# Patient Record
Sex: FEMALE | Race: WHITE | NOT HISPANIC OR LATINO | Employment: UNEMPLOYED | URBAN - NONMETROPOLITAN AREA
[De-identification: names, ages, dates, MRNs, and addresses within clinical notes are randomized per-mention and may not be internally consistent; named-entity substitution may affect disease eponyms.]

---

## 2018-02-23 ENCOUNTER — HISTORICAL (OUTPATIENT)
Dept: ADMINISTRATIVE | Facility: HOSPITAL | Age: 58
End: 2018-02-23

## 2018-03-29 LAB — CRC RECOMMENDATION EXT: NORMAL

## 2018-12-24 ENCOUNTER — HISTORICAL (OUTPATIENT)
Dept: ADMINISTRATIVE | Facility: HOSPITAL | Age: 58
End: 2018-12-24

## 2019-01-30 ENCOUNTER — HISTORICAL (OUTPATIENT)
Dept: ADMINISTRATIVE | Facility: HOSPITAL | Age: 59
End: 2019-01-30

## 2019-02-24 ENCOUNTER — HISTORICAL (OUTPATIENT)
Dept: ADMINISTRATIVE | Facility: HOSPITAL | Age: 59
End: 2019-02-24

## 2020-05-19 ENCOUNTER — HISTORICAL (OUTPATIENT)
Dept: ADMINISTRATIVE | Facility: HOSPITAL | Age: 60
End: 2020-05-19

## 2020-07-11 ENCOUNTER — HISTORICAL (OUTPATIENT)
Dept: ADMINISTRATIVE | Facility: HOSPITAL | Age: 60
End: 2020-07-11

## 2021-05-24 LAB
BILIRUB SERPL-MCNC: NEGATIVE MG/DL
BLOOD URINE, POC: NEGATIVE
CLARITY, POC UA: CLEAR
COLOR, POC UA: YELLOW
GLUCOSE UR QL STRIP: NORMAL
KETONES UR QL STRIP: NEGATIVE
LEUKOCYTE EST, POC UA: NORMAL
NITRITE, POC UA: NEGATIVE
PH, POC UA: 5.5
PROTEIN, POC: NEGATIVE
SPECIFIC GRAVITY, POC UA: 1.02
UROBILINOGEN, POC UA: NORMAL

## 2021-05-27 ENCOUNTER — HISTORICAL (OUTPATIENT)
Dept: ADMINISTRATIVE | Facility: HOSPITAL | Age: 61
End: 2021-05-27

## 2021-12-19 ENCOUNTER — HISTORICAL (OUTPATIENT)
Dept: ADMINISTRATIVE | Facility: HOSPITAL | Age: 61
End: 2021-12-19

## 2022-07-29 ENCOUNTER — HISTORICAL (OUTPATIENT)
Dept: ADMINISTRATIVE | Facility: HOSPITAL | Age: 62
End: 2022-07-29

## 2022-07-29 LAB — BCS RECOMMENDATION EXT: NORMAL

## 2022-09-17 ENCOUNTER — HISTORICAL (OUTPATIENT)
Dept: ADMINISTRATIVE | Facility: HOSPITAL | Age: 62
End: 2022-09-17

## 2022-10-08 ENCOUNTER — HISTORICAL (OUTPATIENT)
Dept: ADMINISTRATIVE | Facility: HOSPITAL | Age: 62
End: 2022-10-08

## 2022-12-01 ENCOUNTER — HISTORICAL (OUTPATIENT)
Dept: ADMINISTRATIVE | Facility: HOSPITAL | Age: 62
End: 2022-12-01

## 2022-12-15 ENCOUNTER — HISTORICAL (OUTPATIENT)
Dept: ADMINISTRATIVE | Facility: HOSPITAL | Age: 62
End: 2022-12-15

## 2023-01-04 ENCOUNTER — HISTORICAL (OUTPATIENT)
Dept: ADMINISTRATIVE | Facility: HOSPITAL | Age: 63
End: 2023-01-04
Payer: MEDICAID

## 2023-01-06 ENCOUNTER — HOSPITAL ENCOUNTER (EMERGENCY)
Facility: HOSPITAL | Age: 63
Discharge: HOME | End: 2023-01-06
Attending: STUDENT IN AN ORGANIZED HEALTH CARE EDUCATION/TRAINING PROGRAM
Payer: MEDICAID

## 2023-01-06 VITALS
SYSTOLIC BLOOD PRESSURE: 93 MMHG | HEART RATE: 61 BPM | WEIGHT: 180 LBS | DIASTOLIC BLOOD PRESSURE: 53 MMHG | RESPIRATION RATE: 16 BRPM | OXYGEN SATURATION: 96 % | TEMPERATURE: 98 F

## 2023-01-06 DIAGNOSIS — S52.502S CLOSED FRACTURE OF DISTAL END OF LEFT RADIUS, UNSPECIFIED FRACTURE MORPHOLOGY, SEQUELA: ICD-10-CM

## 2023-01-06 DIAGNOSIS — S52.92XS CLOSED FRACTURE OF LEFT RADIUS AND ULNA, SEQUELA: Primary | ICD-10-CM

## 2023-01-06 DIAGNOSIS — S52.202S CLOSED FRACTURE OF LEFT RADIUS AND ULNA, SEQUELA: Primary | ICD-10-CM

## 2023-01-06 PROCEDURE — 99284 EMERGENCY DEPT VISIT MOD MDM: CPT

## 2023-01-06 RX ORDER — HYDROCODONE BITARTRATE AND ACETAMINOPHEN 5; 325 MG/1; MG/1
1 TABLET ORAL EVERY 6 HOURS PRN
Qty: 12 TABLET | Refills: 0 | Status: SHIPPED | OUTPATIENT
Start: 2023-01-06 | End: 2023-01-23

## 2023-01-06 NOTE — ED PROVIDER NOTES
Encounter Date: 1/6/2023       History     Chief Complaint   Patient presents with    Arm Injury     Seen at [facility] after fall with left arm fracture, told to come here for ortho referral.     Adilia Gregg is a 63 y.o. female who presents to the ED requesting an ortho referral. Patient reports falling onto her left arm on Wednesday and then presenting to the ER in [place]. She was told she has a left wrist fracture that would likely need surgery. She was placed in a splint and is here today requesting a referral to orthopedics. She reports pain at this time controlled with norco, however she is going to run out of norco today. She denies numbness/tingling, new trauma or injury, new or worsening pain.     The history is provided by the patient. No  was used.   Review of patient's allergies indicates:  No Known Allergies  No past medical history on file.  No past surgical history on file.  No family history on file.     Review of Systems   Constitutional:  Negative for chills and fever.   HENT:  Negative for congestion and sore throat.    Eyes:  Negative for redness and itching.   Respiratory:  Negative for cough and shortness of breath.    Cardiovascular:  Negative for chest pain.   Gastrointestinal:  Negative for abdominal pain and nausea.   Genitourinary:  Negative for dysuria and frequency.   Musculoskeletal:  Positive for arthralgias. Negative for back pain and gait problem.   Skin:  Negative for rash and wound.   Neurological:  Negative for dizziness and weakness.   Hematological:  Does not bruise/bleed easily.   Psychiatric/Behavioral:  Negative for agitation and confusion.      Physical Exam     Initial Vitals   BP Pulse Resp Temp SpO2   01/06/23 1112 01/06/23 1111 01/06/23 1111 01/06/23 1111 01/06/23 1111   (S) 99/65 67 18 98.2 °F (36.8 °C) 100 %      MAP       --                Physical Exam    Nursing note and vitals reviewed.  Constitutional: She appears well-developed and  well-nourished. No distress.   HENT:   Head: Normocephalic and atraumatic.   Mouth/Throat: No oropharyngeal exudate.   Eyes: EOM are normal. No scleral icterus.   Neck: Neck supple.   Normal range of motion.  Cardiovascular:  Normal rate and regular rhythm.           No murmur heard.  Pulmonary/Chest: Breath sounds normal. No respiratory distress. She has no wheezes.   Abdominal: Abdomen is soft. Bowel sounds are normal. She exhibits no distension. There is no abdominal tenderness.   Musculoskeletal:         General: Normal range of motion.      Cervical back: Normal range of motion and neck supple.      Comments: Left arm in sling and splint. NVI. Anh capillary refill     Neurological: She is alert and oriented to person, place, and time. No cranial nerve deficit.   Skin: Skin is warm and dry. Capillary refill takes less than 2 seconds. No erythema.   Psychiatric: She has a normal mood and affect. Her behavior is normal. Judgment and thought content normal.       ED Course   Procedures  Labs Reviewed - No data to display       Imaging Results    None          Medications - No data to display        APC / Resident Notes:   I was not physically present during the history, exam or disposition of this patient. I was available at all times for consultation. (Zmora)                   Clinical Impression:   Final diagnoses:  [S52.502S] Closed fracture of distal end of left radius, unspecified fracture morphology, sequela  [S52.92XS, S52.202S] Closed fracture of left radius and ulna, sequela (Primary)        ED Disposition Condition    Discharge Stable          ED Prescriptions       Medication Sig Dispense Start Date End Date Auth. Provider    HYDROcodone-acetaminophen (NORCO) 5-325 mg per tablet Take 1 tablet by mouth every 6 (six) hours as needed for Pain. 12 tablet 1/6/2023 -- Saranya Velarde PA-C          Follow-up Information       Follow up With Specialties Details Why Contact Info    [facility]  Orthoped[facility] Orthopedics Schedule an appointment as soon as possible for a visit   [address] [facility] [address] [phone]    [facility] - Emergency Dept Emergency Medicine  If symptoms worsen [address] [address] [phone]             Saranya Velarde PA-C  01/06/23 1224       Merlin Emmanuel MD  01/06/23 1530

## 2023-01-19 ENCOUNTER — DOCUMENTATION ONLY (OUTPATIENT)
Dept: ADMINISTRATIVE | Facility: HOSPITAL | Age: 63
End: 2023-01-19
Payer: MEDICAID

## 2023-01-20 ENCOUNTER — DOCUMENTATION ONLY (OUTPATIENT)
Dept: ADMINISTRATIVE | Facility: HOSPITAL | Age: 63
End: 2023-01-20
Payer: MEDICAID

## 2023-01-23 ENCOUNTER — HOSPITAL ENCOUNTER (OUTPATIENT)
Dept: CARDIOLOGY | Facility: HOSPITAL | Age: 63
Discharge: HOME | End: 2023-01-23
Attending: ORTHOPAEDIC SURGERY
Payer: MEDICAID

## 2023-01-23 ENCOUNTER — OFFICE VISIT (OUTPATIENT)
Dept: ORTHOPEDICS | Facility: CLINIC | Age: 63
End: 2023-01-23
Payer: MEDICAID

## 2023-01-23 ENCOUNTER — ANESTHESIA EVENT (OUTPATIENT)
Dept: SURGERY | Facility: HOSPITAL | Age: 63
End: 2023-01-23
Payer: MEDICAID

## 2023-01-23 ENCOUNTER — HOSPITAL ENCOUNTER (OUTPATIENT)
Dept: RADIOLOGY | Facility: HOSPITAL | Age: 63
Discharge: HOME | End: 2023-01-23
Attending: STUDENT IN AN ORGANIZED HEALTH CARE EDUCATION/TRAINING PROGRAM
Payer: MEDICAID

## 2023-01-23 ENCOUNTER — HOSPITAL ENCOUNTER (OUTPATIENT)
Dept: RADIOLOGY | Facility: HOSPITAL | Age: 63
Discharge: HOME | End: 2023-01-23
Attending: ORTHOPAEDIC SURGERY
Payer: MEDICAID

## 2023-01-23 VITALS
WEIGHT: 178.38 LBS | OXYGEN SATURATION: 99 % | HEIGHT: 60 IN | HEART RATE: 61 BPM | SYSTOLIC BLOOD PRESSURE: 100 MMHG | DIASTOLIC BLOOD PRESSURE: 66 MMHG | BODY MASS INDEX: 35.02 KG/M2 | RESPIRATION RATE: 18 BRPM

## 2023-01-23 DIAGNOSIS — S52.92XS CLOSED FRACTURE OF LEFT RADIUS AND ULNA, SEQUELA: ICD-10-CM

## 2023-01-23 DIAGNOSIS — Z01.818 OTHER SPECIFIED PRE-OPERATIVE EXAMINATION: ICD-10-CM

## 2023-01-23 DIAGNOSIS — S52.202S CLOSED FRACTURE OF LEFT RADIUS AND ULNA, SEQUELA: ICD-10-CM

## 2023-01-23 DIAGNOSIS — M25.532 LEFT WRIST PAIN: ICD-10-CM

## 2023-01-23 DIAGNOSIS — M25.532 LEFT WRIST PAIN: Primary | ICD-10-CM

## 2023-01-23 PROBLEM — S52.202A CLOSED FRACTURE OF LEFT RADIUS AND ULNA: Status: ACTIVE | Noted: 2023-01-23

## 2023-01-23 PROBLEM — S52.92XA CLOSED FRACTURE OF LEFT RADIUS AND ULNA: Status: ACTIVE | Noted: 2023-01-23

## 2023-01-23 PROCEDURE — 3074F SYST BP LT 130 MM HG: CPT | Mod: CPTII,,, | Performed by: ORTHOPAEDIC SURGERY

## 2023-01-23 PROCEDURE — 93005 ELECTROCARDIOGRAM TRACING: CPT

## 2023-01-23 PROCEDURE — 3008F BODY MASS INDEX DOCD: CPT | Mod: CPTII,,, | Performed by: ORTHOPAEDIC SURGERY

## 2023-01-23 PROCEDURE — 1159F MED LIST DOCD IN RCRD: CPT | Mod: CPTII,,, | Performed by: ORTHOPAEDIC SURGERY

## 2023-01-23 PROCEDURE — 99204 OFFICE O/P NEW MOD 45 MIN: CPT | Mod: S$PBB,57,, | Performed by: ORTHOPAEDIC SURGERY

## 2023-01-23 PROCEDURE — 71046 X-RAY EXAM CHEST 2 VIEWS: CPT | Mod: TC

## 2023-01-23 PROCEDURE — 3008F PR BODY MASS INDEX (BMI) DOCUMENTED: ICD-10-PCS | Mod: CPTII,,, | Performed by: ORTHOPAEDIC SURGERY

## 2023-01-23 PROCEDURE — 99204 PR OFFICE/OUTPT VISIT, NEW, LEVL IV, 45-59 MIN: ICD-10-PCS | Mod: S$PBB,57,, | Performed by: ORTHOPAEDIC SURGERY

## 2023-01-23 PROCEDURE — 3078F PR MOST RECENT DIASTOLIC BLOOD PRESSURE < 80 MM HG: ICD-10-PCS | Mod: CPTII,,, | Performed by: ORTHOPAEDIC SURGERY

## 2023-01-23 PROCEDURE — 3074F PR MOST RECENT SYSTOLIC BLOOD PRESSURE < 130 MM HG: ICD-10-PCS | Mod: CPTII,,, | Performed by: ORTHOPAEDIC SURGERY

## 2023-01-23 PROCEDURE — 1159F PR MEDICATION LIST DOCUMENTED IN MEDICAL RECORD: ICD-10-PCS | Mod: CPTII,,, | Performed by: ORTHOPAEDIC SURGERY

## 2023-01-23 PROCEDURE — 99215 OFFICE O/P EST HI 40 MIN: CPT | Mod: PBBFAC,25

## 2023-01-23 PROCEDURE — 3078F DIAST BP <80 MM HG: CPT | Mod: CPTII,,, | Performed by: ORTHOPAEDIC SURGERY

## 2023-01-23 PROCEDURE — 73110 X-RAY EXAM OF WRIST: CPT | Mod: TC,LT

## 2023-01-23 RX ORDER — METOPROLOL TARTRATE 50 MG/1
50 TABLET ORAL 2 TIMES DAILY
COMMUNITY
Start: 2022-12-19

## 2023-01-23 RX ORDER — MUPIROCIN 20 MG/G
OINTMENT TOPICAL
Status: CANCELLED | OUTPATIENT
Start: 2023-01-23

## 2023-01-23 RX ORDER — LORATADINE 10 MG/1
10 TABLET ORAL
COMMUNITY
Start: 2022-12-19

## 2023-01-23 RX ORDER — BUSPIRONE HYDROCHLORIDE 10 MG/1
10 TABLET ORAL EVERY 8 HOURS PRN
COMMUNITY
Start: 2022-12-19 | End: 2023-09-01

## 2023-01-23 RX ORDER — CALCIUM CITRATE/VITAMIN D3 200MG-6.25
TABLET ORAL
COMMUNITY
Start: 2022-08-22

## 2023-01-23 RX ORDER — ALBUTEROL SULFATE 90 UG/1
AEROSOL, METERED RESPIRATORY (INHALATION)
COMMUNITY
Start: 2022-12-19

## 2023-01-23 RX ORDER — GABAPENTIN 300 MG/1
300 CAPSULE ORAL 2 TIMES DAILY
COMMUNITY
Start: 2022-12-19

## 2023-01-23 RX ORDER — METFORMIN HYDROCHLORIDE 1000 MG/1
1000 TABLET ORAL NIGHTLY
COMMUNITY
Start: 2022-12-19

## 2023-01-23 RX ORDER — SITAGLIPTIN 100 MG/1
TABLET, FILM COATED ORAL
COMMUNITY
Start: 2022-12-19

## 2023-01-23 RX ORDER — ISOSORBIDE MONONITRATE 30 MG/1
30 TABLET, EXTENDED RELEASE ORAL DAILY
COMMUNITY
Start: 2022-12-30

## 2023-01-23 RX ORDER — DICLOFENAC SODIUM 10 MG/G
1 GEL TOPICAL
COMMUNITY
Start: 2022-07-13

## 2023-01-23 RX ORDER — FENOFIBRATE 145 MG/1
145 TABLET, FILM COATED ORAL
COMMUNITY
Start: 2022-12-19

## 2023-01-23 RX ORDER — ROSUVASTATIN CALCIUM 10 MG/1
40 TABLET, COATED ORAL DAILY
COMMUNITY
Start: 2022-12-19

## 2023-01-23 RX ORDER — OLANZAPINE 20 MG/1
20 TABLET ORAL NIGHTLY
COMMUNITY
Start: 2022-10-10

## 2023-01-23 RX ORDER — CITALOPRAM 20 MG/1
20 TABLET, FILM COATED ORAL
COMMUNITY
Start: 2022-12-19

## 2023-01-23 RX ORDER — NITROGLYCERIN 0.4 MG/1
0.4 TABLET SUBLINGUAL
COMMUNITY
Start: 2022-07-13

## 2023-01-23 RX ORDER — HYDROCODONE BITARTRATE AND ACETAMINOPHEN 5; 325 MG/1; MG/1
1 TABLET ORAL EVERY 6 HOURS PRN
Qty: 10 TABLET | Refills: 0 | Status: SHIPPED | OUTPATIENT
Start: 2023-01-23 | End: 2023-01-26

## 2023-01-23 RX ORDER — BUDESONIDE AND FORMOTEROL FUMARATE DIHYDRATE 80; 4.5 UG/1; UG/1
1 AEROSOL RESPIRATORY (INHALATION) 2 TIMES DAILY
COMMUNITY
Start: 2022-12-19

## 2023-01-23 RX ORDER — DICLOFENAC SODIUM 50 MG/1
50 TABLET, DELAYED RELEASE ORAL 2 TIMES DAILY
COMMUNITY
Start: 2022-07-13

## 2023-01-23 RX ORDER — SPIRONOLACTONE 25 MG/1
25 TABLET ORAL EVERY MORNING
COMMUNITY
Start: 2022-12-19

## 2023-01-23 RX ORDER — LISINOPRIL AND HYDROCHLOROTHIAZIDE 12.5; 2 MG/1; MG/1
1 TABLET ORAL DAILY
COMMUNITY
Start: 2022-12-19

## 2023-01-23 RX ORDER — CANAGLIFLOZIN 100 MG/1
TABLET, FILM COATED ORAL
COMMUNITY
Start: 2022-12-19

## 2023-01-23 RX ORDER — FUROSEMIDE 20 MG/1
20 TABLET ORAL DAILY
COMMUNITY
Start: 2022-12-19

## 2023-01-23 RX ORDER — PIOGLITAZONEHYDROCHLORIDE 15 MG/1
15 TABLET ORAL EVERY MORNING
COMMUNITY
Start: 2022-12-19

## 2023-01-23 NOTE — ANESTHESIA PREPROCEDURE EVALUATION
"                                                                                                             01/23/2023  Adilia Gregg is a 63 y.o., female with PMHx of obesity, PAD, HTN, HLD, ARVIN, smoking, COPD, CKD, DM, h/o TIA, bipolar/anxiety/depression presents for ORIF Lt distal radius.    COVID STATUS: NOT VACCINATED  BETA-BLOCKER: METOPROLOL    SCAR NURSE PHONE INTERVIEW 1/24/23    PROBLEM LIST:  -  LEFT DISTAL RADIUS/ULNA FRACTURE 2/2 FALL 1/4/23  -  OBESITY CLASS I  -  Per CARDs: CP 2/2 CORONARY MICROVASCULAR DYSFUNCTION - NTG USE (LASTLY 4mos. AGO x 1 TAB)  -  PAD (NON-OBST.) w/NEUROGENIC CLAUDICATION  -  Hx 2009 TIA - on ASA 81mg  -  HTN   -  HLD  -  T2DM (ORALS X 4) - 1/23/23 A1c 6.1% /; AVG. FASTING GLUCOSE "145"; DENIES ANY HYPOGLYCEMIA      - DM PERIPHERAL NEUROPATHY  -  BIPOLAR DISORDER, ANXIETY/DEPRESSION  -  CKD - 1/23/23 GFR=47, CREAT=1.29  -  CHRONIC VENOUS INSUFFICIENCY  -  OA  -  ARVIN - NO Tx  -  SMOKER 47 PPY; COPD - QUICK RELIEF USE (LASTLY 2+mos. AGO)    AM Rx DOS: ALBUTEROL INHAL PRN, BUSPAR, CELEXA, GABAPENTIN, NORCO PRN, ISOSORBIDE, METOPROLOL, SYMBICORT (CARDs ORDERED RANEXA BUT NOT IMPROVED by INSURANCE)    ORDERS -   SURGEON: 1/23/23 CBC, CMP, EKG, CXR;  ANESTHESIA: 1/23/23 A1c; DM PROTOCOL  CARDS ([facility]) - DR. REYES 9/29/22 OV (INCL. 11/22/21 ANNA MARIE); 10/27/22 ECHO - EF 60-65%, RVSP 32mmHg; 6/27/22 EKG; 12/23/21 LHC, ABD. AORTOGRAM (IN MEDIA)  Pre-op Assessment    I have reviewed the Patient Summary Reports.     I have reviewed the Nursing Notes. I have reviewed the NPO Status.   I have reviewed the Medications.     Review of Systems  Anesthesia Hx:  No problems with previous Anesthesia  History of prior surgery of interest to airway management or planning: Denies Family Hx of Anesthesia complications.   Denies Personal Hx of Anesthesia complications.   Hematology/Oncology:  Hematology Normal   Oncology Normal     EENT/Dental:  EENT/Dental Normal  Denies Otitis Media "   Cardiovascular:   Hypertension    Pulmonary:   COPD    Renal/:   Chronic Renal Disease    Hepatic/GI:  Hepatic/GI Normal    Musculoskeletal:   Arthritis     Neurological:   Neuromuscular Disease,    Endocrine:   Diabetes    Dermatological:  Skin Normal    Psych:   Psychiatric History          Physical Exam  General: Alert      Lab Results   Component Value Date    WBC 13.1 (H) 01/23/2023    HGB 13.6 01/23/2023    HCT 41.4 01/23/2023    MCV 88.1 01/23/2023     01/23/2023       CMP  Sodium Level   Date Value Ref Range Status   01/23/2023 135 (L) 136 - 145 mmol/L Final     Potassium Level   Date Value Ref Range Status   01/23/2023 3.8 3.5 - 5.1 mmol/L Final     Carbon Dioxide   Date Value Ref Range Status   01/23/2023 26 23 - 31 mmol/L Final     Blood Urea Nitrogen   Date Value Ref Range Status   01/23/2023 19.4 9.8 - 20.1 mg/dL Final     Creatinine   Date Value Ref Range Status   01/23/2023 1.29 (H) 0.55 - 1.02 mg/dL Final     Calcium Level Total   Date Value Ref Range Status   01/23/2023 9.8 8.4 - 10.2 mg/dL Final     Albumin Level   Date Value Ref Range Status   01/23/2023 4.0 3.4 - 4.8 g/dL Final     Bilirubin Total   Date Value Ref Range Status   01/23/2023 0.2 <=1.5 mg/dL Final     Alkaline Phosphatase   Date Value Ref Range Status   01/23/2023 77 40 - 150 unit/L Final     Aspartate Aminotransferase   Date Value Ref Range Status   01/23/2023 14 5 - 34 unit/L Final     Alanine Aminotransferase   Date Value Ref Range Status   01/23/2023 16 0 - 55 unit/L Final     eGFR   Date Value Ref Range Status   01/23/2023 47 mls/min/1.73/m2 Final     Lab Results   Component Value Date    HGBA1C 6.1 01/23/2023 12/23/21 LHC, ABD. AORTOGRAM    11/22/21 ANNA MARIE    10/27/22 ECHO          Anesthesia Plan  Type of Anesthesia, risks & benefits discussed:    Anesthesia Type: MAC, Regional  Intra-op Monitoring Plan: Standard ASA Monitors  Post Op Pain Control Plan: multimodal analgesia, peripheral nerve block and IV/PO  Opioids PRN  Induction:  IV  Airway Plan: Direct  Informed Consent: Informed consent signed with the Patient and all parties understand the risks and agree with anesthesia plan.  All questions answered. Patient consented to blood products? No  ASA Score: 3  Day of Surgery Review of History & Physical: H&P Update referred to the surgeon/provider.    Ready For Surgery From Anesthesia Perspective.     .

## 2023-01-23 NOTE — PROGRESS NOTES
[facility] Orthopaedic Surgery Clinic Note    In brief, Adilia Gregg is a 63 y.o. female with a closed left distal radius fracture, she previously underwent closed reduction and splinting.  However since this time her fracture has moved and she will require surgery.  She denies previous injuries to this arm.  She states that she is right-hand dominant, and lives alone.  She denies numbness.    She states that she smokes a pack a day of cigarettes, she denies illicit drug use or heavy drinking.      PMH:   Past Medical History:   Diagnosis Date    Diabetes mellitus, type 2     Hypertension     Personal history of colonic polyps     Joe Barnes MD       PSH:   Past Surgical History:   Procedure Laterality Date    COLONOSCOPY      Joe Barnes MD       SH:   Social History     Socioeconomic History    Marital status: Single   Tobacco Use    Smoking status: Every Day     Packs/day: 1.00     Types: Cigarettes    Smokeless tobacco: Never   Substance and Sexual Activity    Alcohol use: Not Currently    Drug use: Not Currently       FH: History reviewed. No pertinent family history.    Allergies: Review of patient's allergies indicates:  No Known Allergies    ROS:  See HPI    Physical Exam:  Vitals:    01/23/23 1104   BP: 100/66   Pulse: 61   Resp: 18       General: NAD  Cardio: RRR by peripheral pulse  Pulm: Normal WOB on room air, symmetric chest rise      MSK:  Left upper extremity   2 small eschars over the dorsum of her hand with significant ecchymosis surrounding the wrist with a volar deformity  No open wounds volarly   Moderate edema   Tenderness to palpation over the wrist  Sensation intact to light touch over median/ulnar/radial nerve distributions   AIN/PIN/ulnar nerve motor intact grossly   Radial pulse palpable    Imaging:   X-rays of the left wrist demonstrate a significantly displaced, comminuted distal radius fracture with volar angulation and malrotation, also visualized as a distal ulna  fracture    Assessment:  63-year-old female with a mechanical fall from standing with subsequent left distal radius fracture, previously undergone closed reduction and splinting.  Her distal radius has subsequently displaced.      Risks, benefits, alternatives to open reduction internal fixation left distal radius were discussed with the patient today in clinic, she understands these and elects to proceed with surgery   We will coordinate preoperative testing with anesthesia, she has a history of asthma, COPD, diabetes  Inform the patient she should speak with her insurance company in order to arrange transportation to and from surgery since she lives in [place], by herself  Plan for outpatient surgery, open reduction internal fixation of left distal radius on 01/26/2023      Issa Barahona MD  [facility] Orthopaedic Surgery

## 2023-01-23 NOTE — H&P (VIEW-ONLY)
[facility] Orthopaedic Surgery Clinic Note    In brief, Adilia Gregg is a 63 y.o. female with a closed left distal radius fracture, she previously underwent closed reduction and splinting.  However since this time her fracture has moved and she will require surgery.  She denies previous injuries to this arm.  She states that she is right-hand dominant, and lives alone.  She denies numbness.    She states that she smokes a pack a day of cigarettes, she denies illicit drug use or heavy drinking.      PMH:   Past Medical History:   Diagnosis Date    Diabetes mellitus, type 2     Hypertension     Personal history of colonic polyps     Joe Barnes MD       PSH:   Past Surgical History:   Procedure Laterality Date    COLONOSCOPY      Joe Barnes MD       SH:   Social History     Socioeconomic History    Marital status: Single   Tobacco Use    Smoking status: Every Day     Packs/day: 1.00     Types: Cigarettes    Smokeless tobacco: Never   Substance and Sexual Activity    Alcohol use: Not Currently    Drug use: Not Currently       FH: History reviewed. No pertinent family history.    Allergies: Review of patient's allergies indicates:  No Known Allergies    ROS:  See HPI    Physical Exam:  Vitals:    01/23/23 1104   BP: 100/66   Pulse: 61   Resp: 18       General: NAD  Cardio: RRR by peripheral pulse  Pulm: Normal WOB on room air, symmetric chest rise      MSK:  Left upper extremity   2 small eschars over the dorsum of her hand with significant ecchymosis surrounding the wrist with a volar deformity  No open wounds volarly   Moderate edema   Tenderness to palpation over the wrist  Sensation intact to light touch over median/ulnar/radial nerve distributions   AIN/PIN/ulnar nerve motor intact grossly   Radial pulse palpable    Imaging:   X-rays of the left wrist demonstrate a significantly displaced, comminuted distal radius fracture with volar angulation and malrotation, also visualized as a distal ulna  fracture    Assessment:  63-year-old female with a mechanical fall from standing with subsequent left distal radius fracture, previously undergone closed reduction and splinting.  Her distal radius has subsequently displaced.      Risks, benefits, alternatives to open reduction internal fixation left distal radius were discussed with the patient today in clinic, she understands these and elects to proceed with surgery   We will coordinate preoperative testing with anesthesia, she has a history of asthma, COPD, diabetes  Inform the patient she should speak with her insurance company in order to arrange transportation to and from surgery since she lives in [place], by herself  Plan for outpatient surgery, open reduction internal fixation of left distal radius on 01/26/2023      Issa Barahona MD  \[facility]\"" Orthopaedic Surgery

## 2023-01-23 NOTE — PROGRESS NOTES
Faculty Attestation: Adilia Gregg  was seen at [facility] in the Orthopaedic Clinic. Patient seen and evaluated at the time of the visit. History of Present Illness, Physical Exam, and Assessment and Plan reviewed. Treatment plan is reasonable and appropriate. Compliance with treatment recommendations is important. No procedure was performed.     Patient with left distal radius fracture.  Plan for ORIF of distal radius.  Risks/benefits/alternatives discussed in detail.  All questions answered, no guarantees made.  Plan for surgery on Thursday.    Manny Mariscal MD  Orthopaedic Surgery

## 2023-01-24 RX ORDER — RANOLAZINE 500 MG/1
500 TABLET, EXTENDED RELEASE ORAL 2 TIMES DAILY
COMMUNITY

## 2023-01-26 ENCOUNTER — ANESTHESIA (OUTPATIENT)
Dept: SURGERY | Facility: HOSPITAL | Age: 63
End: 2023-01-26
Payer: MEDICAID

## 2023-01-26 ENCOUNTER — HOSPITAL ENCOUNTER (OUTPATIENT)
Facility: HOSPITAL | Age: 63
Discharge: HOME | End: 2023-01-26
Attending: ORTHOPAEDIC SURGERY | Admitting: ORTHOPAEDIC SURGERY
Payer: MEDICAID

## 2023-01-26 DIAGNOSIS — Z01.818 OTHER SPECIFIED PRE-OPERATIVE EXAMINATION: Primary | ICD-10-CM

## 2023-01-26 DIAGNOSIS — S52.202S CLOSED FRACTURE OF LEFT RADIUS AND ULNA, SEQUELA: ICD-10-CM

## 2023-01-26 DIAGNOSIS — S52.92XS CLOSED FRACTURE OF LEFT RADIUS AND ULNA, SEQUELA: ICD-10-CM

## 2023-01-26 DIAGNOSIS — M25.532 LEFT WRIST PAIN: ICD-10-CM

## 2023-01-26 LAB
GLUCOSE SERPL-MCNC: 118 MG/DL (ref 70–110)
POCT GLUCOSE: 105 MG/DL (ref 70–110)
POCT GLUCOSE: 118 MG/DL (ref 70–110)
POCT GLUCOSE: 127 MG/DL (ref 70–110)

## 2023-01-26 PROCEDURE — 25607 PR OPEN RX DISTAL RADIUS FX, EXTRA-ARTICULAR: ICD-10-PCS | Mod: LT,,, | Performed by: ORTHOPAEDIC SURGERY

## 2023-01-26 PROCEDURE — 01830 ANES ARTHR/NDSC WRST/HND NOS: CPT | Performed by: ORTHOPAEDIC SURGERY

## 2023-01-26 PROCEDURE — 63600175 PHARM REV CODE 636 W HCPCS

## 2023-01-26 PROCEDURE — C1769 GUIDE WIRE: HCPCS | Performed by: ORTHOPAEDIC SURGERY

## 2023-01-26 PROCEDURE — 37000009 HC ANESTHESIA EA ADD 15 MINS: Performed by: ORTHOPAEDIC SURGERY

## 2023-01-26 PROCEDURE — 71000016 HC POSTOP RECOV ADDL HR: Performed by: ORTHOPAEDIC SURGERY

## 2023-01-26 PROCEDURE — 63600175 PHARM REV CODE 636 W HCPCS: Performed by: ANESTHESIOLOGY

## 2023-01-26 PROCEDURE — 25000003 PHARM REV CODE 250: Performed by: SPECIALIST

## 2023-01-26 PROCEDURE — C1713 ANCHOR/SCREW BN/BN,TIS/BN: HCPCS | Performed by: ORTHOPAEDIC SURGERY

## 2023-01-26 PROCEDURE — 25000003 PHARM REV CODE 250: Performed by: NURSE ANESTHETIST, CERTIFIED REGISTERED

## 2023-01-26 PROCEDURE — 63600175 PHARM REV CODE 636 W HCPCS: Performed by: SPECIALIST

## 2023-01-26 PROCEDURE — 76942 ECHO GUIDE FOR BIOPSY: CPT | Performed by: ANESTHESIOLOGY

## 2023-01-26 PROCEDURE — 63600175 PHARM REV CODE 636 W HCPCS: Performed by: NURSE ANESTHETIST, CERTIFIED REGISTERED

## 2023-01-26 PROCEDURE — 36000710: Performed by: ORTHOPAEDIC SURGERY

## 2023-01-26 PROCEDURE — 25607 OPTX DST RD XARTC FX/EPI SEP: CPT | Mod: LT,,, | Performed by: ORTHOPAEDIC SURGERY

## 2023-01-26 PROCEDURE — 36000711: Performed by: ORTHOPAEDIC SURGERY

## 2023-01-26 PROCEDURE — 27201423 OPTIME MED/SURG SUP & DEVICES STERILE SUPPLY: Performed by: ORTHOPAEDIC SURGERY

## 2023-01-26 PROCEDURE — 37000008 HC ANESTHESIA 1ST 15 MINUTES: Performed by: ORTHOPAEDIC SURGERY

## 2023-01-26 PROCEDURE — 71000015 HC POSTOP RECOV 1ST HR: Performed by: ORTHOPAEDIC SURGERY

## 2023-01-26 PROCEDURE — 71000033 HC RECOVERY, INTIAL HOUR: Performed by: ORTHOPAEDIC SURGERY

## 2023-01-26 DEVICE — SCREW CORT LOK FT TI 3.5X12MM: Type: IMPLANTABLE DEVICE | Site: WRIST | Status: FUNCTIONAL

## 2023-01-26 DEVICE — PLATE GEMINUS NARROW 4H LEFT: Type: IMPLANTABLE DEVICE | Site: WRIST | Status: FUNCTIONAL

## 2023-01-26 DEVICE — PEG GEMINUS HI COMPR 2.7X18MM: Type: IMPLANTABLE DEVICE | Site: WRIST | Status: FUNCTIONAL

## 2023-01-26 DEVICE — SCREW GEMINUS NLOK 3.5X12MM: Type: IMPLANTABLE DEVICE | Site: WRIST | Status: FUNCTIONAL

## 2023-01-26 RX ORDER — METHOCARBAMOL 500 MG/1
500 TABLET, FILM COATED ORAL 4 TIMES DAILY
Qty: 20 TABLET | Refills: 0 | Status: SHIPPED | OUTPATIENT
Start: 2023-01-26 | End: 2023-02-05

## 2023-01-26 RX ORDER — MUPIROCIN 20 MG/G
OINTMENT TOPICAL
Status: DISCONTINUED | OUTPATIENT
Start: 2023-01-26 | End: 2023-01-26 | Stop reason: HOSPADM

## 2023-01-26 RX ORDER — CEFAZOLIN SODIUM 2 G/50ML
2 SOLUTION INTRAVENOUS
Status: DISCONTINUED | OUTPATIENT
Start: 2023-01-26 | End: 2023-01-26 | Stop reason: HOSPADM

## 2023-01-26 RX ORDER — ROPIVACAINE HYDROCHLORIDE 5 MG/ML
INJECTION, SOLUTION EPIDURAL; INFILTRATION; PERINEURAL
Status: COMPLETED
Start: 2023-01-26 | End: 2023-01-26

## 2023-01-26 RX ORDER — ONDANSETRON 2 MG/ML
INJECTION INTRAMUSCULAR; INTRAVENOUS
Status: DISCONTINUED | OUTPATIENT
Start: 2023-01-26 | End: 2023-01-26

## 2023-01-26 RX ORDER — HYDROMORPHONE HYDROCHLORIDE 1 MG/ML
0.5 INJECTION, SOLUTION INTRAMUSCULAR; INTRAVENOUS; SUBCUTANEOUS EVERY 5 MIN PRN
Status: DISCONTINUED | OUTPATIENT
Start: 2023-01-26 | End: 2023-01-26 | Stop reason: HOSPADM

## 2023-01-26 RX ORDER — HYDROMORPHONE HYDROCHLORIDE 1 MG/ML
0.2 INJECTION, SOLUTION INTRAMUSCULAR; INTRAVENOUS; SUBCUTANEOUS EVERY 5 MIN PRN
Status: DISCONTINUED | OUTPATIENT
Start: 2023-01-26 | End: 2023-01-26 | Stop reason: HOSPADM

## 2023-01-26 RX ORDER — HYDROMORPHONE HYDROCHLORIDE 1 MG/ML
INJECTION, SOLUTION INTRAMUSCULAR; INTRAVENOUS; SUBCUTANEOUS
Status: DISCONTINUED
Start: 2023-01-26 | End: 2023-01-26 | Stop reason: WASHOUT

## 2023-01-26 RX ORDER — MIDAZOLAM HYDROCHLORIDE 5 MG/ML
INJECTION INTRAMUSCULAR; INTRAVENOUS
Status: DISCONTINUED | OUTPATIENT
Start: 2023-01-26 | End: 2023-01-26

## 2023-01-26 RX ORDER — KETAMINE HCL IN 0.9 % NACL 50 MG/5 ML
SYRINGE (ML) INTRAVENOUS
Status: DISCONTINUED | OUTPATIENT
Start: 2023-01-26 | End: 2023-01-26

## 2023-01-26 RX ORDER — LIDOCAINE HYDROCHLORIDE 10 MG/ML
1 INJECTION, SOLUTION EPIDURAL; INFILTRATION; INTRACAUDAL; PERINEURAL ONCE
Status: ACTIVE | OUTPATIENT
Start: 2023-01-26

## 2023-01-26 RX ORDER — PROPOFOL 10 MG/ML
INJECTION, EMULSION INTRAVENOUS CONTINUOUS PRN
Status: DISCONTINUED | OUTPATIENT
Start: 2023-01-26 | End: 2023-01-26

## 2023-01-26 RX ORDER — OXYCODONE AND ACETAMINOPHEN 5; 325 MG/1; MG/1
1 TABLET ORAL EVERY 4 HOURS PRN
Qty: 28 TABLET | Refills: 0 | Status: SHIPPED | OUTPATIENT
Start: 2023-01-26 | End: 2023-02-08 | Stop reason: ALTCHOICE

## 2023-01-26 RX ORDER — MIDAZOLAM HYDROCHLORIDE 1 MG/ML
5 INJECTION INTRAMUSCULAR; INTRAVENOUS ONCE AS NEEDED
Status: DISCONTINUED | OUTPATIENT
Start: 2023-01-26 | End: 2023-01-26 | Stop reason: HOSPADM

## 2023-01-26 RX ORDER — MIDAZOLAM HYDROCHLORIDE 5 MG/ML
INJECTION INTRAMUSCULAR; INTRAVENOUS
Status: COMPLETED
Start: 2023-01-26 | End: 2023-01-26

## 2023-01-26 RX ORDER — HYDROMORPHONE HYDROCHLORIDE 1 MG/ML
INJECTION, SOLUTION INTRAMUSCULAR; INTRAVENOUS; SUBCUTANEOUS
Status: DISCONTINUED
Start: 2023-01-26 | End: 2023-01-26 | Stop reason: HOSPADM

## 2023-01-26 RX ORDER — INSULIN ASPART 100 [IU]/ML
2-9 INJECTION, SOLUTION INTRAVENOUS; SUBCUTANEOUS
Status: ACTIVE | OUTPATIENT
Start: 2023-01-26

## 2023-01-26 RX ORDER — OXYCODONE AND ACETAMINOPHEN 5; 325 MG/1; MG/1
2 TABLET ORAL ONCE
Status: COMPLETED | OUTPATIENT
Start: 2023-01-26 | End: 2023-01-26

## 2023-01-26 RX ORDER — LIDOCAINE HYDROCHLORIDE 20 MG/ML
INJECTION INTRAVENOUS
Status: DISCONTINUED | OUTPATIENT
Start: 2023-01-26 | End: 2023-01-26

## 2023-01-26 RX ORDER — DIPHENHYDRAMINE HYDROCHLORIDE 50 MG/ML
25 INJECTION INTRAMUSCULAR; INTRAVENOUS ONCE AS NEEDED
Status: DISCONTINUED | OUTPATIENT
Start: 2023-01-26 | End: 2023-01-26 | Stop reason: HOSPADM

## 2023-01-26 RX ORDER — PROCHLORPERAZINE EDISYLATE 5 MG/ML
5 INJECTION INTRAMUSCULAR; INTRAVENOUS ONCE AS NEEDED
Status: DISCONTINUED | OUTPATIENT
Start: 2023-01-26 | End: 2023-01-26 | Stop reason: HOSPADM

## 2023-01-26 RX ORDER — GLYCOPYRROLATE 0.2 MG/ML
INJECTION INTRAMUSCULAR; INTRAVENOUS
Status: DISCONTINUED | OUTPATIENT
Start: 2023-01-26 | End: 2023-01-26

## 2023-01-26 RX ORDER — ONDANSETRON 2 MG/ML
4 INJECTION INTRAMUSCULAR; INTRAVENOUS ONCE
Status: DISCONTINUED | OUTPATIENT
Start: 2023-01-26 | End: 2023-01-26 | Stop reason: HOSPADM

## 2023-01-26 RX ORDER — INSULIN ASPART 100 [IU]/ML
4-12 INJECTION, SOLUTION INTRAVENOUS; SUBCUTANEOUS
Status: ACTIVE | OUTPATIENT
Start: 2023-01-26

## 2023-01-26 RX ORDER — MEPERIDINE HYDROCHLORIDE 25 MG/ML
12.5 INJECTION INTRAMUSCULAR; INTRAVENOUS; SUBCUTANEOUS ONCE
Status: DISCONTINUED | OUTPATIENT
Start: 2023-01-26 | End: 2023-01-26 | Stop reason: HOSPADM

## 2023-01-26 RX ORDER — SODIUM CHLORIDE, SODIUM LACTATE, POTASSIUM CHLORIDE, CALCIUM CHLORIDE 600; 310; 30; 20 MG/100ML; MG/100ML; MG/100ML; MG/100ML
INJECTION, SOLUTION INTRAVENOUS CONTINUOUS
Status: DISCONTINUED | OUTPATIENT
Start: 2023-01-26 | End: 2023-01-26 | Stop reason: HOSPADM

## 2023-01-26 RX ORDER — FENTANYL CITRATE 50 UG/ML
INJECTION, SOLUTION INTRAMUSCULAR; INTRAVENOUS
Status: DISCONTINUED | OUTPATIENT
Start: 2023-01-26 | End: 2023-01-26

## 2023-01-26 RX ORDER — IPRATROPIUM BROMIDE AND ALBUTEROL SULFATE 2.5; .5 MG/3ML; MG/3ML
3 SOLUTION RESPIRATORY (INHALATION) ONCE AS NEEDED
Status: DISCONTINUED | OUTPATIENT
Start: 2023-01-26 | End: 2023-01-26 | Stop reason: HOSPADM

## 2023-01-26 RX ORDER — ROPIVACAINE HYDROCHLORIDE 5 MG/ML
INJECTION, SOLUTION EPIDURAL; INFILTRATION; PERINEURAL
Status: COMPLETED | OUTPATIENT
Start: 2023-01-26 | End: 2023-01-26

## 2023-01-26 RX ADMIN — PROPOFOL 200 MCG/KG/MIN: 10 INJECTION, EMULSION INTRAVENOUS at 07:01

## 2023-01-26 RX ADMIN — Medication 5 MG: at 07:01

## 2023-01-26 RX ADMIN — HYDROMORPHONE HYDROCHLORIDE 0.5 MG: 1 INJECTION, SOLUTION INTRAMUSCULAR; INTRAVENOUS; SUBCUTANEOUS at 08:01

## 2023-01-26 RX ADMIN — MIDAZOLAM 2.5 MG: 5 INJECTION INTRAMUSCULAR; INTRAVENOUS at 06:01

## 2023-01-26 RX ADMIN — FENTANYL CITRATE 50 MCG: 50 INJECTION, SOLUTION INTRAMUSCULAR; INTRAVENOUS at 07:01

## 2023-01-26 RX ADMIN — MIDAZOLAM HYDROCHLORIDE 5 MG: 5 INJECTION, SOLUTION INTRAMUSCULAR; INTRAVENOUS at 06:01

## 2023-01-26 RX ADMIN — ONDANSETRON 4 MG: 2 INJECTION INTRAMUSCULAR; INTRAVENOUS at 07:01

## 2023-01-26 RX ADMIN — LIDOCAINE HYDROCHLORIDE 50 MG: 20 INJECTION, SOLUTION INTRAVENOUS at 07:01

## 2023-01-26 RX ADMIN — MIDAZOLAM 2.5 MG: 5 INJECTION INTRAMUSCULAR; INTRAVENOUS at 07:01

## 2023-01-26 RX ADMIN — SODIUM CHLORIDE, POTASSIUM CHLORIDE, SODIUM LACTATE AND CALCIUM CHLORIDE: 600; 310; 30; 20 INJECTION, SOLUTION INTRAVENOUS at 06:01

## 2023-01-26 RX ADMIN — GLYCOPYRROLATE 0.2 MG: 0.2 INJECTION INTRAMUSCULAR; INTRAVENOUS at 07:01

## 2023-01-26 RX ADMIN — OXYCODONE HYDROCHLORIDE AND ACETAMINOPHEN 2 TABLET: 5; 325 TABLET ORAL at 09:01

## 2023-01-26 RX ADMIN — ROPIVACAINE HYDROCHLORIDE 30 ML: 5 INJECTION, SOLUTION EPIDURAL; INFILTRATION; PERINEURAL at 06:01

## 2023-01-26 NOTE — OP NOTE
OPERATIVE REPORT      Patient: Adilia Gregg   : 1960    MRN: [id number]  Date: 2023      Attending: Manny Mariscal MD  Resident: Dr. Vanessa Eugene DO  Preoperative Diagnosis:  Left distal radius fracture  Postoperative Diagnosis: Same  Procedure:  Left distal radius open reduction internal fixation  Anesthesiologist: No responsible provider has been recorded for the case.  OR Staff: Circulator: Louise Head RN; Patricia Reed RN  Scrub Person: [facility]  Resident: Vanessa Eugene DO  Implants:   Implant Name Type Inv. Item Serial No.  Lot No. LRB No. Used Action   PLATE GEMINUS NARROW 4H LEFT - [id number]  PLATE GEMINUS NARROW 4H LEFT  [employer]  Left 1 Implanted   SCREW GEMINUS NLOK 3.5X12MM - [id number]  SCREW GEMINUS NLOK 3.5X12MM  [employer]  Left 1 Implanted   screw, cortical locking, 3.5mm (Bronze) 11mm      Left 2 Implanted   SCREW CHAPARRO ZENY FT TI 3.5X12MM - [id number]  SCREW CHAPARRO ZENY FT TI 3.5X12MM  [employer]  Left 1 Implanted   Threaded Peg, Locking, 2.3mm (Pink) 14mm      Left 2 Implanted   Threaded Peg, Locking, 2.3mm (Pink) 16mm      Left 2 Implanted   Threaded Peg, Locking, 2.3mm (Pink) 19mm      Left 1 Implanted   PEG GEMINUS HI COMPR 2.7X18MM - [id number]  PEG GEMINUS HI COMPR 2.7X18MM  [employer]  Left 1 Implanted   Washer, Inside 2.7mm, Outside 5.0mm A.I.M.ing Guides, 1.5mm      Left 2 Implanted   K-Wire, Standard Tip, [id number]      Left 3 Implanted   Drill, Solid Side Cutting, [id number]      Left 1 Implanted   Drill, Solid Side Cutting, [id number]      Left 1 Implanted   , AO Connection, Square Tip 2.0mm      Left 2 Implanted   , [employer] Quick Connect, T10      Left 1 Implanted     EBL: See anesthesia note  Complications: None  Disposition: To PACU, stable  IMPLANTS: [employer] volar locking plate    Indications:   This is a 63-year-old female who sustained a closed left distal radius fracture.   We discussed the risks, benefits, alternative treatments and indications for open reduction internal fixation of this fracture.  We discussed the risk of loss of fixation, wrist stiffness, instability, neurovascular injury, chronic pain, loss of hand function, need for further surgery, posttraumatic arthritis, complex regional pain syndrome, loss of limb and life.  Specific risks discussed included, but were not limited to: superficial or deep infection, wound healing complications, DVT/PE, significant bleeding requiring transfusion, damage to named anatomic structures in the immediate area including named neurovascular structures, infection, nonunion, malunion and general risks of anesthesia.  The patient voiced understanding and written as well as verbal consent was obtained by myself prior to the procedure.    Procedure Note:    The patient was met in the preoperative holding area, we answered all their questions and confirm the correct operative site and procedure.  The patient was also met by the anesthesia team and they consented her for their portion of the procedure.  The patient was brought back to the OR and placed supine on the OR table. After successful induction of anesthesia by anesthesia staff, the patient was positioned in the supine position and all bony prominences were padded appropriately.  The surgical field was then provisionally cleansed and then prepped and draped in the usual sterile fashion.    At this time a time-out was performed, with the correct patient, site, and procedure identified.  The universal time out as well as sign your site protocols were followed.  Preoperative antibiotics were verified as administered.     Attention was turned to the left upper extremity.  A nonsterile tourniquet was raised to 200 mmHg.  A sharp incision was made through the skin in line with the traditional flexor carpi radialis volar approach to the distal radius.  Meticulous electrocautery hemostasis was  performed.  The FCR tendon sheath was incised, the FCR was retracted, exposing the pronator quadratus.  This was taken down bluntly.  The distal radius fracture was clearly visualized, it was transverse and extra-articular in nature.  There was a apex dorsal deformity, now with abundant interposed fibrous tissue and callus.  Some of this tissue was removed with a rongeur, curette.  The fracture was mobilized with the use of a [place], the brachioradialis was released.  Manual traction under fluoroscopic guidance was used to confirm appropriate reduction of this fracture, restoring both radial height and appropriate inclination and volar tilt.  A skeletal Dynamics distal radius volar locking plate was applied, K-wires were placed distally with fluoroscopic guidance to confirm appropriate plate placement proximal to the watershed line and extra-articular with their distal locking screws.  Next, locking screws were placed proximally in his 4 hole plate, securing it to the radial shaft.  Next, a threaded pegs locked were placed in the distal cluster, making sure that they did not violate the dorsal cortex and provided adequate subchondral support to this distal radius fracture.  Finally, a radial styloid screw was placed under fluoroscopic guidance.  Final x-rays confirmed appropriate reduction was maintained, appropriate screw lengths were maintained, and the fracture maintained its reduction.  The tourniquet was let down, hemostasis was achieved, the wound was copiously irrigated, closed with Vicryl sutures to the subdermal layer and Monocryl to the skin.  Sterile dressings were applied.  The patient was awakened and transferred to the PACU without having suffered any untoward event.  All counts were correct at the end of the case.        Postoperative plan:   -nonweightbearing for 6 weeks postop   -follow up in clinic in 2 weeks, repeat x-rays  -wound check at 2 weeks   -transitioned to a Velcro wrist brace and begin  hand and finger range of motion at that time    This note/OR report was created with the assistance of  voice recognition software or phone  dictation.  There may be transcription errors as a result of using this technology however minimal. Effort has been made to assure accuracy of transcription but any obvious errors or omissions should be clarified with the author of the document.     Dr. Vanessa Eugene  [facility] Orthopaedic Surgery  Pager: [phone]

## 2023-01-26 NOTE — DISCHARGE SUMMARY
Orthopedic Surgery Brief Op Discharge    Procedure:  Left distal radius open reduction internal fixation    Pre-op diagnosis:  Left distal radius fracture    Postop diagnosis: same    Surgeon:   Dr. Manny Eugene,     Fluids: see anesthesia documentation    Anesthesia: mac+regional    Complications: none      Patient presented to [facility] on day of scheduled surgery and left distal radius ORIF, please see operative report for further detail. Patient did well postoperatively and was found to be fit for discharge on POD# 0.  Their pain was controlled.  The patient met all discharge criteria.  The patient was discharged home in stable condition. Patient was given paper prescriptions.  They were given a follow-up appointment in 2 weeks in clinic for a wound check and range of motion check.  All questions and concerns of the patient were answered to their satisfaction.    Condition: Patient tolerated procedure well, was extubated, and returned to the recovery unit in stable condition.     Post Op Instructions    -maintain dressing clean and dry until followup  -nonweightbearing surgical extremity  -multimodal pain control  -return to clinic in 2 weeks for wound recheck and suture removal    Please call our team with questions or concerns    Dr. Vanessa Eugene  [facility] Orthopaedic Surgery  Pager: [phone]

## 2023-01-26 NOTE — DISCHARGE INSTRUCTIONS
Ortho    · Keep follow up appointment  at [facility].    · Take pain medication as prescribed.    · Keep current bandage and sling on until your follow up appointment with the ortho clinic.  The bandages must stay clean and dry.   Keep arm elevated on pillow.    · No driving or consuming alcohol for the next 24 hours or while taking narcotic pain medicine.    · May apply ice pack to surgical area for 20 minutes at a time 6-8 times per day.    ·    ·  Thanks for choosing [facility]! Have a smooth recovery!

## 2023-01-26 NOTE — BRIEF OP NOTE
Orthopedic Surgery Brief Op Discharge    Procedure:  Left distal radius open reduction internal fixation    Pre-op diagnosis:  Left distal radius fracture    Postop diagnosis: same    Surgeon:   Dr. Manny Eugene,     Fluids: see anesthesia documentation    Anesthesia: mac+regional    Complications: none      Patient presented to [facility] on day of scheduled surgery and left distal radius ORIF, please see operative report for further detail. Patient did well postoperatively and was found to be fit for discharge on POD# 0.  Their pain was controlled.  The patient met all discharge criteria.  The patient was discharged home in stable condition. Patient was given paper prescriptions.  They were given a follow-up appointment in 2 weeks in clinic for a wound check and range of motion check.  All questions and concerns of the patient were answered to their satisfaction.    Condition: Patient tolerated procedure well, was extubated, and returned to the recovery unit in stable condition.     Post Op Instructions    -maintain dressing clean and dry until followup  -nonweightbearing surgical extremity  -multimodal pain control  -return to clinic in 2 weeks for wound recheck and suture removal    Please call our team with questions or concerns    Dr. Vanessa Eugene  [facility] Orthopaedic Surgery  Pager: [phone]

## 2023-01-26 NOTE — ANESTHESIA POSTPROCEDURE EVALUATION
Anesthesia Post Evaluation    Patient: Adilia Gregg    Procedure(s) Performed: Procedure(s) (LRB):  ORIF, FRACTURE, RADIUS, DISTAL (Left)    Final Anesthesia Type: general      Patient location during evaluation: DOS[facility]  Post-procedure vital signs: reviewed and stable  Airway patency: patent      Anesthetic complications: no      Respiratory status: spontaneous ventilation  Follow-up not needed.          Vitals Value Taken Time   /72 01/26/23 1130   Temp 36.8 °C (98.2 °F) 01/26/23 1130   Pulse 60 01/26/23 1130   Resp 18 01/26/23 1130   SpO2 98 % 01/26/23 1130         Event Time   Out of Recovery 09:10:00         Pain/Jorge A Score: Pain Rating Prior to Med Admin: 6 (1/26/2023  9:48 AM)  Pain Rating Post Med Admin: 0 (1/26/2023  8:56 AM)  Jorge A Score: 10 (1/26/2023  9:15 AM)  Modified Jorge A Score: 19 (1/26/2023 11:30 AM)

## 2023-01-26 NOTE — TRANSFER OF CARE
Anesthesia Transfer of Care Note    Patient: Adilia Gregg    Procedure(s) Performed: Procedure(s) (LRB):  ORIF, FRACTURE, RADIUS, DISTAL (Left)    Patient location: PACU    Anesthesia Type: general    Transport from OR: Transported from OR on room air with adequate spontaneous ventilation    Post pain: pain needs to be addressed    Post assessment: no apparent anesthetic complications and tolerated procedure well    Post vital signs: stable    Level of consciousness: sedated and awake    Nausea/Vomiting: no nausea/vomiting    Complications: none    Transfer of care protocol was followed      Last vitals

## 2023-01-26 NOTE — H&P
[facility] Orthopaedic Surgery H&P Note     In brief, Adilia Gregg is a RHD 63 y.o. female with a closed left distal radius fracture, she previously underwent closed reduction and splinting.  However since this time her fracture has moved and she will require surgery.  She denies previous injuries to this arm.  She states that she is right-hand dominant, and lives alone.  She denies numbness.     She states that she smokes a pack a day of cigarettes, she denies illicit drug use or heavy drinking.     Hx of CKD, T2DM, HTN, COPD, CAD.     PMH:        Past Medical History:   Diagnosis Date    Diabetes mellitus, type 2      Hypertension      Personal history of colonic polyps       Joe Barnes MD         PSH:         Past Surgical History:   Procedure Laterality Date    COLONOSCOPY         Joe Barnes MD         SH:   Social History               Socioeconomic History    Marital status: Single   Tobacco Use    Smoking status: Every Day       Packs/day: 1.00       Types: Cigarettes    Smokeless tobacco: Never   Substance and Sexual Activity    Alcohol use: Not Currently    Drug use: Not Currently            FH: History reviewed. No pertinent family history.     Allergies: Review of patient's allergies indicates:  No Known Allergies     ROS:  See HPI     Physical Exam:      Vitals:     01/23/23 1104   BP: 100/66   Pulse: 61   Resp: 18         General: NAD  Cardio: RRR by peripheral pulse  Pulm: Normal WOB on room air, symmetric chest rise        MSK:  Left upper extremity   2 small eschars over the dorsum of her hand with significant ecchymosis surrounding the wrist with a volar deformity  No open wounds volarly   Moderate edema   Tenderness to palpation over the wrist  Sensation intact to light touch over /ulnar/radial nerve distributions, diffuse numbness over median nerve distribution   AIN/PIN/ulnar nerve motor intact grossly   Radial pulse palpable     Imaging:   X-rays of the left wrist demonstrate a significantly  displaced, comminuted distal radius fracture with volar angulation and malrotation, also visualized as a distal ulna fracture     Assessment:  63-year-old female with CKD, COPD, CAD, HTN. T2DM, smoking with a mechanical fall from standing with subsequent left distal radius fracture, previously undergone closed reduction and splinting.  Her distal radius has subsequently displaced.      Risks, benefits, alternatives to open reduction internal fixation left distal radius with L carpal tunnel release were discussed with the patient today in clinic, she understands these and elects to proceed with surgery   We will coordinate preoperative testing with anesthesia, she has a history of asthma, COPD, diabetes, we discussed that this is a recommended urgent surgery given the significant amount of displacement, some median nerve symptoms, and that she is at higher risk for some complications including infection due to her medical comorbidities  Inform the patient she should speak with her insurance company in order to arrange transportation to and from surgery since she lives in [place], by herself  Plan for outpatient surgery, open reduction internal fixation of left distal radius on 01/26/2023    Dr. Vanessa Eugene  [facility] Orthopaedic Surgery  Pager: [phone]

## 2023-01-26 NOTE — OR NURSING
"Meds to Bed requested by patient.  Called Tammie (with outpatient meds to bed) @ [phone] --- voicemail left re: needs for meds to bed for patient on 6N, contact number included.   10:20 - no response from Tammie.  10:25 - Called Giana (with outpatient meds  to bed) @ [phone]) voicemail left with request for meds to bed for patient on 6N.  Contact number left. 10:40 Neither person working with meds to bed responds to voicemails.  Prescription is taken to pharmacy by [facility] staff.  11:20 -- Outpatient pharmacy reports "about 20 minutes for meds".  11:45  -- Patient wheeled to outpatient pharmacy to retrieve medications, enroute to discharge  "

## 2023-01-26 NOTE — ANESTHESIA PROCEDURE NOTES
Peripheral Block    Patient location during procedure: pre-op   Block not for primary anesthetic.  Reason for block: at surgeon's request and post-op pain management   Post-op Pain Location: Lt arm pain   Start time: 1/26/2023 6:54 AM  Timeout: 1/26/2023 6:54 AM   End time: 1/26/2023 6:59 AM    Staffing  Authorizing Provider: Subha Gee MD  Performing Provider: Subha Gee MD    Preanesthetic Checklist  Completed: patient identified, IV checked, site marked, risks and benefits discussed, surgical consent, monitors and equipment checked, pre-op evaluation and timeout performed  Peripheral Block  Patient position: sitting  Prep: ChloraPrep  Patient monitoring: heart rate, cardiac monitor, continuous pulse ox, continuous capnometry and frequent blood pressure checks  Block type: infraclavicular  Laterality: left  Injection technique: single shot  Needle  Needle type: Stimuplex   Needle gauge: 21 G  Needle length: 4 in  Needle localization: ultrasound guidance and anatomical landmarks   -ultrasound image captured on disc.  Assessment  Injection assessment: negative aspiration and negative parasthesia  Paresthesia pain: none  Heart rate change: no  Slow fractionated injection: yes  Pain Tolerance: comfortable throughout block and no complaints  Medications:    Medications: ropivacaine (NAROPIN) injection 0.5% - Perineural   30 mL - 1/26/2023 6:55:00 AM    Additional Notes  VSS.  [facility] RN monitoring vitals throughout procedure.  Patient tolerated procedure well.

## 2023-01-26 NOTE — PROGRESS NOTES
Faculty Attestation: I was present and scrubbed throughout the key elements of the procedure.  I agree with the resident's findings and description.    Manny Mariscal  Orthopaedic Surgery

## 2023-01-27 VITALS
HEART RATE: 60 BPM | DIASTOLIC BLOOD PRESSURE: 72 MMHG | WEIGHT: 178.38 LBS | SYSTOLIC BLOOD PRESSURE: 116 MMHG | BODY MASS INDEX: 34.83 KG/M2 | RESPIRATION RATE: 18 BRPM | OXYGEN SATURATION: 98 % | TEMPERATURE: 98 F

## 2023-02-08 ENCOUNTER — OFFICE VISIT (OUTPATIENT)
Dept: ORTHOPEDICS | Facility: CLINIC | Age: 63
End: 2023-02-08
Payer: MEDICAID

## 2023-02-08 ENCOUNTER — HOSPITAL ENCOUNTER (OUTPATIENT)
Dept: RADIOLOGY | Facility: HOSPITAL | Age: 63
Discharge: HOME | End: 2023-02-08
Attending: STUDENT IN AN ORGANIZED HEALTH CARE EDUCATION/TRAINING PROGRAM
Payer: MEDICAID

## 2023-02-08 VITALS — BODY MASS INDEX: 33.96 KG/M2 | WEIGHT: 173 LBS | HEIGHT: 60 IN

## 2023-02-08 DIAGNOSIS — S52.202D CLOSED FRACTURE OF LEFT RADIUS AND ULNA WITH ROUTINE HEALING, SUBSEQUENT ENCOUNTER: ICD-10-CM

## 2023-02-08 DIAGNOSIS — S52.92XD CLOSED FRACTURE OF LEFT RADIUS AND ULNA WITH ROUTINE HEALING, SUBSEQUENT ENCOUNTER: ICD-10-CM

## 2023-02-08 DIAGNOSIS — S52.202D CLOSED FRACTURE OF LEFT RADIUS AND ULNA WITH ROUTINE HEALING, SUBSEQUENT ENCOUNTER: Primary | ICD-10-CM

## 2023-02-08 DIAGNOSIS — S52.92XD CLOSED FRACTURE OF LEFT RADIUS AND ULNA WITH ROUTINE HEALING, SUBSEQUENT ENCOUNTER: Primary | ICD-10-CM

## 2023-02-08 PROCEDURE — 99024 POSTOP FOLLOW-UP VISIT: CPT | Mod: ,,, | Performed by: ORTHOPAEDIC SURGERY

## 2023-02-08 PROCEDURE — 1159F MED LIST DOCD IN RCRD: CPT | Mod: CPTII,,, | Performed by: ORTHOPAEDIC SURGERY

## 2023-02-08 PROCEDURE — 1160F RVW MEDS BY RX/DR IN RCRD: CPT | Mod: CPTII,,, | Performed by: ORTHOPAEDIC SURGERY

## 2023-02-08 PROCEDURE — 99024 PR POST-OP FOLLOW-UP VISIT: ICD-10-PCS | Mod: ,,, | Performed by: ORTHOPAEDIC SURGERY

## 2023-02-08 PROCEDURE — 3008F PR BODY MASS INDEX (BMI) DOCUMENTED: ICD-10-PCS | Mod: CPTII,,, | Performed by: ORTHOPAEDIC SURGERY

## 2023-02-08 PROCEDURE — 1159F PR MEDICATION LIST DOCUMENTED IN MEDICAL RECORD: ICD-10-PCS | Mod: CPTII,,, | Performed by: ORTHOPAEDIC SURGERY

## 2023-02-08 PROCEDURE — 4010F ACE/ARB THERAPY RXD/TAKEN: CPT | Mod: CPTII,,, | Performed by: ORTHOPAEDIC SURGERY

## 2023-02-08 PROCEDURE — 73100 X-RAY EXAM OF WRIST: CPT | Mod: TC,LT

## 2023-02-08 PROCEDURE — 1160F PR REVIEW ALL MEDS BY PRESCRIBER/CLIN PHARMACIST DOCUMENTED: ICD-10-PCS | Mod: CPTII,,, | Performed by: ORTHOPAEDIC SURGERY

## 2023-02-08 PROCEDURE — 99214 OFFICE O/P EST MOD 30 MIN: CPT | Mod: PBBFAC

## 2023-02-08 PROCEDURE — 4010F PR ACE/ARB THEARPY RXD/TAKEN: ICD-10-PCS | Mod: CPTII,,, | Performed by: ORTHOPAEDIC SURGERY

## 2023-02-08 PROCEDURE — 3008F BODY MASS INDEX DOCD: CPT | Mod: CPTII,,, | Performed by: ORTHOPAEDIC SURGERY

## 2023-02-08 RX ORDER — HYDROCODONE BITARTRATE AND ACETAMINOPHEN 5; 325 MG/1; MG/1
1 TABLET ORAL EVERY 6 HOURS PRN
Qty: 7 TABLET | Refills: 0 | Status: SHIPPED | OUTPATIENT
Start: 2023-02-08 | End: 2023-03-24

## 2023-02-08 RX ORDER — ONDANSETRON 4 MG/1
4 TABLET, FILM COATED ORAL 2 TIMES DAILY
Qty: 30 TABLET | Refills: 0 | Status: SHIPPED | OUTPATIENT
Start: 2023-02-08 | End: 2023-02-23

## 2023-02-08 NOTE — PROGRESS NOTES
[facility] Orthopaedic Surgery Clinic Note    In brief, Adilia Gregg is a 63 y.o. female status post open reduction internal fixation of left distal radius malunion she returns today for her 2 week postoperative visit.  She is doing okay, she has some left wrist pain, especially because she was not able to tolerate her Percocet due to postoperative nausea.  She also is complaining of stiffness.  She is had no interval injuries, she denies fevers, chills, wound issues, she has been compliant with splint wear and maintaining nonweightbearing.    PMH:   Past Medical History:   Diagnosis Date    Anxiety and depression     Bipolar disorder, unspecified     Chest pain     per CARDs: CORONARY MICROVASCULAR DYSFUNCTION    Chronic venous insufficiency     CKD (chronic kidney disease)     COPD (chronic obstructive pulmonary disease)     Diabetes mellitus, type 2     Diabetic peripheral neuropathy associated with type 2 diabetes mellitus     HLD (hyperlipidemia)     Hypertension     ARVIN (obstructive sleep apnea)     Osteoarthritis     PAD (peripheral artery disease)     w/NEUROGENIC CLAUDICATION    Personal history of colonic polyps     Joe Barnes MD       PSH:   Past Surgical History:   Procedure Laterality Date    APPENDECTOMY      COLONOSCOPY  03/29/2018    HYSTERECTOMY  1983    OPEN REDUCTION AND INTERNAL FIXATION (ORIF) OF FRACTURE OF DISTAL RADIUS Left 1/26/2023    Procedure: ORIF, FRACTURE, RADIUS, DISTAL;  Surgeon: Manny Mariscal MD;  Location: [facility];  Service: Orthopedics;  Laterality: Left;  supine, regular or bed with hand table, tourniquet, c-arm       SH:   Social History     Socioeconomic History    Marital status: Single   Tobacco Use    Smoking status: Every Day     Packs/day: 1.00     Years: 47.00     Pack years: 47.00     Types: Cigarettes    Smokeless tobacco: Never   Substance and Sexual Activity    Alcohol use: Never    Drug use: Never     Types: Marijuana     Comment: 40 years ago       FH: History  reviewed. No pertinent family history.    Allergies:   Review of patient's allergies indicates:   Allergen Reactions    Pcn [penicillins] Hives    Wool Itching    Shrimp Hives and Itching       ROS:  See HPI    Physical Exam:  There were no vitals filed for this visit.      General: NAD  Cardio: RRR by peripheral pulse  Pulm: Normal WOB on room air, symmetric chest rise    MSK:  Left upper extremity   Surgical incision healing appropriately, no stigmata of infection  Moderate edema   Tenderness to palpation over the wrist  Sensation intact to light touch over median/ulnar/radial nerve distributions   AIN/PIN/ulnar nerve motor intact grossly   Significant stiffness with wrist extension and flexion, as well as finger extension and flexion, she is unable to make a full fist due to stiffness at this time  Radial pulse palpable    Imaging:   X-rays of the left wrist demonstrate a significantly displaced, comminuted distal radius fracture with volar angulation and malrotation, also visualized as a distal ulna fracture    Assessment:  63-year-old female with a mechanical fall from standing with subsequent left distal radius fracture, previously undergone closed reduction and splinting.  Her distal radius has subsequently displaced.      -remain nonweightbearing for an additional 4 weeks  -she is substantially stiff, we removed her splint in clinic today, provided order for Physical and Occupational therapy  -prescribed Norco because she could not tolerate oxycodone, prescribed Zofran to help with opioid induced nausea, and transitioned her to a Velcro wrist brace  -return to clinic in 4 weeks for new x-rays and re-evaluation    Issa Barahona MD

## 2023-02-08 NOTE — PROGRESS NOTES
Faculty Attestation: Adilia Gregg  was seen at [facility] in the Orthopaedic Clinic. Discussed with the resident at the time of the visit. History of Present Illness, Physical Exam, and Assessment and Plan reviewed. Treatment plan is reasonable and appropriate. Compliance with treatment recommendations is important. No procedure was performed.     Maurisio Chaves MD  Orthopaedic Surgery

## 2023-02-08 NOTE — LETTER
February 8, 2023      [facility]  [address] [place] [id number]  Phone: [phone]       Patient: Adilia Gregg   YOB: 1960  Date of Visit: 02/08/2023    To Whom It May Concern:    Bhavna Gregg  was at [facility] on 02/08/2023.  She was taken to this appointment by Lily Cosme. If you have any questions or concerns, or if I can be of further assistance, please do not hesitate to contact me.    Sincerely,    Homa Sweet LPN

## 2023-02-16 ENCOUNTER — APPOINTMENT (OUTPATIENT)
Dept: LAB | Facility: HOSPITAL | Age: 63
End: 2023-02-16
Attending: INTERNAL MEDICINE
Payer: MEDICAID

## 2023-02-16 DIAGNOSIS — E66.9 OBESITY, UNSPECIFIED CLASSIFICATION, UNSPECIFIED OBESITY TYPE, UNSPECIFIED WHETHER SERIOUS COMORBIDITY PRESENT: ICD-10-CM

## 2023-02-16 DIAGNOSIS — I10 ESSENTIAL HYPERTENSION, MALIGNANT: Primary | ICD-10-CM

## 2023-02-16 DIAGNOSIS — N18.9 CHRONIC KIDNEY DISEASE, UNSPECIFIED: ICD-10-CM

## 2023-02-16 DIAGNOSIS — J45.909 ASTHMATIC BRONCHITIS: ICD-10-CM

## 2023-02-16 LAB
APPEARANCE UR: CLEAR
BILIRUB UR QL STRIP.AUTO: NEGATIVE MG/DL
COLOR UR AUTO: YELLOW
CREAT UR-MCNC: 18.1 MG/DL
GLUCOSE UR QL STRIP.AUTO: 500 MG/DL
KETONES UR QL STRIP.AUTO: NEGATIVE MG/DL
LEUKOCYTE ESTERASE UR QL STRIP.AUTO: NEGATIVE UNIT/L
NITRITE UR QL STRIP.AUTO: NEGATIVE
PH UR STRIP.AUTO: 6 [PH]
PROT UR QL STRIP.AUTO: NEGATIVE MG/DL
PROT UR STRIP-MCNC: 10 MG/DL
RBC UR QL AUTO: NEGATIVE UNIT/L
SP GR UR STRIP.AUTO: 1.01
UROBILINOGEN UR STRIP-ACNC: 0.2 MG/DL

## 2023-02-16 PROCEDURE — 82570 ASSAY OF URINE CREATININE: CPT

## 2023-02-16 PROCEDURE — 84156 ASSAY OF PROTEIN URINE: CPT

## 2023-02-16 PROCEDURE — 81003 URINALYSIS AUTO W/O SCOPE: CPT

## 2023-03-24 ENCOUNTER — OFFICE VISIT (OUTPATIENT)
Dept: ORTHOPEDICS | Facility: CLINIC | Age: 63
End: 2023-03-24
Payer: MEDICAID

## 2023-03-24 ENCOUNTER — HOSPITAL ENCOUNTER (OUTPATIENT)
Dept: RADIOLOGY | Facility: HOSPITAL | Age: 63
Discharge: HOME | End: 2023-03-24
Attending: STUDENT IN AN ORGANIZED HEALTH CARE EDUCATION/TRAINING PROGRAM
Payer: MEDICAID

## 2023-03-24 DIAGNOSIS — M25.532 PAIN IN LEFT WRIST: Primary | ICD-10-CM

## 2023-03-24 DIAGNOSIS — M25.532 PAIN IN LEFT WRIST: ICD-10-CM

## 2023-03-24 PROCEDURE — 99024 PR POST-OP FOLLOW-UP VISIT: ICD-10-PCS | Mod: ,,, | Performed by: ORTHOPAEDIC SURGERY

## 2023-03-24 PROCEDURE — 99024 POSTOP FOLLOW-UP VISIT: CPT | Mod: ,,, | Performed by: ORTHOPAEDIC SURGERY

## 2023-03-24 PROCEDURE — 99212 OFFICE O/P EST SF 10 MIN: CPT | Mod: PBBFAC

## 2023-03-24 PROCEDURE — 4010F ACE/ARB THERAPY RXD/TAKEN: CPT | Mod: CPTII,,, | Performed by: ORTHOPAEDIC SURGERY

## 2023-03-24 PROCEDURE — 73110 X-RAY EXAM OF WRIST: CPT | Mod: TC,LT

## 2023-03-24 PROCEDURE — 4010F PR ACE/ARB THEARPY RXD/TAKEN: ICD-10-PCS | Mod: CPTII,,, | Performed by: ORTHOPAEDIC SURGERY

## 2023-03-24 RX ORDER — METHOCARBAMOL 500 MG/1
500 TABLET, FILM COATED ORAL 4 TIMES DAILY
Qty: 30 TABLET | Refills: 0 | Status: SHIPPED | OUTPATIENT
Start: 2023-03-24 | End: 2023-04-03

## 2023-03-24 NOTE — PROGRESS NOTES
[facility] Orthopaedic Surgery Clinic Note    In brief, Adilia Gregg is a 63 y.o. female status post open reduction internal fixation of left distal radius malunion she returns today for her 8 week postoperative visit date of surgery 01/26/2023.  She is doing very well.  She is pleased with how much progress she has made.  She no longer has pain.  She is enrolled in physical therapy.  She has been regaining her wrist range of motion.  She does not have any numbness or weakness.    PMH:   Past Medical History:   Diagnosis Date    Anxiety and depression     Bipolar disorder, unspecified     Chest pain     per CARDs: CORONARY MICROVASCULAR DYSFUNCTION    Chronic venous insufficiency     CKD (chronic kidney disease)     COPD (chronic obstructive pulmonary disease)     Diabetes mellitus, type 2     Diabetic peripheral neuropathy associated with type 2 diabetes mellitus     HLD (hyperlipidemia)     Hypertension     ARVIN (obstructive sleep apnea)     Osteoarthritis     PAD (peripheral artery disease)     w/NEUROGENIC CLAUDICATION    Personal history of colonic polyps     Joe Barnes MD       PSH:   Past Surgical History:   Procedure Laterality Date    APPENDECTOMY      COLONOSCOPY  03/29/2018    HYSTERECTOMY  1983    OPEN REDUCTION AND INTERNAL FIXATION (ORIF) OF FRACTURE OF DISTAL RADIUS Left 1/26/2023    Procedure: ORIF, FRACTURE, RADIUS, DISTAL;  Surgeon: Manny Mariscal MD;  Location: [facility];  Service: Orthopedics;  Laterality: Left;  supine, regular or bed with hand table, tourniquet, c-arm       SH:   Social History     Socioeconomic History    Marital status: Single   Tobacco Use    Smoking status: Every Day     Packs/day: 1.00     Years: 47.00     Pack years: 47.00     Types: Cigarettes    Smokeless tobacco: Never   Substance and Sexual Activity    Alcohol use: Never    Drug use: Never     Types: Marijuana     Comment: 40 years ago       FH: No family history on file.    Allergies:   Review of patient's allergies  indicates:   Allergen Reactions    Pcn [penicillins] Hives    Wool Itching    Shrimp Hives and Itching    Fluoxetine Hallucinations    Omeprazole Palpitations       ROS:  See HPI    Physical Exam:  There were no vitals filed for this visit.      General: NAD  Cardio: RRR by peripheral pulse  Pulm: Normal WOB on room air, symmetric chest rise    MSK:  Left upper extremity   Volar wrist incision fully healed no erythema drainage or fluctuance  Tenderness to palpation over the wrist  Sensation intact to light touch over median/ulnar/radial nerve distributions   AIN/PIN/ulnar nerve motor intact grossly   Wrist range of motion much improved   Wrist extension 30°   Wrist flexion 60 degree  Radial pulse palpable    Imaging:   Independent review of radiographs shows a well-aligned distal radius status post ORIF with notable callus formation    Assessment:  63-year-old female status post a left distal radius malunion osteotomy and fixation on 01/26/23    -patient is doing well now 2 months out from surgery, she is pleased with her outcome   -okay to weightbear as tolerated  -continue doing physical therapy she feels it is helping with her terminal wrist extension   -follow-up in 6 weeks for range-of-motion check and likely p.r.n. at that point    Vanessa Eugene, DO

## 2023-03-24 NOTE — PROGRESS NOTES
Faculty Attestation: Adilia Gregg  was seen at [facility] in the Orthopaedic Clinic. History of Present Illness, Physical Exam, and Assessment and Plan reviewed. Treatment plan is reasonable and appropriate. Compliance with treatment recommendations is important. Discussed with the resident at the time of the visit.  No procedure was performed.     Wolf Barron Jr, MD  Orthopaedic Surgery

## 2023-05-01 ENCOUNTER — OFFICE VISIT (OUTPATIENT)
Dept: ORTHOPEDICS | Facility: CLINIC | Age: 63
End: 2023-05-01
Payer: MEDICAID

## 2023-05-01 ENCOUNTER — HOSPITAL ENCOUNTER (OUTPATIENT)
Dept: RADIOLOGY | Facility: HOSPITAL | Age: 63
Discharge: HOME | End: 2023-05-01
Attending: STUDENT IN AN ORGANIZED HEALTH CARE EDUCATION/TRAINING PROGRAM
Payer: MEDICAID

## 2023-05-01 VITALS
HEIGHT: 60 IN | SYSTOLIC BLOOD PRESSURE: 117 MMHG | RESPIRATION RATE: 20 BRPM | OXYGEN SATURATION: 20 % | BODY MASS INDEX: 35.7 KG/M2 | DIASTOLIC BLOOD PRESSURE: 63 MMHG | HEART RATE: 72 BPM | WEIGHT: 181.81 LBS

## 2023-05-01 DIAGNOSIS — M25.532 LEFT WRIST PAIN: Primary | ICD-10-CM

## 2023-05-01 DIAGNOSIS — M25.532 LEFT WRIST PAIN: ICD-10-CM

## 2023-05-01 DIAGNOSIS — S52.202D CLOSED FRACTURE OF LEFT RADIUS AND ULNA WITH ROUTINE HEALING, SUBSEQUENT ENCOUNTER: ICD-10-CM

## 2023-05-01 DIAGNOSIS — S52.92XD CLOSED FRACTURE OF LEFT RADIUS AND ULNA WITH ROUTINE HEALING, SUBSEQUENT ENCOUNTER: ICD-10-CM

## 2023-05-01 PROCEDURE — 3078F PR MOST RECENT DIASTOLIC BLOOD PRESSURE < 80 MM HG: ICD-10-PCS | Mod: CPTII,,, | Performed by: SPECIALIST

## 2023-05-01 PROCEDURE — 1159F MED LIST DOCD IN RCRD: CPT | Mod: CPTII,,, | Performed by: SPECIALIST

## 2023-05-01 PROCEDURE — 3074F SYST BP LT 130 MM HG: CPT | Mod: CPTII,,, | Performed by: SPECIALIST

## 2023-05-01 PROCEDURE — 3008F PR BODY MASS INDEX (BMI) DOCUMENTED: ICD-10-PCS | Mod: CPTII,,, | Performed by: SPECIALIST

## 2023-05-01 PROCEDURE — 4010F ACE/ARB THERAPY RXD/TAKEN: CPT | Mod: CPTII,,, | Performed by: SPECIALIST

## 2023-05-01 PROCEDURE — 99215 OFFICE O/P EST HI 40 MIN: CPT | Mod: PBBFAC

## 2023-05-01 PROCEDURE — 3078F DIAST BP <80 MM HG: CPT | Mod: CPTII,,, | Performed by: SPECIALIST

## 2023-05-01 PROCEDURE — 3008F BODY MASS INDEX DOCD: CPT | Mod: CPTII,,, | Performed by: SPECIALIST

## 2023-05-01 PROCEDURE — 1159F PR MEDICATION LIST DOCUMENTED IN MEDICAL RECORD: ICD-10-PCS | Mod: CPTII,,, | Performed by: SPECIALIST

## 2023-05-01 PROCEDURE — 99499 UNLISTED E&M SERVICE: CPT | Mod: S$PBB,,, | Performed by: SPECIALIST

## 2023-05-01 PROCEDURE — 99499 NO LOS: ICD-10-PCS | Mod: S$PBB,,, | Performed by: SPECIALIST

## 2023-05-01 PROCEDURE — 4010F PR ACE/ARB THEARPY RXD/TAKEN: ICD-10-PCS | Mod: CPTII,,, | Performed by: SPECIALIST

## 2023-05-01 PROCEDURE — 3074F PR MOST RECENT SYSTOLIC BLOOD PRESSURE < 130 MM HG: ICD-10-PCS | Mod: CPTII,,, | Performed by: SPECIALIST

## 2023-05-01 PROCEDURE — 73110 X-RAY EXAM OF WRIST: CPT | Mod: TC,LT

## 2023-05-01 RX ORDER — METHOCARBAMOL 500 MG/1
500 TABLET, FILM COATED ORAL 4 TIMES DAILY
Qty: 40 TABLET | Refills: 0 | Status: SHIPPED | OUTPATIENT
Start: 2023-05-01 | End: 2023-05-11

## 2023-05-01 NOTE — PROGRESS NOTES
Faculty Attestation: Adilia Gregg  was seen at [facility] in the Orthopaedic Clinic. Discussed with the resident at the time of the visit. History of Present Illness, Physical Exam, and Assessment and Plan reviewed. Treatment plan is reasonable and appropriate. Compliance with treatment recommendations is important. Discussed with the resident at the time of the visit.  No procedure was performed.     Bucky Rojas MD MD  Orthopaedic Surgery

## 2023-05-01 NOTE — PROGRESS NOTES
[facility] Orthopaedic Surgery Clinic Note    In brief, Adilia Gregg is a 63 y.o. female status post open reduction internal fixation of left distal radius malunion 01/26/2023.      Returns today for a three-month postoperative visit.  Has been doing well.  Still has some soreness around the incision but this is much improved.  She is been able to use her wrist for activities of daily living.  She is pleased with her outcome.  Denies numbness or tingling.  No weakness.  No interval traumatic events.      PMH:   Past Medical History:   Diagnosis Date    Anxiety and depression     Bipolar disorder, unspecified     Chest pain     per CARDs: CORONARY MICROVASCULAR DYSFUNCTION    Chronic venous insufficiency     CKD (chronic kidney disease)     COPD (chronic obstructive pulmonary disease)     Diabetes mellitus, type 2     Diabetic peripheral neuropathy associated with type 2 diabetes mellitus     HLD (hyperlipidemia)     Hypertension     ARVIN (obstructive sleep apnea)     Osteoarthritis     PAD (peripheral artery disease)     w/NEUROGENIC CLAUDICATION    Personal history of colonic polyps     Joe Barnes MD       PSH:   Past Surgical History:   Procedure Laterality Date    APPENDECTOMY      COLONOSCOPY  03/29/2018    HYSTERECTOMY  1983    OPEN REDUCTION AND INTERNAL FIXATION (ORIF) OF FRACTURE OF DISTAL RADIUS Left 1/26/2023    Procedure: ORIF, FRACTURE, RADIUS, DISTAL;  Surgeon: Manny Mariscal MD;  Location: [facility];  Service: Orthopedics;  Laterality: Left;  supine, regular or bed with hand table, tourniquet, c-arm       SH:   Social History     Socioeconomic History    Marital status: Single   Tobacco Use    Smoking status: Every Day     Packs/day: 1.00     Years: 47.00     Pack years: 47.00     Types: Cigarettes    Smokeless tobacco: Never   Substance and Sexual Activity    Alcohol use: Never    Drug use: Never     Types: Marijuana     Comment: 40 years ago       FH: History reviewed. No pertinent family  history.    Allergies:   Review of patient's allergies indicates:   Allergen Reactions    Pcn [penicillins] Hives    Wool Itching    Shrimp Hives and Itching    Fluoxetine Hallucinations    Omeprazole Palpitations       ROS:  See HPI    Physical Exam:  Vitals:    05/01/23 0928   BP: 117/63   Pulse: 72   Resp: 20         General: NAD  Cardio: RRR by peripheral pulse  Pulm: Normal WOB on room air, symmetric chest rise    MSK:  Left upper extremity   Volar wrist incision fully healed no erythema drainage or fluctuance  Tenderness to palpation over the wrist  Sensation intact to light touch over median/ulnar/radial nerve distributions   AIN/PIN/ulnar nerve motor intact grossly   Wrist range of motion much improved   Wrist extension 60°  Wrist flexion 60 degree  Full pronation supination  Radial pulse palpable    Imaging:   Independent review of radiographs shows a well-aligned distal radius status post ORIF healing without complication    Assessment:  63-year-old female status post a left distal radius malunion osteotomy and fixation on 01/26/23    -now 3 months out from surgery, doing well   -weight-bearing as tolerated   -continue home exercises to help with terminal wrist flexion-extension   -follow-up as needed    Brennon Zamudio MD

## 2023-06-14 ENCOUNTER — LAB VISIT (OUTPATIENT)
Dept: LAB | Facility: HOSPITAL | Age: 63
End: 2023-06-14
Attending: INTERNAL MEDICINE
Payer: MEDICAID

## 2023-06-14 DIAGNOSIS — N18.9 CHRONIC KIDNEY DISEASE, UNSPECIFIED: ICD-10-CM

## 2023-06-14 DIAGNOSIS — J45.909 ASTHMATIC BRONCHITIS: ICD-10-CM

## 2023-06-14 DIAGNOSIS — E66.9 OBESITY, UNSPECIFIED CLASSIFICATION, UNSPECIFIED OBESITY TYPE, UNSPECIFIED WHETHER SERIOUS COMORBIDITY PRESENT: ICD-10-CM

## 2023-06-14 DIAGNOSIS — I10 ESSENTIAL HYPERTENSION, MALIGNANT: Primary | ICD-10-CM

## 2023-06-14 LAB
ABS NEUT CALC (OHS): 8.38 X10(3)/MCL (ref 2.1–9.2)
ALBUMIN SERPL-MCNC: 4.8 G/DL (ref 3.4–5)
APPEARANCE UR: CLEAR
BACTERIA #/AREA URNS AUTO: NORMAL /HPF
BILIRUB UR QL STRIP.AUTO: NEGATIVE MG/DL
BUN SERPL-MCNC: 15 MG/DL (ref 7–20)
CALCIUM SERPL-MCNC: 9.3 MG/DL (ref 8.4–10.2)
CHLORIDE SERPL-SCNC: 101 MMOL/L (ref 98–110)
CO2 SERPL-SCNC: 25 MMOL/L (ref 21–32)
COLOR UR: YELLOW
CREAT SERPL-MCNC: 1.06 MG/DL (ref 0.66–1.25)
CREAT UR-MCNC: 7.4 MG/DL
CREAT UR-MCNC: 7.9 MG/DL (ref 47–110)
CREAT/UREA NIT SERPL: 14 (ref 12–20)
EOSINOPHIL NFR BLD MANUAL: 0.13 X10(3)/MCL (ref 0–0.9)
EOSINOPHIL NFR BLD MANUAL: 1 % (ref 0–8)
ERYTHROCYTE [DISTWIDTH] IN BLOOD BY AUTOMATED COUNT: 13.4 % (ref 11–14.5)
GFR SERPLBLD CREATININE-BSD FMLA CKD-EPI: 59 MLS/MIN/1.73/M2
GLUCOSE SERPL-MCNC: 130 MG/DL (ref 70–115)
GLUCOSE UR QL STRIP.AUTO: 500 MG/DL
HCT VFR BLD AUTO: 39.9 % (ref 36–48)
HGB BLD-MCNC: 13.5 G/DL (ref 11.8–16)
IMM GRANULOCYTES # BLD AUTO: 0.54 X10(3)/MCL (ref 0–0.03)
IMM GRANULOCYTES NFR BLD AUTO: 4.3 % (ref 0–0.5)
KETONES UR QL STRIP.AUTO: NEGATIVE MG/DL
LEUKOCYTE ESTERASE UR QL STRIP.AUTO: NEGATIVE UNIT/L
LYMPHOCYTES NFR BLD MANUAL: 24 % (ref 13–40)
LYMPHOCYTES NFR BLD MANUAL: 3.05 X10(3)/MCL
MCH RBC QN AUTO: 29.9 PG (ref 27–34)
MCHC RBC AUTO-ENTMCNC: 33.8 G/DL (ref 31–37)
MCV RBC AUTO: 88.5 FL (ref 79–99)
MICROALBUMIN UR-MCNC: <5 UG/ML
MICROALBUMIN/CREAT RATIO PNL UR: <63.3 MG/GM CR (ref 0–30)
MONOCYTES NFR BLD MANUAL: 1.14 X10(3)/MCL (ref 0.1–1.3)
MONOCYTES NFR BLD MANUAL: 9 % (ref 2–11)
NEUTROPHILS NFR BLD MANUAL: 66 % (ref 47–80)
NITRITE UR QL STRIP.AUTO: NEGATIVE
NRBC BLD AUTO-RTO: 0 %
PH UR STRIP.AUTO: 5.5 [PH]
PHOSPHATE SERPL-MCNC: 3.8 MG/DL (ref 2.5–4.9)
PLATELET # BLD AUTO: 80 X10(3)/MCL (ref 140–371)
PMV BLD AUTO: 11.8 FL (ref 9.4–12.4)
POTASSIUM SERPL-SCNC: 5 MMOL/L (ref 3.5–5.1)
PROT UR QL STRIP.AUTO: NEGATIVE MG/DL
PROT UR STRIP-MCNC: 10 MG/DL
RBC # BLD AUTO: 4.51 X10(6)/MCL (ref 4–5.1)
RBC #/AREA URNS AUTO: NORMAL /HPF
RBC UR QL AUTO: ABNORMAL UNIT/L
SODIUM SERPL-SCNC: 133 MMOL/L (ref 135–145)
SP GR UR STRIP.AUTO: <=1.005
SQUAMOUS #/AREA URNS AUTO: NORMAL /HPF
UROBILINOGEN UR STRIP-ACNC: 0.2 MG/DL
WBC # SPEC AUTO: 12.7 X10(3)/MCL (ref 4–11.5)
WBC #/AREA URNS AUTO: NORMAL /HPF

## 2023-06-14 PROCEDURE — 82043 UR ALBUMIN QUANTITATIVE: CPT

## 2023-06-14 PROCEDURE — 84156 ASSAY OF PROTEIN URINE: CPT

## 2023-06-14 PROCEDURE — 81003 URINALYSIS AUTO W/O SCOPE: CPT

## 2023-06-14 PROCEDURE — 85027 COMPLETE CBC AUTOMATED: CPT

## 2023-06-14 PROCEDURE — 36415 COLL VENOUS BLD VENIPUNCTURE: CPT

## 2023-06-14 PROCEDURE — 82570 ASSAY OF URINE CREATININE: CPT

## 2023-06-14 PROCEDURE — 80069 RENAL FUNCTION PANEL: CPT

## 2023-07-31 ENCOUNTER — LAB VISIT (OUTPATIENT)
Dept: LAB | Facility: HOSPITAL | Age: 63
End: 2023-07-31
Attending: NURSE PRACTITIONER
Payer: MEDICAID

## 2023-07-31 DIAGNOSIS — Z79.899 ENCOUNTER FOR LONG-TERM (CURRENT) USE OF OTHER MEDICATIONS: Primary | ICD-10-CM

## 2023-07-31 LAB
DEPRECATED CALCIDIOL+CALCIFEROL SERPL-MC: 36.3 NG/ML (ref 30–100)
EST. AVERAGE GLUCOSE BLD GHB EST-MCNC: 128.4 MG/DL (ref 70–115)
HBA1C MFR BLD: 6.1 % (ref 4–6)

## 2023-07-31 PROCEDURE — 83036 HEMOGLOBIN GLYCOSYLATED A1C: CPT

## 2023-07-31 PROCEDURE — 82306 VITAMIN D 25 HYDROXY: CPT

## 2023-07-31 PROCEDURE — 36415 COLL VENOUS BLD VENIPUNCTURE: CPT

## 2023-08-17 ENCOUNTER — HOSPITAL ENCOUNTER (EMERGENCY)
Facility: HOSPITAL | Age: 63
Discharge: HOME | End: 2023-08-17
Payer: MEDICAID

## 2023-08-17 VITALS
BODY MASS INDEX: 35.93 KG/M2 | TEMPERATURE: 99 F | WEIGHT: 183 LBS | OXYGEN SATURATION: 99 % | SYSTOLIC BLOOD PRESSURE: 117 MMHG | RESPIRATION RATE: 18 BRPM | HEART RATE: 66 BPM | HEIGHT: 60 IN | DIASTOLIC BLOOD PRESSURE: 74 MMHG

## 2023-08-17 DIAGNOSIS — T14.90XA TRAUMA: ICD-10-CM

## 2023-08-17 DIAGNOSIS — M25.511 ACUTE PAIN OF RIGHT SHOULDER: Primary | ICD-10-CM

## 2023-08-17 PROCEDURE — 99283 EMERGENCY DEPT VISIT LOW MDM: CPT

## 2023-08-17 RX ORDER — CYCLOBENZAPRINE HCL 10 MG
10 TABLET ORAL 3 TIMES DAILY PRN
Qty: 15 TABLET | Refills: 0 | Status: SHIPPED | OUTPATIENT
Start: 2023-08-17 | End: 2023-08-22

## 2023-08-17 NOTE — ED PROVIDER NOTES
Encounter Date: 8/17/2023       History     Chief Complaint   Patient presents with    Shoulder Pain     Pt. R shoulder pain and discomfort, post throwing trash out. pt. Denies fall or trauma reports hx of rotator cuff tear in 1998     C/o  R shoulder pain and discomfort, post throwing trash out. pt. Denies fall or trauma reports hx of rotator cuff tear in 1998        Review of patient's allergies indicates:   Allergen Reactions    Pcn [penicillins] Hives    Wool Itching    Shrimp Hives and Itching    Fluoxetine Hallucinations    Omeprazole Palpitations     Past Medical History:   Diagnosis Date    Anxiety and depression     Bipolar disorder, unspecified     Chest pain     per CARDs: CORONARY MICROVASCULAR DYSFUNCTION    Chronic venous insufficiency     CKD (chronic kidney disease)     COPD (chronic obstructive pulmonary disease)     Diabetes mellitus, type 2     Diabetic peripheral neuropathy associated with type 2 diabetes mellitus     HLD (hyperlipidemia)     Hypertension     ARVIN (obstructive sleep apnea)     Osteoarthritis     PAD (peripheral artery disease)     w/NEUROGENIC CLAUDICATION    Personal history of colonic polyps     Joe Barnes MD     Past Surgical History:   Procedure Laterality Date    APPENDECTOMY      COLONOSCOPY  03/29/2018    HYSTERECTOMY  1983    OPEN REDUCTION AND INTERNAL FIXATION (ORIF) OF FRACTURE OF DISTAL RADIUS Left 1/26/2023    Procedure: ORIF, FRACTURE, RADIUS, DISTAL;  Surgeon: Manny Mariscal MD;  Location: [facility];  Service: Orthopedics;  Laterality: Left;  supine, regular or bed with hand table, tourniquet, c-arm     No family history on file.  Social History     Tobacco Use    Smoking status: Every Day     Current packs/day: 1.00     Average packs/day: 1 pack/day for 47.0 years (47.0 ttl pk-yrs)     Types: Cigarettes    Smokeless tobacco: Never   Substance Use Topics    Alcohol use: Never    Drug use: Never     Types: Marijuana     Comment: 40 years ago     Review of Systems    Musculoskeletal:         Rt shoulder pain   All other systems reviewed and are negative.      Physical Exam     Initial Vitals [08/17/23 1351]   BP Pulse Resp Temp SpO2   117/74 66 18 98.5 °F (36.9 °C) 99 %      MAP       --         Physical Exam    Nursing note and vitals reviewed.  Constitutional: She appears well-developed and well-nourished.   HENT:   Head: Normocephalic and atraumatic.   Eyes: Pupils are equal, round, and reactive to light.   Neck: Neck supple.   Normal range of motion.  Cardiovascular:  Normal rate.           Pulmonary/Chest: No respiratory distress.   Musculoskeletal:         General: Normal range of motion.      Cervical back: Normal range of motion and neck supple.     Neurological: She is alert and oriented to person, place, and time.   Skin: Skin is warm and dry. Capillary refill takes less than 2 seconds.   Psychiatric: She has a normal mood and affect. Her behavior is normal. Judgment and thought content normal.         ED Course   Procedures  Labs Reviewed - No data to display       Imaging Results              X-Ray Shoulder Complete 2 View Right (Final result)  Result time 08/17/23 14:38:42      Final result by Violetta Madrid III, MD (08/17/23 14:38:42)                   Impression:      1. Chronic changes are present as described above.  See above comments.      Electronically signed by: Violetta Madrid  Date:    08/17/2023  Time:    14:38               Narrative:    EXAMINATION:  STUDY: XR SHOULDER COMPLETE 2 OR MORE VIEWS RIGHT    CLINICAL HISTORY AND TECHNIQUE:  Tono Rodriguez, RT on 8/17/2023  2:23 PM    CLINICAL HX: ER PT    C/O RT SHOULDER PAIN THROWING OUT TRASH, DENIES ANY TRAUMA    PAST MEDICAL HX: ROTATOR CUFF TEAR IN 1998, DIABETES, HLD, HTN, OSTEOARTHRITIS, HYSTER    TECHNIQUE: 3V RT SHOULDER    TECH: NN/KE    COMPARISON:  None    FINDINGS:  A subcentimeter, benign-appearing bone cyst is noted within the lateral aspect of the right clavicle.  There is moderate  demineralization of the skeletal structures with mild to moderate degenerative changes noted involving the right glenohumeral joint and to a lesser extent the right acromioclavicular joint.  I see no definite fractures, dislocations, or other significant abnormalities.                                       Medications - No data to display                           Clinical Impression:   Final diagnoses:  [T14.90XA] Trauma  [M25.511] Acute pain of right shoulder (Primary)        ED Disposition Condition    Discharge Stable          ED Prescriptions       Medication Sig Dispense Start Date End Date Auth. Provider    cyclobenzaprine (FLEXERIL) 10 MG tablet Take 1 tablet (10 mg total) by mouth 3 (three) times daily as needed for Muscle spasms. 15 tablet 8/17/2023 8/22/2023 Reanna Godinez FNP          Follow-up Information       Follow up With Specialties Details Why Contact Info    pcp  Call  As needed              Reanna Godinez FNP  08/17/23 5404

## 2023-08-22 ENCOUNTER — HOSPITAL ENCOUNTER (OUTPATIENT)
Dept: RADIOLOGY | Facility: HOSPITAL | Age: 63
Discharge: HOME | End: 2023-08-22
Attending: NURSE PRACTITIONER
Payer: MEDICAID

## 2023-08-22 DIAGNOSIS — R10.13 ABDOMINAL PAIN, EPIGASTRIC: ICD-10-CM

## 2023-08-22 DIAGNOSIS — R10.11 ABDOMINAL PAIN, RIGHT UPPER QUADRANT: ICD-10-CM

## 2023-08-22 PROCEDURE — 76705 ECHO EXAM OF ABDOMEN: CPT | Mod: TC

## 2023-08-22 PROCEDURE — 78227 HEPATOBIL SYST IMAGE W/DRUG: CPT | Mod: TC

## 2023-09-01 ENCOUNTER — OFFICE VISIT (OUTPATIENT)
Dept: OBSTETRICS AND GYNECOLOGY | Facility: CLINIC | Age: 63
End: 2023-09-01
Payer: MEDICAID

## 2023-09-01 VITALS
HEIGHT: 60 IN | BODY MASS INDEX: 36.44 KG/M2 | SYSTOLIC BLOOD PRESSURE: 126 MMHG | DIASTOLIC BLOOD PRESSURE: 78 MMHG | WEIGHT: 185.63 LBS

## 2023-09-01 DIAGNOSIS — Z01.411 ABNORMAL GYNECOLOGICAL EXAMINATION: Primary | ICD-10-CM

## 2023-09-01 DIAGNOSIS — Z12.31 BREAST CANCER SCREENING BY MAMMOGRAM: ICD-10-CM

## 2023-09-01 DIAGNOSIS — N95.2 ATROPHIC VAGINITIS: ICD-10-CM

## 2023-09-01 PROCEDURE — 3008F PR BODY MASS INDEX (BMI) DOCUMENTED: ICD-10-PCS | Mod: CPTII,,, | Performed by: NURSE PRACTITIONER

## 2023-09-01 PROCEDURE — 99396 PR PREVENTIVE VISIT,EST,40-64: ICD-10-PCS | Mod: ,,, | Performed by: NURSE PRACTITIONER

## 2023-09-01 PROCEDURE — 99396 PREV VISIT EST AGE 40-64: CPT | Mod: ,,, | Performed by: NURSE PRACTITIONER

## 2023-09-01 PROCEDURE — 3008F BODY MASS INDEX DOCD: CPT | Mod: CPTII,,, | Performed by: NURSE PRACTITIONER

## 2023-09-01 PROCEDURE — 4010F PR ACE/ARB THEARPY RXD/TAKEN: ICD-10-PCS | Mod: CPTII,,, | Performed by: NURSE PRACTITIONER

## 2023-09-01 PROCEDURE — 3044F PR MOST RECENT HEMOGLOBIN A1C LEVEL <7.0%: ICD-10-PCS | Mod: CPTII,,, | Performed by: NURSE PRACTITIONER

## 2023-09-01 PROCEDURE — 1160F PR REVIEW ALL MEDS BY PRESCRIBER/CLIN PHARMACIST DOCUMENTED: ICD-10-PCS | Mod: CPTII,,, | Performed by: NURSE PRACTITIONER

## 2023-09-01 PROCEDURE — 3074F SYST BP LT 130 MM HG: CPT | Mod: CPTII,,, | Performed by: NURSE PRACTITIONER

## 2023-09-01 PROCEDURE — 3074F PR MOST RECENT SYSTOLIC BLOOD PRESSURE < 130 MM HG: ICD-10-PCS | Mod: CPTII,,, | Performed by: NURSE PRACTITIONER

## 2023-09-01 PROCEDURE — 3078F DIAST BP <80 MM HG: CPT | Mod: CPTII,,, | Performed by: NURSE PRACTITIONER

## 2023-09-01 PROCEDURE — 3044F HG A1C LEVEL LT 7.0%: CPT | Mod: CPTII,,, | Performed by: NURSE PRACTITIONER

## 2023-09-01 PROCEDURE — 1159F MED LIST DOCD IN RCRD: CPT | Mod: CPTII,,, | Performed by: NURSE PRACTITIONER

## 2023-09-01 PROCEDURE — 4010F ACE/ARB THERAPY RXD/TAKEN: CPT | Mod: CPTII,,, | Performed by: NURSE PRACTITIONER

## 2023-09-01 PROCEDURE — 1160F RVW MEDS BY RX/DR IN RCRD: CPT | Mod: CPTII,,, | Performed by: NURSE PRACTITIONER

## 2023-09-01 PROCEDURE — 3078F PR MOST RECENT DIASTOLIC BLOOD PRESSURE < 80 MM HG: ICD-10-PCS | Mod: CPTII,,, | Performed by: NURSE PRACTITIONER

## 2023-09-01 PROCEDURE — 1159F PR MEDICATION LIST DOCUMENTED IN MEDICAL RECORD: ICD-10-PCS | Mod: CPTII,,, | Performed by: NURSE PRACTITIONER

## 2023-09-01 RX ORDER — BUSPIRONE HYDROCHLORIDE 15 MG/1
7.5 TABLET ORAL 2 TIMES DAILY
COMMUNITY
Start: 2023-08-07

## 2023-09-01 RX ORDER — CHLORHEXIDINE GLUCONATE 4 %
1 LIQUID (ML) TOPICAL NIGHTLY
COMMUNITY
Start: 2023-07-10

## 2023-09-01 RX ORDER — LAMOTRIGINE 25 MG/1
50 TABLET ORAL DAILY
COMMUNITY
Start: 2023-08-28

## 2023-09-01 RX ORDER — IBUPROFEN 200 MG
TABLET ORAL
COMMUNITY
Start: 2022-07-13

## 2023-09-01 RX ORDER — SUCRALFATE 1 G/1
1 TABLET ORAL 3 TIMES DAILY
COMMUNITY
Start: 2023-08-08

## 2023-09-01 RX ORDER — PANTOPRAZOLE SODIUM 40 MG/1
40 TABLET, DELAYED RELEASE ORAL DAILY
COMMUNITY
Start: 2023-08-08

## 2023-09-01 NOTE — PROGRESS NOTES
Chief Complaint: Annual exam    Chief Complaint   Patient presents with    Annual Exam     MMG 2022, S/P Hyst hyst BSO        HPI:   63 y.o. F  presents for an annual gyn exam.  S/p MAYUR, BSO with no complaints today. Due for MMG.      FmHx: negative for breast, uterine, ovarian, and colon cancers.     Labs / Significant Studies:  MENOPAUSAL:  Age at menarche:9  Age at menopause: 53  Hysterectomy:  yes  Last pap: 2021, WNL   H/o abnl pap: yes  Postcoital Bleeding: No  Sexually Active: no   Dyspareunia: No  H/o STI: GC, CZ, Herpes,   HRT: none  MM2022  H/o abnl MMG: Yes, Benign Biopsy    Colonoscopy: 2018      Family History   Problem Relation Age of Onset    Breast cancer Maternal Grandmother     Breast cancer Mother     Ovarian cancer Neg Hx     Uterine cancer Neg Hx     Colon cancer Neg Hx     Cervical cancer Neg Hx          Past Medical History:   Diagnosis Date    Anxiety and depression     Bipolar disorder, unspecified     Chest pain     per CARDs: CORONARY MICROVASCULAR DYSFUNCTION    Chronic venous insufficiency     CKD (chronic kidney disease)     COPD (chronic obstructive pulmonary disease)     Diabetes mellitus, type 2     Diabetic peripheral neuropathy associated with type 2 diabetes mellitus     HLD (hyperlipidemia)     Hypertension     ARVIN (obstructive sleep apnea)     Osteoarthritis     PAD (peripheral artery disease)     w/NEUROGENIC CLAUDICATION    Personal history of colonic polyps     Joe Barnes MD     Past Surgical History:   Procedure Laterality Date    APPENDECTOMY      BILATERAL SALPINGO-OOPHORECTOMY (BSO)      BREAST BIOPSY      Benign    COLONOSCOPY  2018    EXCISION-ADENOMA  2018    OPEN REDUCTION AND INTERNAL FIXATION (ORIF) OF FRACTURE OF DISTAL RADIUS Left 2023    Procedure: ORIF, FRACTURE, RADIUS, DISTAL;  Surgeon: Manny Mariscal MD;  Location: [facility];  Service: Orthopedics;  Laterality: Left;  supine, regular or bed with hand  table, tourniquet, c-arm    TOTAL ABDOMINAL HYSTERECTOMY  1983       Current Outpatient Medications:     albuterol (PROVENTIL/VENTOLIN HFA) 90 mcg/actuation inhaler, SMARTSI Puff(s) Via Inhaler 4 Times Daily PRN, Disp: , Rfl:     ALLERGY RELIEF, LORATADINE, 10 mg tablet, Take 10 mg by mouth., Disp: , Rfl:     busPIRone (BUSPAR) 15 MG tablet, Take 7.5 mg by mouth 2 (two) times daily., Disp: , Rfl:     citalopram (CELEXA) 20 MG tablet, Take 20 mg by mouth., Disp: , Rfl:     diclofenac (VOLTAREN) 50 MG EC tablet, Take 50 mg by mouth., Disp: , Rfl:     diclofenac sodium (VOLTAREN) 1 % Gel, Apply 1 g topically., Disp: , Rfl:     fenofibrate (TRICOR) 145 MG tablet, Take 145 mg by mouth., Disp: , Rfl:     furosemide (LASIX) 20 MG tablet, Take 20 mg by mouth., Disp: , Rfl:     gabapentin (NEURONTIN) 300 MG capsule, Take 300 mg by mouth 2 (two) times daily., Disp: , Rfl:     INVOKANA 100 mg Tab tablet, TAKE 1 TABLET DAILY FOR DIABETES, Disp: , Rfl:     isosorbide mononitrate (IMDUR) 30 MG 24 hr tablet, Take 30 mg by mouth., Disp: , Rfl:     JANUVIA 100 mg Tab, TAKE 1 TABLET DAILY FOR DIABETES, Disp: , Rfl:     LAMICTAL 25 mg tablet, SMARTSI Tablet(s) By Mouth Every Evening, Disp: , Rfl:     lisinopriL-hydrochlorothiazide (PRINZIDE,ZESTORETIC) 20-12.5 mg per tablet, Take 1 tablet by mouth. FOR BLOOD PRESSURE, Disp: , Rfl:     melatonin 12 mg Tab, Take 1 tablet by mouth every evening., Disp: , Rfl:     metFORMIN (GLUCOPHAGE) 1000 MG tablet, Take 1,000 mg by mouth every evening., Disp: , Rfl:     metoprolol tartrate (LOPRESSOR) 50 MG tablet, Take 50 mg by mouth 2 (two) times daily., Disp: , Rfl:     nicotine (NICODERM CQ) 14 mg/24 hr, Apply topically., Disp: , Rfl:     nitroGLYCERIN (NITROSTAT) 0.4 MG SL tablet, Place 0.4 mg under the tongue., Disp: , Rfl:     OLANZapine (ZYPREXA) 20 MG tablet, Take 20 mg by mouth every evening., Disp: , Rfl:     pantoprazole (PROTONIX) 40 MG tablet, Take 40 mg by mouth., Disp: ,  Rfl:     pioglitazone (ACTOS) 15 MG tablet, Take 15 mg by mouth every morning., Disp: , Rfl:     ranolazine (RANEXA) 500 MG Tb12, Take 500 mg by mouth 2 (two) times daily., Disp: , Rfl:     rosuvastatin (CRESTOR) 10 MG tablet, Take 10 mg by mouth., Disp: , Rfl:     spironolactone (ALDACTONE) 25 MG tablet, Take 25 mg by mouth every morning., Disp: , Rfl:     sucralfate (CARAFATE) 1 gram tablet, Take 1 g by mouth 3 (three) times daily., Disp: , Rfl:     SYMBICORT 80-4.5 mcg/actuation HFAA, Inhale 1 puff into the lungs 2 (two) times daily., Disp: , Rfl:     TRUE METRIX GLUCOSE TEST STRIP Strp, SMARTSIG:Via Meter Daily, Disp: , Rfl:   No current facility-administered medications for this visit.    Facility-Administered Medications Ordered in Other Visits:     dextrose 10% bolus 125 mL 125 mL, 12.5 g, Intravenous, PRN, Osullivan-Dominguez Krugermy S, FNP    dextrose 10% bolus 125 mL 125 mL, 12.5 g, Intravenous, PRN, Osullivan-Saskia, Flor S, FNP    insulin aspart U-100 injection 2-9 Units, 2-9 Units, Subcutaneous, PRN, Osullivan-Saskia Flor S, FNP    insulin aspart U-100 injection 4-12 Units, 4-12 Units, Subcutaneous, PRN, Osullivan-Saskia Flor S, FNP    LIDOcaine (PF) 10 mg/ml (1%) injection 10 mg, 1 mL, Intradermal, Once, Abran-Flor Kruger S, FNP    Review of patient's allergies indicates:   Allergen Reactions    Pcn [penicillins] Hives    Wool Itching    Shrimp Hives and Itching    Fluoxetine Hallucinations    Omeprazole Palpitations       Social History     Tobacco Use    Smoking status: Every Day     Current packs/day: 1.00     Average packs/day: 1 pack/day for 47.0 years (47.0 ttl pk-yrs)     Types: Cigarettes    Smokeless tobacco: Never   Substance Use Topics    Alcohol use: Never    Drug use: Never     Types: Marijuana     Comment: 40 years ago       Review of Systems:  General/Constitutional: Chills denies. Fatigue/weakness denies. Fever denies. Night sweats denies. Hot flashes denies    Respiratory: Cough denies.  Hemoptysis denies. SOB denies. Sputum production denies. Wheezing denies .   Cardiovascular: Chest pain denies . Dizziness denies. Palpitations denies. Swelling in hands/feet denies    Gastrointestinal: Abdominal pain denies. Blood in stool denies. Constipation denies. Diarrhea denies. Heartburn denies. Nausea denies. Vomiting denies    Genitourinary: Incontinence denies. Blood in urine denies. Frequent urination denies. Painful urination denies. Urinary urgency denies. Nocturia denies    Gynecologic: Irregular menses denies. Heavy bleeding denies. Painful menses denies. Vaginal discharge denies. Vaginal odor denies. Vaginal itching denies. Vaginal lesion denies. Pelvic pain denies. Decreased libido denies. Vulvar lesion denies. Prolapse of genital organs denies. Painful intercourse denies. Postcoital bleeding denies    Psychiatric: Depression denies. Anxiety denies     Physical Exam:   Vitals:    09/01/23 1127   BP: 126/78   Weight: 84.2 kg (185 lb 9.6 oz)   Height: 5' (1.524 m)       Body mass index is 36.25 kg/m².       Chaperone: present.     General appearance: healthy, well-nourished and well-developed     Psychiatric: Orientation to time, place and person. Normal mood and affect and active, alert     Skin: Appearance: no rashes or lesions.     Neck:   Neck: supple, FROM, trachea midline. and no masses   Thyroid: no enlargement or nodules and non-tender.       Cardiovascular:   Auscultation: RRR and no murmur.   Peripheral Vascular: no varicosities, LLE edema, RLE edema, calf tenderness, and palpable cord and pedal pulses intact.     Lungs:   Respiratory effort: no intercostal retractions or accessory muscle usage.   Auscultation: no wheezing, rales/crackles, or rhonchi and clear to auscultation.     Breast:   Inspection/Palpation: no tenderness, discrete/distinct masses, skin changes, or abnormal secretions. Nipple appearance normal.     Abdomen:   Auscultation/Inspection/Palpation: no hepatomegaly,  splenomegaly, masses, tenderness or CVA tenderness and soft, non-distended bowel sounds preset.    Hernia: no palpable hernias.     Female Genitalia:    Vulva: no masses, tenderness or lesions    Bladder/Urethra: no urethral discharge or mass, normal meatus, bladder non-distended.    Vagina: no tenderness, erythema, cystocele, rectocele, abnormal vaginal discharge or vesicle(s) or ulcers, cuff intact    Lymph Nodes:   Palpation: non tender submandibular nodes, axillary nodes, or inguinal nodes.     Rectal Exam:   Rectum: normal perianal skin.       Assessment:     Patient Active Problem List   Diagnosis    Closed fracture of left radius and ulna    Acute pain of right shoulder       Health Maintenance Due   Topic Date Due    Hepatitis C Screening  Never done    Lipid Panel  Never done    COVID-19 Vaccine (1) Never done    Pneumococcal Vaccines (Age 0-64) (1 - PCV) Never done    HIV Screening  Never done    TETANUS VACCINE  Never done    LDCT Lung Screen  Never done    Shingles Vaccine (1 of 2) Never done    Colorectal Cancer Screening  03/29/2023    Mammogram  07/29/2023    Influenza Vaccine (1) Never done     Health Maintenance Topics with due status: Not Due       Topic Last Completion Date    Hemoglobin A1c (Prediabetes) 07/31/2023         Plan:    Adilia was seen today for annual exam.    Diagnoses and all orders for this visit:    Abnormal gynecological examination   Reviewed calcium needs, exercise, and prevention of osteoporosis.  Healthy diet  Annual MMG  Discussed breast self-awareness  Colonoscopy q 10 yrs  Reviewed normal menopause and menopausal symptoms  RTC 1 yr     Breast cancer screening by mammogram  -     Mammo Digital Screening Bilat w/ Yg; Future    Atrophic vaginitis

## 2023-09-08 ENCOUNTER — HOSPITAL ENCOUNTER (OUTPATIENT)
Dept: RADIOLOGY | Facility: HOSPITAL | Age: 63
Discharge: HOME | End: 2023-09-08
Attending: NURSE PRACTITIONER
Payer: MEDICAID

## 2023-09-08 VITALS — WEIGHT: 185 LBS | BODY MASS INDEX: 36.32 KG/M2 | HEIGHT: 60 IN

## 2023-09-08 DIAGNOSIS — Z12.31 BREAST CANCER SCREENING BY MAMMOGRAM: ICD-10-CM

## 2023-09-08 PROCEDURE — 77067 SCR MAMMO BI INCL CAD: CPT | Mod: TC

## 2023-09-21 DIAGNOSIS — G89.29 CHRONIC RIGHT SHOULDER PAIN: Primary | ICD-10-CM

## 2023-09-21 DIAGNOSIS — M25.511 CHRONIC RIGHT SHOULDER PAIN: Primary | ICD-10-CM

## 2023-10-20 ENCOUNTER — HOSPITAL ENCOUNTER (OUTPATIENT)
Facility: HOSPITAL | Age: 63
Discharge: HOME | End: 2023-10-22
Attending: FAMILY MEDICINE | Admitting: FAMILY MEDICINE
Payer: MEDICAID

## 2023-10-20 DIAGNOSIS — R26.81 UNSTEADINESS ON FEET: Primary | ICD-10-CM

## 2023-10-20 DIAGNOSIS — R06.00 DYSPNEA: ICD-10-CM

## 2023-10-20 DIAGNOSIS — N17.9 ACUTE RENAL FAILURE, UNSPECIFIED ACUTE RENAL FAILURE TYPE: ICD-10-CM

## 2023-10-20 DIAGNOSIS — E87.1 HYPONATREMIA: ICD-10-CM

## 2023-10-20 DIAGNOSIS — N39.0 URINARY TRACT INFECTION WITHOUT HEMATURIA, SITE UNSPECIFIED: ICD-10-CM

## 2023-10-20 PROBLEM — K59.00 CONSTIPATION: Status: ACTIVE | Noted: 2023-10-20

## 2023-10-20 PROBLEM — R07.9 CHEST PAIN: Status: ACTIVE | Noted: 2023-10-20

## 2023-10-20 PROBLEM — Z72.0 TOBACCO ABUSE: Status: ACTIVE | Noted: 2023-10-20

## 2023-10-20 LAB
ALBUMIN SERPL-MCNC: 4.8 G/DL (ref 3.4–5)
ALBUMIN/GLOB SERPL: 1.7 RATIO
ALP SERPL-CCNC: 69 UNIT/L (ref 50–144)
ALT SERPL-CCNC: 30 UNIT/L (ref 1–45)
AMPHET UR QL SCN: NEGATIVE
ANION GAP SERPL CALC-SCNC: 11 MEQ/L (ref 2–13)
ANION GAP SERPL CALC-SCNC: 7 MEQ/L (ref 2–13)
APPEARANCE UR: CLEAR
AST SERPL-CCNC: 31 UNIT/L (ref 14–36)
BACTERIA #/AREA URNS AUTO: ABNORMAL /HPF
BARBITURATE SCN PRESENT UR: NEGATIVE
BASOPHILS # BLD AUTO: 0.09 X10(3)/MCL (ref 0.01–0.08)
BASOPHILS NFR BLD AUTO: 0.8 % (ref 0.1–1.2)
BENZODIAZ UR QL SCN: NEGATIVE
BILIRUB SERPL-MCNC: 0.7 MG/DL (ref 0–1)
BILIRUB UR QL STRIP.AUTO: NEGATIVE
BNP BLD-MCNC: 49 PG/ML (ref 0–124.9)
BUN SERPL-MCNC: 22 MG/DL (ref 7–20)
BUN SERPL-MCNC: 25 MG/DL (ref 7–20)
CALCIUM SERPL-MCNC: 8.4 MG/DL (ref 8.4–10.2)
CALCIUM SERPL-MCNC: 9.5 MG/DL (ref 8.4–10.2)
CANNABINOIDS UR QL SCN: NEGATIVE
CHLORIDE SERPL-SCNC: 100 MMOL/L (ref 98–110)
CHLORIDE SERPL-SCNC: 90 MMOL/L (ref 98–110)
CK MB SERPL-MCNC: 2.21 NG/ML (ref 0–3.38)
CK SERPL-CCNC: 336 U/L (ref 30–135)
CO2 SERPL-SCNC: 23 MMOL/L (ref 21–32)
CO2 SERPL-SCNC: 24 MMOL/L (ref 21–32)
COCAINE UR QL SCN: NEGATIVE
COLOR UR AUTO: YELLOW
CREAT SERPL-MCNC: 1.16 MG/DL (ref 0.66–1.25)
CREAT SERPL-MCNC: 1.4 MG/DL (ref 0.66–1.25)
CREAT/UREA NIT SERPL: 18 (ref 12–20)
CREAT/UREA NIT SERPL: 19 (ref 12–20)
EOSINOPHIL # BLD AUTO: 0.13 X10(3)/MCL (ref 0.04–0.36)
EOSINOPHIL NFR BLD AUTO: 1.2 % (ref 0.7–7)
ERYTHROCYTE [DISTWIDTH] IN BLOOD BY AUTOMATED COUNT: 13.4 % (ref 11–14.5)
GFR SERPLBLD CREATININE-BSD FMLA CKD-EPI: 42 MLS/MIN/1.73/M2
GFR SERPLBLD CREATININE-BSD FMLA CKD-EPI: 53 MLS/MIN/1.73/M2
GLOBULIN SER-MCNC: 2.8 GM/DL (ref 2–3.9)
GLUCOSE SERPL-MCNC: 157 MG/DL (ref 70–115)
GLUCOSE SERPL-MCNC: 173 MG/DL (ref 70–115)
GLUCOSE UR QL STRIP.AUTO: >=1000
HCT VFR BLD AUTO: 39 % (ref 36–48)
HGB BLD-MCNC: 13.4 G/DL (ref 11.8–16)
IMM GRANULOCYTES # BLD AUTO: 0.19 X10(3)/MCL (ref 0–0.03)
IMM GRANULOCYTES NFR BLD AUTO: 1.8 % (ref 0–0.5)
INFLUENZA A (OHS): NEGATIVE
INFLUENZA B (OHS): NEGATIVE
KETONES UR QL STRIP.AUTO: NEGATIVE
LACTATE SERPL-SCNC: 1.3 MMOL/L (ref 0.4–2)
LEUKOCYTE ESTERASE UR QL STRIP.AUTO: ABNORMAL
LYMPHOCYTES # BLD AUTO: 1.97 X10(3)/MCL (ref 1.16–3.74)
LYMPHOCYTES NFR BLD AUTO: 18.3 % (ref 20–55)
MAGNESIUM SERPL-MCNC: 2.3 MG/DL (ref 1.8–2.4)
MCH RBC QN AUTO: 29.3 PG (ref 27–34)
MCHC RBC AUTO-ENTMCNC: 34.4 G/DL (ref 31–37)
MCV RBC AUTO: 85.3 FL (ref 79–99)
METHADONE UR QL SCN: NEGATIVE
MONOCYTES # BLD AUTO: 0.88 X10(3)/MCL (ref 0.24–0.36)
MONOCYTES NFR BLD AUTO: 8.2 % (ref 4.7–12.5)
NEUTROPHILS # BLD AUTO: 7.52 X10(3)/MCL (ref 1.56–6.13)
NEUTROPHILS NFR BLD AUTO: 69.7 % (ref 37–73)
NITRITE UR QL STRIP.AUTO: POSITIVE
NRBC BLD AUTO-RTO: 0 %
OPIATES UR QL SCN: NEGATIVE
PCP UR QL: NEGATIVE
PH UR STRIP.AUTO: 5.5 [PH]
PH UR: 5.5 [PH] (ref 3–11)
PLATELET # BLD AUTO: 101 X10(3)/MCL (ref 140–371)
PMV BLD AUTO: 11.5 FL (ref 9.4–12.4)
POCT GLUCOSE: 186 MG/DL (ref 70–110)
POTASSIUM SERPL-SCNC: 4.1 MMOL/L (ref 3.5–5.1)
POTASSIUM SERPL-SCNC: 4.8 MMOL/L (ref 3.5–5.1)
PROT SERPL-MCNC: 7.6 GM/DL (ref 6.3–8.2)
PROT UR QL STRIP.AUTO: NEGATIVE
RBC # BLD AUTO: 4.57 X10(6)/MCL (ref 4–5.1)
RBC #/AREA URNS AUTO: ABNORMAL /HPF
RBC UR QL AUTO: NEGATIVE
SARS-COV-2 RDRP RESP QL NAA+PROBE: NEGATIVE
SODIUM SERPL-SCNC: 125 MMOL/L (ref 135–145)
SODIUM SERPL-SCNC: 130 MMOL/L (ref 135–145)
SP GR UR STRIP.AUTO: 1.01 (ref 1–1.03)
SQUAMOUS #/AREA URNS AUTO: ABNORMAL /HPF
TROPONIN I SERPL-MCNC: <0.012 NG/ML (ref 0–0.03)
UROBILINOGEN UR STRIP-ACNC: 0.2
WBC # SPEC AUTO: 10.78 X10(3)/MCL (ref 4–11.5)
WBC #/AREA URNS AUTO: ABNORMAL /HPF

## 2023-10-20 PROCEDURE — G0378 HOSPITAL OBSERVATION PER HR: HCPCS

## 2023-10-20 PROCEDURE — 96361 HYDRATE IV INFUSION ADD-ON: CPT

## 2023-10-20 PROCEDURE — 87635 SARS-COV-2 COVID-19 AMP PRB: CPT | Performed by: FAMILY MEDICINE

## 2023-10-20 PROCEDURE — 83735 ASSAY OF MAGNESIUM: CPT | Performed by: FAMILY MEDICINE

## 2023-10-20 PROCEDURE — 93005 ELECTROCARDIOGRAM TRACING: CPT

## 2023-10-20 PROCEDURE — 99285 EMERGENCY DEPT VISIT HI MDM: CPT | Mod: 25

## 2023-10-20 PROCEDURE — 36415 COLL VENOUS BLD VENIPUNCTURE: CPT | Performed by: FAMILY MEDICINE

## 2023-10-20 PROCEDURE — 87040 BLOOD CULTURE FOR BACTERIA: CPT | Performed by: FAMILY MEDICINE

## 2023-10-20 PROCEDURE — 93010 ELECTROCARDIOGRAM REPORT: CPT | Mod: ,,, | Performed by: INTERNAL MEDICINE

## 2023-10-20 PROCEDURE — 85025 COMPLETE CBC W/AUTO DIFF WBC: CPT | Performed by: FAMILY MEDICINE

## 2023-10-20 PROCEDURE — 25000003 PHARM REV CODE 250: Performed by: HOSPITALIST

## 2023-10-20 PROCEDURE — 94640 AIRWAY INHALATION TREATMENT: CPT

## 2023-10-20 PROCEDURE — 80053 COMPREHEN METABOLIC PANEL: CPT | Performed by: FAMILY MEDICINE

## 2023-10-20 PROCEDURE — 25000242 PHARM REV CODE 250 ALT 637 W/ HCPCS: Performed by: FAMILY MEDICINE

## 2023-10-20 PROCEDURE — 63600175 PHARM REV CODE 636 W HCPCS: Performed by: FAMILY MEDICINE

## 2023-10-20 PROCEDURE — 87088 URINE BACTERIA CULTURE: CPT | Performed by: FAMILY MEDICINE

## 2023-10-20 PROCEDURE — 84484 ASSAY OF TROPONIN QUANT: CPT | Performed by: FAMILY MEDICINE

## 2023-10-20 PROCEDURE — 87400 INFLUENZA A/B EACH AG IA: CPT | Performed by: FAMILY MEDICINE

## 2023-10-20 PROCEDURE — 25000003 PHARM REV CODE 250: Performed by: FAMILY MEDICINE

## 2023-10-20 PROCEDURE — 94761 N-INVAS EAR/PLS OXIMETRY MLT: CPT

## 2023-10-20 PROCEDURE — 36415 COLL VENOUS BLD VENIPUNCTURE: CPT | Performed by: HOSPITALIST

## 2023-10-20 PROCEDURE — 93010 EKG 12-LEAD: ICD-10-PCS | Mod: ,,, | Performed by: INTERNAL MEDICINE

## 2023-10-20 PROCEDURE — 82550 ASSAY OF CK (CPK): CPT | Performed by: FAMILY MEDICINE

## 2023-10-20 PROCEDURE — 80307 DRUG TEST PRSMV CHEM ANLYZR: CPT | Performed by: FAMILY MEDICINE

## 2023-10-20 PROCEDURE — 83605 ASSAY OF LACTIC ACID: CPT | Performed by: FAMILY MEDICINE

## 2023-10-20 PROCEDURE — 96372 THER/PROPH/DIAG INJ SC/IM: CPT | Mod: 59 | Performed by: FAMILY MEDICINE

## 2023-10-20 PROCEDURE — 96365 THER/PROPH/DIAG IV INF INIT: CPT

## 2023-10-20 PROCEDURE — 25000242 PHARM REV CODE 250 ALT 637 W/ HCPCS: Performed by: HOSPITALIST

## 2023-10-20 PROCEDURE — 82553 CREATINE MB FRACTION: CPT | Performed by: FAMILY MEDICINE

## 2023-10-20 PROCEDURE — 25000003 PHARM REV CODE 250: Performed by: INTERNAL MEDICINE

## 2023-10-20 PROCEDURE — 81001 URINALYSIS AUTO W/SCOPE: CPT | Mod: 59 | Performed by: FAMILY MEDICINE

## 2023-10-20 PROCEDURE — 87186 SC STD MICRODIL/AGAR DIL: CPT | Performed by: FAMILY MEDICINE

## 2023-10-20 PROCEDURE — 99900035 HC TECH TIME PER 15 MIN (STAT)

## 2023-10-20 PROCEDURE — 83880 ASSAY OF NATRIURETIC PEPTIDE: CPT | Performed by: FAMILY MEDICINE

## 2023-10-20 RX ORDER — BUDESONIDE 0.25 MG/2ML
0.25 INHALANT ORAL EVERY 12 HOURS
Status: DISCONTINUED | OUTPATIENT
Start: 2023-10-20 | End: 2023-10-22 | Stop reason: HOSPADM

## 2023-10-20 RX ORDER — ONDANSETRON 2 MG/ML
4 INJECTION INTRAMUSCULAR; INTRAVENOUS EVERY 8 HOURS PRN
Status: DISCONTINUED | OUTPATIENT
Start: 2023-10-20 | End: 2023-10-22 | Stop reason: HOSPADM

## 2023-10-20 RX ORDER — ASPIRIN 325 MG
325 TABLET ORAL
Status: COMPLETED | OUTPATIENT
Start: 2023-10-20 | End: 2023-10-20

## 2023-10-20 RX ORDER — DOCUSATE SODIUM 100 MG/1
100 CAPSULE, LIQUID FILLED ORAL 2 TIMES DAILY PRN
COMMUNITY

## 2023-10-20 RX ORDER — NITROGLYCERIN 0.4 MG/1
0.4 TABLET SUBLINGUAL
Status: COMPLETED | OUTPATIENT
Start: 2023-10-20 | End: 2023-10-20

## 2023-10-20 RX ORDER — DULOXETIN HYDROCHLORIDE 20 MG/1
20 CAPSULE, DELAYED RELEASE ORAL DAILY
Status: DISCONTINUED | OUTPATIENT
Start: 2023-10-21 | End: 2023-10-22 | Stop reason: HOSPADM

## 2023-10-20 RX ORDER — SODIUM CHLORIDE 9 MG/ML
INJECTION, SOLUTION INTRAVENOUS CONTINUOUS
Status: DISCONTINUED | OUTPATIENT
Start: 2023-10-20 | End: 2023-10-21

## 2023-10-20 RX ORDER — POLYETHYLENE GLYCOL 3350 17 G/17G
17 POWDER, FOR SOLUTION ORAL DAILY
Status: DISCONTINUED | OUTPATIENT
Start: 2023-10-21 | End: 2023-10-22 | Stop reason: HOSPADM

## 2023-10-20 RX ORDER — ACETAMINOPHEN 325 MG/1
650 TABLET ORAL EVERY 8 HOURS PRN
Status: DISCONTINUED | OUTPATIENT
Start: 2023-10-20 | End: 2023-10-22 | Stop reason: HOSPADM

## 2023-10-20 RX ORDER — ENOXAPARIN SODIUM 100 MG/ML
40 INJECTION SUBCUTANEOUS EVERY 24 HOURS
Status: DISCONTINUED | OUTPATIENT
Start: 2023-10-20 | End: 2023-10-22 | Stop reason: HOSPADM

## 2023-10-20 RX ORDER — DICLOFENAC SODIUM 10 MG/G
2 GEL TOPICAL 3 TIMES DAILY
Status: DISCONTINUED | OUTPATIENT
Start: 2023-10-20 | End: 2023-10-22 | Stop reason: HOSPADM

## 2023-10-20 RX ORDER — GLUCAGON 1 MG
1 KIT INJECTION
Status: DISCONTINUED | OUTPATIENT
Start: 2023-10-20 | End: 2023-10-22 | Stop reason: HOSPADM

## 2023-10-20 RX ORDER — ASPIRIN 81 MG/1
81 TABLET ORAL DAILY
COMMUNITY

## 2023-10-20 RX ORDER — GABAPENTIN 300 MG/1
300 CAPSULE ORAL 2 TIMES DAILY
Status: DISCONTINUED | OUTPATIENT
Start: 2023-10-20 | End: 2023-10-22 | Stop reason: HOSPADM

## 2023-10-20 RX ORDER — IBUPROFEN 200 MG
16 TABLET ORAL
Status: DISCONTINUED | OUTPATIENT
Start: 2023-10-20 | End: 2023-10-22 | Stop reason: HOSPADM

## 2023-10-20 RX ORDER — BUSPIRONE HYDROCHLORIDE 7.5 MG/1
7.5 TABLET ORAL 2 TIMES DAILY
Status: DISCONTINUED | OUTPATIENT
Start: 2023-10-20 | End: 2023-10-22 | Stop reason: HOSPADM

## 2023-10-20 RX ORDER — CITALOPRAM 20 MG/1
20 TABLET, FILM COATED ORAL DAILY
Status: DISCONTINUED | OUTPATIENT
Start: 2023-10-20 | End: 2023-10-22 | Stop reason: HOSPADM

## 2023-10-20 RX ORDER — IBUPROFEN 200 MG
24 TABLET ORAL
Status: DISCONTINUED | OUTPATIENT
Start: 2023-10-20 | End: 2023-10-22 | Stop reason: HOSPADM

## 2023-10-20 RX ORDER — NITROGLYCERIN 0.4 MG/1
0.4 TABLET SUBLINGUAL EVERY 5 MIN PRN
Status: DISCONTINUED | OUTPATIENT
Start: 2023-10-20 | End: 2023-10-22 | Stop reason: HOSPADM

## 2023-10-20 RX ORDER — BISACODYL 10 MG
10 SUPPOSITORY, RECTAL RECTAL DAILY PRN
Status: DISCONTINUED | OUTPATIENT
Start: 2023-10-20 | End: 2023-10-22 | Stop reason: HOSPADM

## 2023-10-20 RX ORDER — DOCUSATE SODIUM 100 MG/1
100 CAPSULE, LIQUID FILLED ORAL 2 TIMES DAILY PRN
Status: DISCONTINUED | OUTPATIENT
Start: 2023-10-20 | End: 2023-10-22 | Stop reason: HOSPADM

## 2023-10-20 RX ORDER — BUDESONIDE AND FORMOTEROL FUMARATE DIHYDRATE 80; 4.5 UG/1; UG/1
1 AEROSOL RESPIRATORY (INHALATION) 2 TIMES DAILY
Status: DISCONTINUED | OUTPATIENT
Start: 2023-10-20 | End: 2023-10-20 | Stop reason: RX

## 2023-10-20 RX ORDER — ALBUTEROL SULFATE 0.83 MG/ML
2.5 SOLUTION RESPIRATORY (INHALATION) EVERY 4 HOURS PRN
Status: DISCONTINUED | OUTPATIENT
Start: 2023-10-20 | End: 2023-10-22 | Stop reason: HOSPADM

## 2023-10-20 RX ORDER — IBUPROFEN 600 MG/1
600 TABLET ORAL EVERY 6 HOURS PRN
Status: DISCONTINUED | OUTPATIENT
Start: 2023-10-20 | End: 2023-10-22 | Stop reason: HOSPADM

## 2023-10-20 RX ORDER — TALC
12 POWDER (GRAM) TOPICAL NIGHTLY
Status: DISCONTINUED | OUTPATIENT
Start: 2023-10-20 | End: 2023-10-22 | Stop reason: HOSPADM

## 2023-10-20 RX ORDER — DULOXETIN HYDROCHLORIDE 20 MG/1
30 CAPSULE, DELAYED RELEASE ORAL DAILY
COMMUNITY

## 2023-10-20 RX ORDER — INSULIN ASPART 100 [IU]/ML
0-5 INJECTION, SOLUTION INTRAVENOUS; SUBCUTANEOUS
Status: DISCONTINUED | OUTPATIENT
Start: 2023-10-20 | End: 2023-10-22 | Stop reason: HOSPADM

## 2023-10-20 RX ORDER — CHLORHEXIDINE GLUCONATE 4 %
1 LIQUID (ML) TOPICAL NIGHTLY
Status: DISCONTINUED | OUTPATIENT
Start: 2023-10-20 | End: 2023-10-20 | Stop reason: SDUPTHER

## 2023-10-20 RX ORDER — ALBUTEROL SULFATE 0.83 MG/ML
2.5 SOLUTION RESPIRATORY (INHALATION) 4 TIMES DAILY
Status: DISCONTINUED | OUTPATIENT
Start: 2023-10-20 | End: 2023-10-22 | Stop reason: HOSPADM

## 2023-10-20 RX ADMIN — Medication 12 MG: at 08:10

## 2023-10-20 RX ADMIN — CITALOPRAM HYDROBROMIDE 20 MG: 20 TABLET ORAL at 09:10

## 2023-10-20 RX ADMIN — SODIUM CHLORIDE 1000 ML: 9 INJECTION, SOLUTION INTRAVENOUS at 12:10

## 2023-10-20 RX ADMIN — ALBUTEROL SULFATE 2.5 MG: 2.5 SOLUTION RESPIRATORY (INHALATION) at 07:10

## 2023-10-20 RX ADMIN — DICLOFENAC SODIUM 2 G: 10 GEL TOPICAL at 08:10

## 2023-10-20 RX ADMIN — BUDESONIDE 0.25 MG: 0.25 INHALANT RESPIRATORY (INHALATION) at 07:10

## 2023-10-20 RX ADMIN — ENOXAPARIN SODIUM 40 MG: 40 INJECTION SUBCUTANEOUS at 04:10

## 2023-10-20 RX ADMIN — NITROGLYCERIN 0.4 MG: 0.4 TABLET SUBLINGUAL at 12:10

## 2023-10-20 RX ADMIN — CEFTRIAXONE SODIUM 1 G: 1 INJECTION, POWDER, FOR SOLUTION INTRAMUSCULAR; INTRAVENOUS at 12:10

## 2023-10-20 RX ADMIN — SODIUM CHLORIDE: 9 INJECTION, SOLUTION INTRAVENOUS at 03:10

## 2023-10-20 RX ADMIN — GABAPENTIN 300 MG: 300 CAPSULE ORAL at 08:10

## 2023-10-20 RX ADMIN — BUSPIRONE HYDROCHLORIDE 7.5 MG: 7.5 TABLET ORAL at 09:10

## 2023-10-20 RX ADMIN — ASPIRIN 325 MG ORAL TABLET 325 MG: 325 PILL ORAL at 12:10

## 2023-10-20 RX ADMIN — SODIUM CHLORIDE: 9 INJECTION, SOLUTION INTRAVENOUS at 11:10

## 2023-10-20 NOTE — ASSESSMENT & PLAN NOTE
Patient has hyponatremia which is uncontrolled,We will aim to correct the sodium by 4-6mEq in 24 hours. We will monitor sodium every 6 hours. The hyponatremia is due to Dehydration/hypovolemia. We will obtain the following studies: Urine sodium, urine osmolality, serum osmolality. We will treat the hyponatremia with IV fluids. The patient's sodium results have been reviewed and are listed below.  Recent Labs   Lab 10/20/23  1059   *   Also check TSH in am     Hold buspar, and SSRI

## 2023-10-20 NOTE — SUBJECTIVE & OBJECTIVE
Past Medical History:   Diagnosis Date    Anxiety and depression     Bipolar disorder, unspecified     Chest pain     per CARDs: CORONARY MICROVASCULAR DYSFUNCTION    Chronic venous insufficiency     CKD (chronic kidney disease)     COPD (chronic obstructive pulmonary disease)     Diabetes mellitus, type 2     Diabetic peripheral neuropathy associated with type 2 diabetes mellitus     HLD (hyperlipidemia)     Hypertension     ARVIN (obstructive sleep apnea)     Osteoarthritis     PAD (peripheral artery disease)     w/NEUROGENIC CLAUDICATION    Personal history of colonic polyps     Joe Barnes MD       Past Surgical History:   Procedure Laterality Date    APPENDECTOMY      BILATERAL SALPINGO-OOPHORECTOMY (BSO)      BREAST BIOPSY Right 2000    Benign    COLONOSCOPY  2018    EXCISION-ADENOMA  2018    OPEN REDUCTION AND INTERNAL FIXATION (ORIF) OF FRACTURE OF DISTAL RADIUS Left 2023    Procedure: ORIF, FRACTURE, RADIUS, DISTAL;  Surgeon: Manny Mariscal MD;  Location: [facility];  Service: Orthopedics;  Laterality: Left;  supine, regular or bed with hand table, tourniquet, c-arm    TOTAL ABDOMINAL HYSTERECTOMY  1983       Review of patient's allergies indicates:   Allergen Reactions    Pcn [penicillins] Hives    Wool Itching    Shrimp Hives and Itching    Fluoxetine Hallucinations    Omeprazole Palpitations       Current Facility-Administered Medications on File Prior to Encounter   Medication    dextrose 10% bolus 125 mL 125 mL    dextrose 10% bolus 125 mL 125 mL    insulin aspart U-100 injection 2-9 Units    insulin aspart U-100 injection 4-12 Units    LIDOcaine (PF) 10 mg/ml (1%) injection 10 mg     Current Outpatient Medications on File Prior to Encounter   Medication Sig    albuterol (PROVENTIL/VENTOLIN HFA) 90 mcg/actuation inhaler SMARTSI Puff(s) Via Inhaler 4 Times Daily PRN    ALLERGY RELIEF, LORATADINE, 10 mg tablet Take 10 mg by mouth.    aspirin (ECOTRIN) 81 MG EC tablet Take 81  mg by mouth once daily.    busPIRone (BUSPAR) 15 MG tablet Take 7.5 mg by mouth 2 (two) times daily.    diclofenac (VOLTAREN) 50 MG EC tablet Take 50 mg by mouth 2 (two) times daily.    docusate sodium (COLACE) 100 MG capsule Take 100 mg by mouth 2 (two) times daily as needed for Constipation.    DULoxetine (CYMBALTA) 20 MG capsule Take 20 mg by mouth once daily.    empagliflozin (JARDIANCE) 10 mg tablet Take 10 mg by mouth once daily.    fenofibrate (TRICOR) 145 MG tablet Take 145 mg by mouth.    furosemide (LASIX) 20 MG tablet Take 20 mg by mouth once daily.    gabapentin (NEURONTIN) 300 MG capsule Take 300 mg by mouth 2 (two) times daily.    lisinopriL-hydrochlorothiazide (PRINZIDE,ZESTORETIC) 20-12.5 mg per tablet Take 1 tablet by mouth once daily. FOR BLOOD PRESSURE    metoprolol tartrate (LOPRESSOR) 50 MG tablet Take 50 mg by mouth 2 (two) times daily.    pantoprazole (PROTONIX) 40 MG tablet Take 40 mg by mouth.    ranolazine (RANEXA) 500 MG Tb12 Take 500 mg by mouth 2 (two) times daily.    rosuvastatin (CRESTOR) 10 MG tablet Take 40 mg by mouth once daily.    spironolactone (ALDACTONE) 25 MG tablet Take 25 mg by mouth every morning.    SYMBICORT 80-4.5 mcg/actuation HFAA Inhale 1 puff into the lungs 2 (two) times daily.    citalopram (CELEXA) 20 MG tablet Take 20 mg by mouth.    diclofenac sodium (VOLTAREN) 1 % Gel Apply 1 g topically.    INVOKANA 100 mg Tab tablet TAKE 1 TABLET DAILY FOR DIABETES    isosorbide mononitrate (IMDUR) 30 MG 24 hr tablet Take 30 mg by mouth once daily.    JANUVIA 100 mg Tab TAKE 1 TABLET DAILY FOR DIABETES    LAMICTAL 25 mg tablet SMARTSI Tablet(s) By Mouth Every Evening    melatonin 12 mg Tab Take 1 tablet by mouth every evening.    metFORMIN (GLUCOPHAGE) 1000 MG tablet Take 1,000 mg by mouth every evening.    nicotine (NICODERM CQ) 14 mg/24 hr Apply topically.    nitroGLYCERIN (NITROSTAT) 0.4 MG SL tablet Place 0.4 mg under the tongue.    OLANZapine (ZYPREXA) 20 MG tablet  "Take 20 mg by mouth every evening.    pioglitazone (ACTOS) 15 MG tablet Take 15 mg by mouth every morning.    sucralfate (CARAFATE) 1 gram tablet Take 1 g by mouth 3 (three) times daily.    TRUE METRIX GLUCOSE TEST STRIP Strp SMARTSIG:Via Meter Daily     Family History       Problem Relation (Age of Onset)    Breast cancer Mother, Paternal Aunt, Paternal Grandmother          Tobacco Use    Smoking status: Every Day     Current packs/day: 1.00     Average packs/day: 1 pack/day for 47.0 years (47.0 ttl pk-yrs)     Types: Cigarettes    Smokeless tobacco: Never   Substance and Sexual Activity    Alcohol use: Never    Drug use: Never     Types: Marijuana     Comment: 40 years ago    Sexual activity: Not Currently     Partners: Male     Birth control/protection: See Surgical Hx     Review of Systems   Constitutional:  Positive for fatigue.   HENT: Negative.     Eyes: Negative.    Respiratory: Negative.     Cardiovascular:  Positive for chest pain.        Chest pain has resolved, not worse with exertion   Gastrointestinal:  Positive for nausea.   Genitourinary: Negative.    Musculoskeletal: Negative.    Skin: Negative.    Neurological:         Feels unsteady and "fuzzy"     Objective:     Vital Signs (Most Recent):  Temp: 98.2 °F (36.8 °C) (10/20/23 1232)  Pulse: 72 (10/20/23 1508)  Resp: 18 (10/20/23 1508)  BP: (!) 105/54 (10/20/23 1508)  SpO2: 97 % (10/20/23 1508) Vital Signs (24h Range):  Temp:  [97.8 °F (36.6 °C)-98.2 °F (36.8 °C)] 98.2 °F (36.8 °C)  Pulse:  [57-72] 72  Resp:  [16-20] 18  SpO2:  [95 %-98 %] 97 %  BP: ()/(54-79) 105/54     Weight: 83.5 kg (184 lb)  Body mass index is 35.94 kg/m².     Physical Exam  HENT:      Head: Normocephalic and atraumatic.      Nose: Nose normal.   Eyes:      Conjunctiva/sclera: Conjunctivae normal.   Cardiovascular:      Rate and Rhythm: Normal rate and regular rhythm.   Pulmonary:      Effort: Pulmonary effort is normal.      Breath sounds: Normal breath sounds. "   Abdominal:      Palpations: Abdomen is soft.   Musculoskeletal:         General: Normal range of motion.   Skin:     General: Skin is warm and dry.   Neurological:      Mental Status: She is alert and oriented to person, place, and time.   Psychiatric:         Mood and Affect: Mood normal.                Significant Labs: All pertinent labs within the past 24 hours have been reviewed.    Significant Imaging: I have reviewed all pertinent imaging results/findings within the past 24 hours.

## 2023-10-20 NOTE — ED PROVIDER NOTES
"Encounter Date: 10/20/2023       History     Chief Complaint   Patient presents with    Chest Pain    Dysuria     Pt has multiple complaints:  Pt c/o mid sub sternal chest "pressure" onset 6 hours pta.  Pt also reports "high sugar" at 188, low blood pressure, decreased and concentrated urination, nausea, and feeling "drunk".       Patient states she woke up this morning with some substernal chest pressure.  States it is about a 3/10 right now.  States she has also been nauseous and dizzy in feels drunk and unsteady.  Recently changed diabetes medication.    The history is provided by the patient.   Chest Pain  The current episode started today. The chest pain is improving. The quality of the pain is described as dull and tightness. The pain does not radiate. Primary symptoms include nausea and dizziness. Pertinent negatives for primary symptoms include no fever.   Dizziness also occurs with nausea.     Review of patient's allergies indicates:   Allergen Reactions    Pcn [penicillins] Hives    Wool Itching    Shrimp Hives and Itching    Fluoxetine Hallucinations    Omeprazole Palpitations     Past Medical History:   Diagnosis Date    Anxiety and depression     Bipolar disorder, unspecified     Chest pain     per CARDs: CORONARY MICROVASCULAR DYSFUNCTION    Chronic venous insufficiency     CKD (chronic kidney disease)     COPD (chronic obstructive pulmonary disease)     Diabetes mellitus, type 2     Diabetic peripheral neuropathy associated with type 2 diabetes mellitus     HLD (hyperlipidemia)     Hypertension     ARVIN (obstructive sleep apnea)     Osteoarthritis     PAD (peripheral artery disease)     w/NEUROGENIC CLAUDICATION    Personal history of colonic polyps     Joe Barnes MD     Past Surgical History:   Procedure Laterality Date    APPENDECTOMY      BILATERAL SALPINGO-OOPHORECTOMY (BSO)  2001    BREAST BIOPSY Right 2000    Benign    COLONOSCOPY  03/29/2018    EXCISION-ADENOMA  03/29/2018    OPEN REDUCTION " AND INTERNAL FIXATION (ORIF) OF FRACTURE OF DISTAL RADIUS Left 01/26/2023    Procedure: ORIF, FRACTURE, RADIUS, DISTAL;  Surgeon: Manny Mariscal MD;  Location: [facility];  Service: Orthopedics;  Laterality: Left;  supine, regular or bed with hand table, tourniquet, c-arm    TOTAL ABDOMINAL HYSTERECTOMY  12/29/1983     Family History   Problem Relation Age of Onset    Breast cancer Mother     Breast cancer Paternal Aunt     Breast cancer Paternal Grandmother     Ovarian cancer Neg Hx     Uterine cancer Neg Hx     Colon cancer Neg Hx     Cervical cancer Neg Hx      Social History     Tobacco Use    Smoking status: Every Day     Current packs/day: 1.00     Average packs/day: 1 pack/day for 47.0 years (47.0 ttl pk-yrs)     Types: Cigarettes    Smokeless tobacco: Never   Substance Use Topics    Alcohol use: Never    Drug use: Never     Types: Marijuana     Comment: 40 years ago     Review of Systems   Constitutional:  Negative for fever.   Cardiovascular:  Positive for chest pain.   Gastrointestinal:  Positive for nausea.   Neurological:  Positive for dizziness.   All other systems reviewed and are negative.      Physical Exam     Initial Vitals [10/20/23 1032]   BP Pulse Resp Temp SpO2   123/66 61 16 97.8 °F (36.6 °C) 97 %      MAP       --         Physical Exam    Nursing note and vitals reviewed.  Constitutional: She appears well-developed and well-nourished. She is not diaphoretic. No distress.   HENT:   Head: Normocephalic and atraumatic.   Nose: Nose normal.   Mouth/Throat: Oropharynx is clear and moist.   Eyes: Conjunctivae and EOM are normal. Pupils are equal, round, and reactive to light.   Neck: Neck supple. No tracheal deviation present.   Normal range of motion.  Cardiovascular:  Normal rate, intact distal pulses and normal pulses.     Exam reveals no decreased pulses.       Pulmonary/Chest: Effort normal. No respiratory distress.   Abdominal: Abdomen is soft. She exhibits no distension. There is no abdominal  tenderness.   Musculoskeletal:         General: No tenderness. Normal range of motion.      Cervical back: Normal range of motion and neck supple.      Comments: No acute change     Neurological: She is alert and oriented to person, place, and time. GCS score is 15. GCS eye subscore is 4. GCS verbal subscore is 5. GCS motor subscore is 6.   No acute change   Skin: Skin is warm and dry. No rash noted.   Psychiatric: She has a normal mood and affect. Thought content normal.         ED Course   Procedures  Labs Reviewed   CK - Abnormal; Notable for the following components:       Result Value    Creatine Kinase 336 (*)     All other components within normal limits   COMPREHENSIVE METABOLIC PANEL - Abnormal; Notable for the following components:    Sodium Level 125 (*)     Chloride 90 (*)     Glucose Level 157 (*)     Blood Urea Nitrogen 25.0 (*)     Creatinine 1.40 (*)     All other components within normal limits   URINALYSIS - Abnormal; Notable for the following components:    Glucose, UA >=1000 (*)     Nitrites, UA Positive (*)     Leukocyte Esterase, UA Trace (*)     All other components within normal limits    Narrative:      URINE STABILITY IS 2 HOURS AT ROOM TEMP OR    SIX HOURS REFRIGERATED. PERFORMING TESTING ON    SPECIMENS GREATER THAN THIS AGE MAY AFFECT THE    FOLLOWING TESTS:    PH          SPECIFIC GRAVITY           BLOOD    CLARITY     BILIRUBIN               UROBILINOGEN   CBC WITH DIFFERENTIAL - Abnormal; Notable for the following components:    Platelet 101 (*)     Lymph % 18.3 (*)     Neut # 7.52 (*)     Mono # 0.88 (*)     Baso # 0.09 (*)     IG# 0.19 (*)     IG% 1.8 (*)     All other components within normal limits   URINALYSIS, MICROSCOPIC - Abnormal; Notable for the following components:    Bacteria, UA 3+ (*)     WBC, UA 6-10 (*)     Squamous Epithelial Cells, UA Few (*)     All other components within normal limits   RAPID INFLUENZA A/B - Normal   TROPONIN I - Normal   CK-MB - Normal   DRUG  SCREEN, URINE (BEAKER) - Normal    Narrative:     Cut off concentrations:    Amphetamines - 1000 ng/ml  Barbiturates - 200 ng/ml  Benzodiazepine - 200 ng/ml  Cannabinoids (THC) - 50 ng/ml  Cocaine - 300 ng/ml  Fentanyl - 1.0 ng/ml  MDMA - 500 ng/ml  Opiates - 300 ng/ml   Phencyclidine (PCP) - 25 ng/ml  Methadone - 300 ng/ml      False negatives may result form substances such as bleach added to urine.  False positives may result for the presence of a substance with similar chemical structure to the drug or its metabolite.    This test provides only a PRELIMINARY analytical test result. A more specific alternate chemical method must be used in order to obtain a confirmed analytical result. Gas chromatography/mass spectrometry (GC/MS) is the preferred confirmatory method. Other chemical confirmation methods are available. Clinical consideration and professional judgement should be applied to any drug of abuse test result, particularly when preliminary positive results are used.    Positive results will be confirmed only at the physicians request. Unconfirmed screening results are to be used only for medical purposes (treatment).          LACTIC ACID, PLASMA - Normal   MAGNESIUM - Normal   NT-PRO NATRIURETIC PEPTIDE - Normal   SARS-COV-2 RNA AMPLIFICATION, QUAL - Normal   BLOOD CULTURE [facility]   BLOOD CULTURE [facility]   CULTURE, URINE   CBC W/ AUTO DIFFERENTIAL    Narrative:     The following orders were created for panel order CBC Auto Differential.  Procedure                               Abnormality         Status                     ---------                               -----------         ------                     CBC with Differential[[id number]]       Abnormal            Final result                 Please view results for these tests on the individual orders.     EKG Readings: (Independently Interpreted)   Initial Reading: No STEMI. Rhythm: Normal Sinus Rhythm. Heart Rate: 66. Ectopy: No Ectopy. Conduction: Normal.  ST Segments: Normal ST Segments. T Waves: Normal. Axis: Normal. Clinical Impression: Normal Sinus Rhythm       Imaging Results              CT Head Without Contrast (Final result)  Result time 10/20/23 11:29:08      Final result by Andrews Kenyon MD (10/20/23 11:29:08)                   Impression:      No acute intracranial abnormality.      Electronically signed by: Andrews Kenyon MD  Date:    10/20/2023  Time:    11:29               Narrative:    EXAMINATION:  CT of the head without contrast    CLINICAL HISTORY:  Dizziness    TECHNIQUE:  Routine CT of the head was performed without contrast    Total DLP: 1125.6 mGy.cm    Automatic exposure control was utilized to reduce the patient's dose    COMPARISON:  01/04/2023    FINDINGS:  There is no acute intracranial hemorrhage, midline shift, mass-effect, or extra-axial collection.  The ventricles are normal in size and configuration.  There is no sulcal effacement.  Gray-white matter differentiation is preserved.  The visualized paranasal sinuses and mastoid air cells are clear.                                       X-Ray Chest 1 View (Final result)  Result time 10/20/23 11:10:51      Final result by Andrews Kenyon MD (10/20/23 11:10:51)                   Impression:      No acute cardiopulmonary process      Electronically signed by: Andrews Kenyon MD  Date:    10/20/2023  Time:    11:10               Narrative:    EXAMINATION:  Chest one view    CLINICAL HISTORY:  Dyspnea    COMPARISON:  01/23/2023    FINDINGS:  Cardiac silhouette is normal in size.  Central vessels are normal.  No confluent airspace disease.  No visible pneumothorax or pleural effusion.  No acute osseous finding.                                    X-Rays:   Independently Interpreted Readings:   Chest X-Ray: No infiltrates.   Head CT: No acute stroke.  No hemorrhage.     Medications   aspirin tablet 325 mg (325 mg Oral Given 10/20/23 1222)   nitroGLYCERIN SL tablet 0.4 mg (0.4 mg Sublingual  Given 10/20/23 1222)   cefTRIAXone (ROCEPHIN) 1 g in dextrose 5 % in water (D5W) 100 mL IVPB (MB+) (1 g Intravenous New Bag 10/20/23 1246)   sodium chloride 0.9% bolus 1,000 mL 1,000 mL (1,000 mLs Intravenous New Bag 10/20/23 1249)     Medical Decision Making  Problems Addressed:  Acute renal failure, unspecified acute renal failure type: acute illness or injury with systemic symptoms  Hyponatremia: acute illness or injury with systemic symptoms  Unsteadiness on feet: acute illness or injury  Urinary tract infection without hematuria, site unspecified: acute illness or injury with systemic symptoms    Amount and/or Complexity of Data Reviewed  Labs: ordered. Decision-making details documented in ED Course.  Radiology: ordered and independent interpretation performed. Decision-making details documented in ED Course.  ECG/medicine tests: ordered and independent interpretation performed. Decision-making details documented in ED Course.  Discussion of management or test interpretation with external provider(s): Discssed case with Tele-hospitalist    Risk  OTC drugs.  Prescription drug management.      Additional MDM:   Differential Diagnosis:   Symptom: Chest pain. <> The follow diagnoses were considered and will be evaluated: Acute MI, Chest Wall Pain, Costochondritis, Esophagitis, GERD, Myocarditis, Pancreatitis, Pericarditis, Pleural Effusion, Pleurisy, ST Elevation MI and Unstable Angina.   Symptom: Headache. <> The follow diagnoses were considered and will be evaluated: Cerebral Aneurysm, Cerebral Hemorrhage, Intracranial Hemorrhage and Migraine Headache.   ACS, mi, CVA, arrhythmia, medication effect, hyperglycemia, metabolic disorder, UTI, sepsis                             Clinical Impression:   Final diagnoses:  [R06.00] Dyspnea  [R26.81] Unsteadiness on feet (Primary)  [N39.0] Urinary tract infection without hematuria, site unspecified  [E87.1] Hyponatremia  [N17.9] Acute renal failure, unspecified acute renal  failure type        ED Disposition Condition    Observation Stable                Evens Daniels MD  10/20/23 2117

## 2023-10-20 NOTE — Clinical Note
Diagnosis: Unsteadiness on feet [127501]   Future Attending Provider: JONI TRAN [[id number]]   Admitting Provider:: JONI TRAN [[id number]]

## 2023-10-20 NOTE — HPI
"Pt is a 64 yo female with PMH of DM, Bipolar d/o, anxiety, ARVIN, CKD, COPD, HLD, OA, PAD, peripheral neuropathy due to DM, presented with nausea, "fuzzy feeling", and feeling unsteady. Pt reports that she has not been feeling well for several days.  Pt denies dysuria, fever, chills, sweats, abd pain, vomiting. Pt admits to chest tightness that has resolved. EKG no acute changes. Pt also denies sob, cough, congestion. Pt has baseline sob with exertion that is chronic and unchanged. Pt also denies edema, diarrhea. Pt admits to constipation.   "

## 2023-10-20 NOTE — H&P
"[facility] Medicine  History & Physical    Patient Name: Adilia Gregg  MRN: [id number]  Patient Class: OP- Observation  Admission Date: 10/20/2023  Attending Physician: Niki Eubanks DO  Primary Care Provider: Amie, Primary Doctor         Patient information was obtained from patient and ER records.     Subjective:     Principal Problem:<principal problem not specified>    Chief Complaint:   Chief Complaint   Patient presents with    Chest Pain    Dysuria     Pt has multiple complaints:  Pt c/o mid sub sternal chest "pressure" onset 6 hours pta.  Pt also reports "high sugar" at 188, low blood pressure, decreased and concentrated urination, nausea, and feeling "drunk".          HPI: Pt is a 62 yo female with PMH of DM, Bipolar d/o, anxiety, ARVIN, CKD, COPD, HLD, OA, PAD, peripheral neuropathy due to DM, presented with nausea, "fuzzy feeling", and feeling unsteady. Pt reports that she has not been feeling well for several days.  Pt denies dysuria, fever, chills, sweats, abd pain, vomiting. Pt admits to chest tightness that has resolved. EKG no acute changes. Pt also denies sob, cough, congestion. Pt has baseline sob with exertion that is chronic and unchanged. Pt also denies edema, diarrhea. Pt admits to constipation.       Past Medical History:   Diagnosis Date    Anxiety and depression     Bipolar disorder, unspecified     Chest pain     per CARDs: CORONARY MICROVASCULAR DYSFUNCTION    Chronic venous insufficiency     CKD (chronic kidney disease)     COPD (chronic obstructive pulmonary disease)     Diabetes mellitus, type 2     Diabetic peripheral neuropathy associated with type 2 diabetes mellitus     HLD (hyperlipidemia)     Hypertension     ARVIN (obstructive sleep apnea)     Osteoarthritis     PAD (peripheral artery disease)     w/NEUROGENIC CLAUDICATION    Personal history of colonic polyps     Joe Barnes MD       Past Surgical History:   Procedure Laterality Date "    APPENDECTOMY      BILATERAL SALPINGO-OOPHORECTOMY (BSO)      BREAST BIOPSY Right 2000    Benign    COLONOSCOPY  2018    EXCISION-ADENOMA  2018    OPEN REDUCTION AND INTERNAL FIXATION (ORIF) OF FRACTURE OF DISTAL RADIUS Left 2023    Procedure: ORIF, FRACTURE, RADIUS, DISTAL;  Surgeon: Manny Mariscal MD;  Location: [facility];  Service: Orthopedics;  Laterality: Left;  supine, regular or bed with hand table, tourniquet, c-arm    TOTAL ABDOMINAL HYSTERECTOMY  1983       Review of patient's allergies indicates:   Allergen Reactions    Pcn [penicillins] Hives    Wool Itching    Shrimp Hives and Itching    Fluoxetine Hallucinations    Omeprazole Palpitations       Current Facility-Administered Medications on File Prior to Encounter   Medication    dextrose 10% bolus 125 mL 125 mL    dextrose 10% bolus 125 mL 125 mL    insulin aspart U-100 injection 2-9 Units    insulin aspart U-100 injection 4-12 Units    LIDOcaine (PF) 10 mg/ml (1%) injection 10 mg     Current Outpatient Medications on File Prior to Encounter   Medication Sig    albuterol (PROVENTIL/VENTOLIN HFA) 90 mcg/actuation inhaler SMARTSI Puff(s) Via Inhaler 4 Times Daily PRN    ALLERGY RELIEF, LORATADINE, 10 mg tablet Take 10 mg by mouth.    aspirin (ECOTRIN) 81 MG EC tablet Take 81 mg by mouth once daily.    busPIRone (BUSPAR) 15 MG tablet Take 7.5 mg by mouth 2 (two) times daily.    diclofenac (VOLTAREN) 50 MG EC tablet Take 50 mg by mouth 2 (two) times daily.    docusate sodium (COLACE) 100 MG capsule Take 100 mg by mouth 2 (two) times daily as needed for Constipation.    DULoxetine (CYMBALTA) 20 MG capsule Take 20 mg by mouth once daily.    empagliflozin (JARDIANCE) 10 mg tablet Take 10 mg by mouth once daily.    fenofibrate (TRICOR) 145 MG tablet Take 145 mg by mouth.    furosemide (LASIX) 20 MG tablet Take 20 mg by mouth once daily.    gabapentin (NEURONTIN) 300 MG capsule Take 300 mg by mouth 2  (two) times daily.    lisinopriL-hydrochlorothiazide (PRINZIDE,ZESTORETIC) 20-12.5 mg per tablet Take 1 tablet by mouth once daily. FOR BLOOD PRESSURE    metoprolol tartrate (LOPRESSOR) 50 MG tablet Take 50 mg by mouth 2 (two) times daily.    pantoprazole (PROTONIX) 40 MG tablet Take 40 mg by mouth.    ranolazine (RANEXA) 500 MG Tb12 Take 500 mg by mouth 2 (two) times daily.    rosuvastatin (CRESTOR) 10 MG tablet Take 40 mg by mouth once daily.    spironolactone (ALDACTONE) 25 MG tablet Take 25 mg by mouth every morning.    SYMBICORT 80-4.5 mcg/actuation HFAA Inhale 1 puff into the lungs 2 (two) times daily.    citalopram (CELEXA) 20 MG tablet Take 20 mg by mouth.    diclofenac sodium (VOLTAREN) 1 % Gel Apply 1 g topically.    INVOKANA 100 mg Tab tablet TAKE 1 TABLET DAILY FOR DIABETES    isosorbide mononitrate (IMDUR) 30 MG 24 hr tablet Take 30 mg by mouth once daily.    JANUVIA 100 mg Tab TAKE 1 TABLET DAILY FOR DIABETES    LAMICTAL 25 mg tablet SMARTSI Tablet(s) By Mouth Every Evening    melatonin 12 mg Tab Take 1 tablet by mouth every evening.    metFORMIN (GLUCOPHAGE) 1000 MG tablet Take 1,000 mg by mouth every evening.    nicotine (NICODERM CQ) 14 mg/24 hr Apply topically.    nitroGLYCERIN (NITROSTAT) 0.4 MG SL tablet Place 0.4 mg under the tongue.    OLANZapine (ZYPREXA) 20 MG tablet Take 20 mg by mouth every evening.    pioglitazone (ACTOS) 15 MG tablet Take 15 mg by mouth every morning.    sucralfate (CARAFATE) 1 gram tablet Take 1 g by mouth 3 (three) times daily.    TRUE METRIX GLUCOSE TEST STRIP Strp SMARTSIG:Via Meter Daily     Family History       Problem Relation (Age of Onset)    Breast cancer Mother, Paternal Aunt, Paternal Grandmother          Tobacco Use    Smoking status: Every Day     Current packs/day: 1.00     Average packs/day: 1 pack/day for 47.0 years (47.0 ttl pk-yrs)     Types: Cigarettes    Smokeless tobacco: Never   Substance and Sexual Activity    Alcohol  "use: Never    Drug use: Never     Types: Marijuana     Comment: 40 years ago    Sexual activity: Not Currently     Partners: Male     Birth control/protection: See Surgical Hx     Review of Systems   Constitutional:  Positive for fatigue.   HENT: Negative.     Eyes: Negative.    Respiratory: Negative.     Cardiovascular:  Positive for chest pain.        Chest pain has resolved, not worse with exertion   Gastrointestinal:  Positive for nausea.   Genitourinary: Negative.    Musculoskeletal: Negative.    Skin: Negative.    Neurological:         Feels unsteady and "fuzzy"     Objective:     Vital Signs (Most Recent):  Temp: 98.2 °F (36.8 °C) (10/20/23 1232)  Pulse: 72 (10/20/23 1508)  Resp: 18 (10/20/23 1508)  BP: (!) 105/54 (10/20/23 1508)  SpO2: 97 % (10/20/23 1508) Vital Signs (24h Range):  Temp:  [97.8 °F (36.6 °C)-98.2 °F (36.8 °C)] 98.2 °F (36.8 °C)  Pulse:  [57-72] 72  Resp:  [16-20] 18  SpO2:  [95 %-98 %] 97 %  BP: ()/(54-79) 105/54     Weight: 83.5 kg (184 lb)  Body mass index is 35.94 kg/m².     Physical Exam  HENT:      Head: Normocephalic and atraumatic.      Nose: Nose normal.   Eyes:      Conjunctiva/sclera: Conjunctivae normal.   Cardiovascular:      Rate and Rhythm: Normal rate and regular rhythm.   Pulmonary:      Effort: Pulmonary effort is normal.      Breath sounds: Normal breath sounds.   Abdominal:      Palpations: Abdomen is soft.   Musculoskeletal:         General: Normal range of motion.   Skin:     General: Skin is warm and dry.   Neurological:      Mental Status: She is alert and oriented to person, place, and time.   Psychiatric:         Mood and Affect: Mood normal.                Significant Labs: All pertinent labs within the past 24 hours have been reviewed.    Significant Imaging: I have reviewed all pertinent imaging results/findings within the past 24 hours.    Assessment/Plan:     UTI (urinary tract infection)  Follow up urine culture  IV CTX      Hyponatremia  Patient has " hyponatremia which is uncontrolled,We will aim to correct the sodium by 4-6mEq in 24 hours. We will monitor sodium every 6 hours. The hyponatremia is due to Dehydration/hypovolemia. We will obtain the following studies: Urine sodium, urine osmolality, serum osmolality. We will treat the hyponatremia with IV fluids. The patient's sodium results have been reviewed and are listed below.  Recent Labs   Lab 10/20/23  1059   *   Also check TSH in am     Hold buspar, and SSRI    Constipation  Colace, dulcolax supp, miralax      Tobacco abuse  Cessation counseling      Chest pain  ekg no acute changes  Trend cardiac enzymes  Pain resolved  NTG SL prn        VTE Risk Mitigation (From admission, onward)         Ordered     enoxaparin injection 40 mg  Daily         10/20/23 1542     IP VTE HIGH RISK PATIENT  Once         10/20/23 1542                   On 10/20/2023, patient should be placed in hospital observation services under my care.    Service was provided using HIPPA compliant web platform using SOC for audio/visual equipment  Patient Location: [place]  Provider Location:Home  Participants on call: bedside RN, patient  Physician on call : Dr. Eubanks    Code status: full code  DVT prophylaxis: sergeyenox        Niki Eubanks DO  Department of Hospital Medicine  [facility]

## 2023-10-21 LAB
ALBUMIN SERPL-MCNC: 3.9 G/DL (ref 3.4–5)
ALBUMIN/GLOB SERPL: 1.6 RATIO
ALP SERPL-CCNC: 61 UNIT/L (ref 50–144)
ALT SERPL-CCNC: 22 UNIT/L (ref 1–45)
ANION GAP SERPL CALC-SCNC: 4 MEQ/L (ref 2–13)
AST SERPL-CCNC: 26 UNIT/L (ref 14–36)
BASOPHILS # BLD AUTO: 0.05 X10(3)/MCL (ref 0.01–0.08)
BASOPHILS NFR BLD AUTO: 0.6 % (ref 0.1–1.2)
BILIRUB SERPL-MCNC: 0.3 MG/DL (ref 0–1)
BUN SERPL-MCNC: 21 MG/DL (ref 7–20)
CALCIUM SERPL-MCNC: 9.1 MG/DL (ref 8.4–10.2)
CHLORIDE SERPL-SCNC: 102 MMOL/L (ref 98–110)
CO2 SERPL-SCNC: 26 MMOL/L (ref 21–32)
CREAT SERPL-MCNC: 1.09 MG/DL (ref 0.66–1.25)
CREAT UR-MCNC: 25 MG/DL
CREAT/UREA NIT SERPL: 19 (ref 12–20)
EOSINOPHIL # BLD AUTO: 0.09 X10(3)/MCL (ref 0.04–0.36)
EOSINOPHIL NFR BLD AUTO: 1.1 % (ref 0.7–7)
ERYTHROCYTE [DISTWIDTH] IN BLOOD BY AUTOMATED COUNT: 13.6 % (ref 11–14.5)
GFR SERPLBLD CREATININE-BSD FMLA CKD-EPI: 57 MLS/MIN/1.73/M2
GLOBULIN SER-MCNC: 2.4 GM/DL (ref 2–3.9)
GLUCOSE SERPL-MCNC: 132 MG/DL (ref 70–115)
HCT VFR BLD AUTO: 35.3 % (ref 36–48)
HGB BLD-MCNC: 12.1 G/DL (ref 11.8–16)
IMM GRANULOCYTES # BLD AUTO: 0.18 X10(3)/MCL (ref 0–0.03)
IMM GRANULOCYTES NFR BLD AUTO: 2.1 % (ref 0–0.5)
LYMPHOCYTES # BLD AUTO: 2.14 X10(3)/MCL (ref 1.16–3.74)
LYMPHOCYTES NFR BLD AUTO: 25.2 % (ref 20–55)
MCH RBC QN AUTO: 29.7 PG (ref 27–34)
MCHC RBC AUTO-ENTMCNC: 34.3 G/DL (ref 31–37)
MCV RBC AUTO: 86.7 FL (ref 79–99)
MONOCYTES # BLD AUTO: 0.89 X10(3)/MCL (ref 0.24–0.36)
MONOCYTES NFR BLD AUTO: 10.5 % (ref 4.7–12.5)
NEUTROPHILS # BLD AUTO: 5.15 X10(3)/MCL (ref 1.56–6.13)
NEUTROPHILS NFR BLD AUTO: 60.5 % (ref 37–73)
OSMOLALITY UR: 426 MOSM/KG (ref 300–1300)
PLATELET # BLD AUTO: 96 X10(3)/MCL (ref 140–371)
PMV BLD AUTO: 10.9 FL (ref 9.4–12.4)
POCT GLUCOSE: 113 MG/DL (ref 70–110)
POCT GLUCOSE: 127 MG/DL (ref 70–110)
POCT GLUCOSE: 242 MG/DL (ref 70–110)
POCT GLUCOSE: 260 MG/DL (ref 70–110)
POTASSIUM SERPL-SCNC: 4.6 MMOL/L (ref 3.5–5.1)
PROT SERPL-MCNC: 6.3 GM/DL (ref 6.3–8.2)
RBC # BLD AUTO: 4.07 X10(6)/MCL (ref 4–5.1)
SODIUM SERPL-SCNC: 132 MMOL/L (ref 135–145)
SODIUM SERPL-SCNC: 133 MMOL/L (ref 135–145)
SODIUM SERPL-SCNC: 133 MMOL/L (ref 135–145)
SODIUM UR-SCNC: 90 MMOL/L
TSH SERPL-ACNC: 0.98 UIU/ML (ref 0.36–3.74)
WBC # SPEC AUTO: 8.5 X10(3)/MCL (ref 4–11.5)

## 2023-10-21 PROCEDURE — 94761 N-INVAS EAR/PLS OXIMETRY MLT: CPT

## 2023-10-21 PROCEDURE — 84443 ASSAY THYROID STIM HORMONE: CPT | Performed by: HOSPITALIST

## 2023-10-21 PROCEDURE — 99900035 HC TECH TIME PER 15 MIN (STAT)

## 2023-10-21 PROCEDURE — 84295 ASSAY OF SERUM SODIUM: CPT | Performed by: HOSPITALIST

## 2023-10-21 PROCEDURE — G0378 HOSPITAL OBSERVATION PER HR: HCPCS

## 2023-10-21 PROCEDURE — 25000003 PHARM REV CODE 250: Performed by: HOSPITALIST

## 2023-10-21 PROCEDURE — 80053 COMPREHEN METABOLIC PANEL: CPT | Performed by: HOSPITALIST

## 2023-10-21 PROCEDURE — 84300 ASSAY OF URINE SODIUM: CPT | Performed by: HOSPITALIST

## 2023-10-21 PROCEDURE — 63600175 PHARM REV CODE 636 W HCPCS: Performed by: INTERNAL MEDICINE

## 2023-10-21 PROCEDURE — 25000003 PHARM REV CODE 250: Performed by: FAMILY MEDICINE

## 2023-10-21 PROCEDURE — 63600175 PHARM REV CODE 636 W HCPCS: Performed by: HOSPITALIST

## 2023-10-21 PROCEDURE — 82570 ASSAY OF URINE CREATININE: CPT | Performed by: HOSPITALIST

## 2023-10-21 PROCEDURE — 96366 THER/PROPH/DIAG IV INF ADDON: CPT

## 2023-10-21 PROCEDURE — 25000242 PHARM REV CODE 250 ALT 637 W/ HCPCS: Performed by: FAMILY MEDICINE

## 2023-10-21 PROCEDURE — 96372 THER/PROPH/DIAG INJ SC/IM: CPT | Performed by: INTERNAL MEDICINE

## 2023-10-21 PROCEDURE — 84295 ASSAY OF SERUM SODIUM: CPT | Mod: 91 | Performed by: HOSPITALIST

## 2023-10-21 PROCEDURE — 96361 HYDRATE IV INFUSION ADD-ON: CPT

## 2023-10-21 PROCEDURE — 36415 COLL VENOUS BLD VENIPUNCTURE: CPT | Performed by: HOSPITALIST

## 2023-10-21 PROCEDURE — 85025 COMPLETE CBC W/AUTO DIFF WBC: CPT | Performed by: HOSPITALIST

## 2023-10-21 PROCEDURE — 94640 AIRWAY INHALATION TREATMENT: CPT

## 2023-10-21 PROCEDURE — 25000003 PHARM REV CODE 250: Performed by: INTERNAL MEDICINE

## 2023-10-21 PROCEDURE — 83930 ASSAY OF BLOOD OSMOLALITY: CPT | Performed by: HOSPITALIST

## 2023-10-21 PROCEDURE — 25000242 PHARM REV CODE 250 ALT 637 W/ HCPCS: Performed by: INTERNAL MEDICINE

## 2023-10-21 PROCEDURE — 83935 ASSAY OF URINE OSMOLALITY: CPT | Performed by: HOSPITALIST

## 2023-10-21 RX ORDER — ATORVASTATIN CALCIUM 20 MG/1
20 TABLET, FILM COATED ORAL DAILY
Status: DISCONTINUED | OUTPATIENT
Start: 2023-10-21 | End: 2023-10-22 | Stop reason: HOSPADM

## 2023-10-21 RX ORDER — METOPROLOL TARTRATE 50 MG/1
50 TABLET ORAL 2 TIMES DAILY
Status: DISCONTINUED | OUTPATIENT
Start: 2023-10-21 | End: 2023-10-22 | Stop reason: HOSPADM

## 2023-10-21 RX ORDER — SUCRALFATE 1 G/1
1 TABLET ORAL
Status: DISCONTINUED | OUTPATIENT
Start: 2023-10-21 | End: 2023-10-22 | Stop reason: HOSPADM

## 2023-10-21 RX ORDER — OLANZAPINE 5 MG/1
20 TABLET ORAL NIGHTLY
Status: DISCONTINUED | OUTPATIENT
Start: 2023-10-21 | End: 2023-10-22 | Stop reason: HOSPADM

## 2023-10-21 RX ORDER — PANTOPRAZOLE SODIUM 40 MG/1
40 TABLET, DELAYED RELEASE ORAL DAILY
Status: DISCONTINUED | OUTPATIENT
Start: 2023-10-21 | End: 2023-10-22 | Stop reason: HOSPADM

## 2023-10-21 RX ADMIN — BUSPIRONE HYDROCHLORIDE 7.5 MG: 7.5 TABLET ORAL at 08:10

## 2023-10-21 RX ADMIN — CEFTRIAXONE SODIUM 1 G: 1 INJECTION, POWDER, FOR SOLUTION INTRAMUSCULAR; INTRAVENOUS at 01:10

## 2023-10-21 RX ADMIN — ALBUTEROL SULFATE 2.5 MG: 2.5 SOLUTION RESPIRATORY (INHALATION) at 04:10

## 2023-10-21 RX ADMIN — BUDESONIDE 0.25 MG: 0.25 INHALANT RESPIRATORY (INHALATION) at 07:10

## 2023-10-21 RX ADMIN — DICLOFENAC SODIUM 2 G: 10 GEL TOPICAL at 08:10

## 2023-10-21 RX ADMIN — CITALOPRAM HYDROBROMIDE 20 MG: 20 TABLET ORAL at 08:10

## 2023-10-21 RX ADMIN — GABAPENTIN 300 MG: 300 CAPSULE ORAL at 08:10

## 2023-10-21 RX ADMIN — EMPAGLIFLOZIN 10 MG: 10 TABLET, FILM COATED ORAL at 08:10

## 2023-10-21 RX ADMIN — PANTOPRAZOLE SODIUM 40 MG: 40 TABLET, DELAYED RELEASE ORAL at 11:10

## 2023-10-21 RX ADMIN — ALBUTEROL SULFATE 2.5 MG: 2.5 SOLUTION RESPIRATORY (INHALATION) at 11:10

## 2023-10-21 RX ADMIN — DULOXETINE HYDROCHLORIDE 20 MG: 20 CAPSULE, DELAYED RELEASE ORAL at 08:10

## 2023-10-21 RX ADMIN — Medication 12 MG: at 08:10

## 2023-10-21 RX ADMIN — METOPROLOL TARTRATE 50 MG: 50 TABLET, FILM COATED ORAL at 11:10

## 2023-10-21 RX ADMIN — ALBUTEROL SULFATE 2.5 MG: 2.5 SOLUTION RESPIRATORY (INHALATION) at 07:10

## 2023-10-21 RX ADMIN — INSULIN ASPART 1 UNITS: 100 INJECTION, SOLUTION INTRAVENOUS; SUBCUTANEOUS at 08:10

## 2023-10-21 RX ADMIN — INSULIN ASPART 3 UNITS: 100 INJECTION, SOLUTION INTRAVENOUS; SUBCUTANEOUS at 11:10

## 2023-10-21 RX ADMIN — OLANZAPINE 20 MG: 5 TABLET, FILM COATED ORAL at 08:10

## 2023-10-21 RX ADMIN — POLYETHYLENE GLYCOL 3350 17 G: 17 POWDER, FOR SOLUTION ORAL at 08:10

## 2023-10-21 RX ADMIN — SUCRALFATE 1 G: 1 TABLET ORAL at 04:10

## 2023-10-21 RX ADMIN — METOPROLOL TARTRATE 50 MG: 50 TABLET, FILM COATED ORAL at 08:10

## 2023-10-21 RX ADMIN — SODIUM CHLORIDE: 9 INJECTION, SOLUTION INTRAVENOUS at 09:10

## 2023-10-21 RX ADMIN — ATORVASTATIN CALCIUM 20 MG: 20 TABLET, FILM COATED ORAL at 11:10

## 2023-10-21 NOTE — PROGRESS NOTES
"Hospital Medicine  Progress Note    Patient Name: Adilia Gregg  MRN: [id number]  Status: OP- Observation   Admission Date: 10/20/2023  Length of Stay: 0  Date of Service: 10/21/2023       CC: hospital follow-up for        SUBJECTIVE   64 yo female with PMH of DM, Bipolar d/o, anxiety, ARVIN, CKD, COPD, HLD, OA, PAD, peripheral neuropathy due to DM, presented with nausea, "fuzzy feeling", and feeling unsteady. Pt reports that she has not been feeling well for several days.  Pt denies dysuria, fever, chills, sweats, abd pain, vomiting. Pt admits to chest tightness that has resolved. EKG no acute changes. Pt also denies sob, cough, congestion. Pt has baseline sob with exertion that is chronic and unchanged. Pt also denies edema, diarrhea. Pt admits to constipation.    10/21/2023  Sodium up to 133 today   Feeling much better   Urine culture gram negative rods > 100,000 on abx    Today: Patient seen and examined at bedside, and chart reviewed.       MEDICATIONS   Scheduled   albuterol sulfate  2.5 mg Nebulization QID    atorvastatin  20 mg Oral Daily    budesonide  0.25 mg Nebulization Q12H    busPIRone  7.5 mg Oral BID    cefTRIAXone (ROCEPHIN) IVPB  1 g Intravenous Q24H    citalopram  20 mg Oral Daily    diclofenac sodium  2 g Topical (Top) TID    DULoxetine  20 mg Oral Daily    empagliflozin  10 mg Oral Daily    enoxparin  40 mg Subcutaneous Daily    gabapentin  300 mg Oral BID    melatonin  12 mg Oral QHS    metoprolol tartrate  50 mg Oral BID    OLANZapine  20 mg Oral QHS    pantoprazole  40 mg Oral Daily    polyethylene glycol  17 g Oral Daily    sucralfate  1 g Oral TID AC     Continuous Infusions        PHYSICAL EXAM   VITALS: T 97.3 °F (36.3 °C)   /70   P 81   RR 18   O2 97 %    GENERAL: Awake and in NAD  LUNGS: CTA B/L  CVS: Normal rate  GI/: Soft, NT, bowel sounds positive.  EXTREMITIES: No peripheral edema  NEURO: AAOx3  PSYCH: Cooperative      LABS   CBC  Recent Labs     10/20/23  1059 10/21/23  0708 "   WBC 10.78 8.50   RBC 4.57 4.07   HGB 13.4 12.1   HCT 39.0 35.3*   MCV 85.3 86.7   MCH 29.3 29.7   MCHC 34.4 34.3   RDW 13.4 13.6   * 96*     CHEM  Recent Labs     10/20/23  1059 10/20/23  1904 10/21/23  0100 10/21/23  0708   * 130* 133* 133*  132*   K 4.8 4.1  --  4.6   CHLORIDE 90* 100  --  102   CO2 24 23  --  26   BUN 25.0* 22.0*  --  21.0*   CREATININE 1.40* 1.16  --  1.09   GLUCOSE 157* 173*  --  132*   CALCIUM 9.5 8.4  --  9.1   MG 2.30  --   --   --    ALBUMIN 4.8  --   --  3.9   GLOBULIN 2.8  --   --  2.4   ALKPHOS 69  --   --  61   ALT 30  --   --  22   AST 31  --   --  26   BILITOT 0.7  --   --  0.3   TSH  --   --   --  0.983         MICROBIOLOGY     Microbiology Results (last 7 days)       Procedure Component Value Units Date/Time    Blood Culture [[id number]] Collected: 10/20/23 1059    Order Status: Completed Specimen: Blood from Antecubital, Left Updated: 10/21/23 1200     CULTURE, BLOOD ([facility]) No Growth At 24 Hours    Blood Culture [[id number]] Collected: 10/20/23 1051    Order Status: Completed Specimen: Blood from Antecubital, Right Updated: 10/21/23 1200     CULTURE, BLOOD ([facility]) No Growth At 24 Hours    Urine culture [[id number]]  (Abnormal) Collected: 10/20/23 1123    Order Status: Completed Specimen: Urine, Clean Catch Updated: 10/21/23 0743     Urine Culture >/= 100,000 colonies/ml Gram-negative Rods    Rapid Influenza A/B [[id number]]  (Normal) Collected: 10/20/23 1051    Order Status: Completed Specimen: Nasopharyngeal Updated: 10/20/23 1123     Influenza A Negative     Influenza B Negative              DIAGNOSTICS   CT Head Without Contrast  Narrative: EXAMINATION:  CT of the head without contrast    CLINICAL HISTORY:  Dizziness    TECHNIQUE:  Routine CT of the head was performed without contrast    Total DLP: 1125.6 mGy.cm    Automatic exposure control was utilized to reduce the patient's dose    COMPARISON:  01/04/2023    FINDINGS:  There is no acute intracranial hemorrhage,  midline shift, mass-effect, or extra-axial collection.  The ventricles are normal in size and configuration.  There is no sulcal effacement.  Gray-white matter differentiation is preserved.  The visualized paranasal sinuses and mastoid air cells are clear.  Impression: No acute intracranial abnormality.    Electronically signed by: Andrews Kenyon MD  Date:    10/20/2023  Time:    11:29  X-Ray Chest 1 View  Narrative: EXAMINATION:  Chest one view    CLINICAL HISTORY:  Dyspnea    COMPARISON:  01/23/2023    FINDINGS:  Cardiac silhouette is normal in size.  Central vessels are normal.  No confluent airspace disease.  No visible pneumothorax or pleural effusion.  No acute osseous finding.  Impression: No acute cardiopulmonary process    Electronically signed by: Andrews Kenyon MD  Date:    10/20/2023  Time:    11:10        ASSESSMENT     UTI (urinary tract infection)  Follow up urine culture  Gram neg kristy > 100,000  Continue IV Rocephin         Hyponatremia, 133 today   Patient has hyponatremia which is uncontrolled,We will aim to correct the sodium by 4-6mEq in 24 hours. We will monitor sodium every 6 hours. The hyponatremia is due to Dehydration/hypovolemia. We will obtain the following studies: Urine sodium, urine osmolality, serum osmolality. We will treat the hyponatremia with IV fluids. The patient's sodium results have been reviewed and are listed below.      Recent Labs   Lab 10/20/23  1059   *   Also check TSH in am      Hold buspar, and SSRI     Constipation  Colace, dulcolax supp, miralax        Tobacco abuse  Cessation counseling        Chest pain  ekg no acute changes  Trend cardiac enzymes  Pain resolved  NTG SL prn           Kennedy Carbajal MD  [facility]

## 2023-10-22 VITALS
WEIGHT: 187.63 LBS | SYSTOLIC BLOOD PRESSURE: 138 MMHG | HEIGHT: 60 IN | BODY MASS INDEX: 36.84 KG/M2 | HEART RATE: 68 BPM | TEMPERATURE: 97 F | DIASTOLIC BLOOD PRESSURE: 73 MMHG | RESPIRATION RATE: 95 BRPM | OXYGEN SATURATION: 18 %

## 2023-10-22 LAB
ALBUMIN SERPL-MCNC: 4.1 G/DL (ref 3.4–5)
ALBUMIN/GLOB SERPL: 1.8 RATIO
ALP SERPL-CCNC: 53 UNIT/L (ref 50–144)
ALT SERPL-CCNC: 21 UNIT/L (ref 1–45)
ANION GAP SERPL CALC-SCNC: 5 MEQ/L (ref 2–13)
AST SERPL-CCNC: 25 UNIT/L (ref 14–36)
BACTERIA UR CULT: ABNORMAL
BASOPHILS # BLD AUTO: 0.06 X10(3)/MCL (ref 0.01–0.08)
BASOPHILS NFR BLD AUTO: 0.7 % (ref 0.1–1.2)
BILIRUB SERPL-MCNC: 0.3 MG/DL (ref 0–1)
BUN SERPL-MCNC: 17 MG/DL (ref 7–20)
CALCIUM SERPL-MCNC: 10.1 MG/DL (ref 8.4–10.2)
CHLORIDE SERPL-SCNC: 107 MMOL/L (ref 98–110)
CO2 SERPL-SCNC: 27 MMOL/L (ref 21–32)
CREAT SERPL-MCNC: 1.07 MG/DL (ref 0.66–1.25)
CREAT/UREA NIT SERPL: 16 (ref 12–20)
EOSINOPHIL # BLD AUTO: 0.13 X10(3)/MCL (ref 0.04–0.36)
EOSINOPHIL NFR BLD AUTO: 1.4 % (ref 0.7–7)
ERYTHROCYTE [DISTWIDTH] IN BLOOD BY AUTOMATED COUNT: 13.6 % (ref 11–14.5)
GFR SERPLBLD CREATININE-BSD FMLA CKD-EPI: 58 MLS/MIN/1.73/M2
GLOBULIN SER-MCNC: 2.3 GM/DL (ref 2–3.9)
GLUCOSE SERPL-MCNC: 169 MG/DL (ref 70–115)
HCT VFR BLD AUTO: 38 % (ref 36–48)
HGB BLD-MCNC: 12.8 G/DL (ref 11.8–16)
IMM GRANULOCYTES # BLD AUTO: 0.2 X10(3)/MCL (ref 0–0.03)
IMM GRANULOCYTES NFR BLD AUTO: 2.2 % (ref 0–0.5)
LYMPHOCYTES # BLD AUTO: 3.3 X10(3)/MCL (ref 1.16–3.74)
LYMPHOCYTES NFR BLD AUTO: 36.7 % (ref 20–55)
MCH RBC QN AUTO: 29.4 PG (ref 27–34)
MCHC RBC AUTO-ENTMCNC: 33.7 G/DL (ref 31–37)
MCV RBC AUTO: 87.4 FL (ref 79–99)
MONOCYTES # BLD AUTO: 0.9 X10(3)/MCL (ref 0.24–0.36)
MONOCYTES NFR BLD AUTO: 10 % (ref 4.7–12.5)
NEUTROPHILS # BLD AUTO: 4.39 X10(3)/MCL (ref 1.56–6.13)
NEUTROPHILS NFR BLD AUTO: 49 % (ref 37–73)
OSMOLALITY SERPL: 291 MOSM/KG (ref 280–300)
PLATELET # BLD AUTO: 112 X10(3)/MCL (ref 140–371)
PMV BLD AUTO: 11 FL (ref 9.4–12.4)
POCT GLUCOSE: 159 MG/DL (ref 70–110)
POCT GLUCOSE: 190 MG/DL (ref 70–110)
POTASSIUM SERPL-SCNC: 4.8 MMOL/L (ref 3.5–5.1)
PROT SERPL-MCNC: 6.4 GM/DL (ref 6.3–8.2)
RBC # BLD AUTO: 4.35 X10(6)/MCL (ref 4–5.1)
SODIUM SERPL-SCNC: 139 MMOL/L (ref 135–145)
WBC # SPEC AUTO: 8.98 X10(3)/MCL (ref 4–11.5)

## 2023-10-22 PROCEDURE — 94761 N-INVAS EAR/PLS OXIMETRY MLT: CPT

## 2023-10-22 PROCEDURE — 25000003 PHARM REV CODE 250: Performed by: FAMILY MEDICINE

## 2023-10-22 PROCEDURE — 85025 COMPLETE CBC W/AUTO DIFF WBC: CPT | Performed by: FAMILY MEDICINE

## 2023-10-22 PROCEDURE — 25000242 PHARM REV CODE 250 ALT 637 W/ HCPCS: Performed by: INTERNAL MEDICINE

## 2023-10-22 PROCEDURE — 36415 COLL VENOUS BLD VENIPUNCTURE: CPT | Performed by: FAMILY MEDICINE

## 2023-10-22 PROCEDURE — 25000003 PHARM REV CODE 250: Performed by: INTERNAL MEDICINE

## 2023-10-22 PROCEDURE — 94640 AIRWAY INHALATION TREATMENT: CPT

## 2023-10-22 PROCEDURE — 25000003 PHARM REV CODE 250: Performed by: HOSPITALIST

## 2023-10-22 PROCEDURE — 99900035 HC TECH TIME PER 15 MIN (STAT)

## 2023-10-22 PROCEDURE — 80053 COMPREHEN METABOLIC PANEL: CPT | Performed by: HOSPITALIST

## 2023-10-22 PROCEDURE — G0378 HOSPITAL OBSERVATION PER HR: HCPCS

## 2023-10-22 PROCEDURE — 25000242 PHARM REV CODE 250 ALT 637 W/ HCPCS: Performed by: FAMILY MEDICINE

## 2023-10-22 RX ORDER — CIPROFLOXACIN 500 MG/1
500 TABLET ORAL EVERY 12 HOURS
Qty: 12 TABLET | Refills: 0 | Status: SHIPPED | OUTPATIENT
Start: 2023-10-22 | End: 2023-10-28

## 2023-10-22 RX ADMIN — BUSPIRONE HYDROCHLORIDE 7.5 MG: 7.5 TABLET ORAL at 08:10

## 2023-10-22 RX ADMIN — ALBUTEROL SULFATE 2.5 MG: 2.5 SOLUTION RESPIRATORY (INHALATION) at 07:10

## 2023-10-22 RX ADMIN — METOPROLOL TARTRATE 50 MG: 50 TABLET, FILM COATED ORAL at 08:10

## 2023-10-22 RX ADMIN — SUCRALFATE 1 G: 1 TABLET ORAL at 10:10

## 2023-10-22 RX ADMIN — SUCRALFATE 1 G: 1 TABLET ORAL at 06:10

## 2023-10-22 RX ADMIN — CITALOPRAM HYDROBROMIDE 20 MG: 20 TABLET ORAL at 08:10

## 2023-10-22 RX ADMIN — GABAPENTIN 300 MG: 300 CAPSULE ORAL at 08:10

## 2023-10-22 RX ADMIN — BUDESONIDE 0.25 MG: 0.25 INHALANT RESPIRATORY (INHALATION) at 07:10

## 2023-10-22 RX ADMIN — DULOXETINE HYDROCHLORIDE 20 MG: 20 CAPSULE, DELAYED RELEASE ORAL at 08:10

## 2023-10-22 RX ADMIN — EMPAGLIFLOZIN 10 MG: 10 TABLET, FILM COATED ORAL at 08:10

## 2023-10-22 RX ADMIN — ATORVASTATIN CALCIUM 20 MG: 20 TABLET, FILM COATED ORAL at 08:10

## 2023-10-22 RX ADMIN — POLYETHYLENE GLYCOL 3350 17 G: 17 POWDER, FOR SOLUTION ORAL at 08:10

## 2023-10-22 RX ADMIN — PANTOPRAZOLE SODIUM 40 MG: 40 TABLET, DELAYED RELEASE ORAL at 08:10

## 2023-10-22 RX ADMIN — DICLOFENAC SODIUM 2 G: 10 GEL TOPICAL at 08:10

## 2023-10-22 NOTE — PLAN OF CARE
Problem: Adult Inpatient Plan of Care  Goal: Plan of Care Review  Outcome: Ongoing, Progressing  Goal: Patient-Specific Goal (Individualized)  Outcome: Ongoing, Progressing  Goal: Absence of Hospital-Acquired Illness or Injury  Outcome: Ongoing, Progressing  Goal: Optimal Comfort and Wellbeing  Outcome: Ongoing, Progressing  Goal: Readiness for Transition of Care  Outcome: Ongoing, Progressing     Problem: Fall Injury Risk  Goal: Absence of Fall and Fall-Related Injury  Outcome: Ongoing, Progressing     Problem: UTI (Urinary Tract Infection)  Goal: Improved Infection Symptoms  Outcome: Ongoing, Progressing     Problem: Pain Acute  Goal: Acceptable Pain Control and Functional Ability  Outcome: Ongoing, Progressing

## 2023-10-22 NOTE — DISCHARGE SUMMARY
"[facility]-Med/Surg  Hospital Medicine  Discharge Summary      Patient Name: Adilia Gregg  MRN: [id number]  ELIZ: [id number]  Patient Class: OP- Observation  Admission Date: 10/20/2023  Hospital Length of Stay: 0 days  Discharge Date and Time:  10/22/2023 10:39 AM  Attending Physician: Kennedy Carbajal MD   Discharging Provider: Wolf Marion MD  Primary Care Provider: Amie, Primary Doctor    Primary Care Team: Networked reference to record PCT     HPI:   Pt is a 62 yo female with PMH of DM, Bipolar d/o, anxiety, ARVIN, CKD, COPD, HLD, OA, PAD, peripheral neuropathy due to DM, presented with nausea, "fuzzy feeling", and feeling unsteady. Pt reports that she has not been feeling well for several days.  Pt denies dysuria, fever, chills, sweats, abd pain, vomiting. Pt admits to chest tightness that has resolved. EKG no acute changes. Pt also denies sob, cough, congestion. Pt has baseline sob with exertion that is chronic and unchanged. Pt also denies edema, diarrhea. Pt admits to constipation.       * No surgery found *      Hospital Course:   10/22/2023 brief discharge summary 63-year-old female presented with confusion.  She was found to have hyponatremia and urinary tract infection.  Hospital course patient was admitted to the hospital started on IV antibiotics and given hydration which corrected her sodium.  After a couple of days she is doing well urine culture has returned sensitive to Cipro.  Her sodium is back to normal feel she is stable ready for discharge home.  She will be sent home on 6 day course of p.o. Cipro as well as her usual home medications.  She will follow up with the kathryn Wolfe in 1 week.       Goals of Care Treatment Preferences:  Code Status: Full Code      Consults:   Consults (From admission, onward)        Status Ordering Provider     Inpatient virtual consult to Hospital Medicine  Once        Provider:  (Not yet assigned)    Acknowledged NIKOLAY ABERNATHY new " Assessment & Plan notes have been filed under this hospital service since the last note was generated.  Service: Hospital Medicine    Final Active Diagnoses:    Diagnosis Date Noted POA    PRINCIPAL PROBLEM:  Hyponatremia [E87.1] 10/20/2023 Unknown    UTI (urinary tract infection) [N39.0] 10/20/2023 Unknown    Constipation [K59.00] 10/20/2023 Unknown    Tobacco abuse [Z72.0] 10/20/2023 Unknown    Chest pain [R07.9] 10/20/2023 Unknown      Problems Resolved During this Admission:       Discharged Condition: good    Disposition: Home or Self Care    Follow Up:   Follow-up Information     No, Primary Doctor .           Jessie Srivastava NP Follow up in 1 week(s).    Specialty: Family Medicine  Contact information:  [address] [place] [id number] [phone]                       Patient Instructions:   No discharge procedures on file.    Significant Diagnostic Studies:     Pending Diagnostic Studies:     None         Medications:  Reconciled Home Medications:      Medication List      START taking these medications    ciprofloxacin HCl 500 MG tablet  Commonly known as: CIPRO  Take 1 tablet (500 mg total) by mouth every 12 (twelve) hours. for 6 days        CONTINUE taking these medications    albuterol 90 mcg/actuation inhaler  Commonly known as: PROVENTIL/VENTOLIN HFA  SMARTSI Puff(s) Via Inhaler 4 Times Daily PRN     ALLERGY RELIEF (LORATADINE) 10 mg tablet  Generic drug: loratadine  Take 10 mg by mouth.     aspirin 81 MG EC tablet  Commonly known as: ECOTRIN  Take 81 mg by mouth once daily.     busPIRone 15 MG tablet  Commonly known as: BUSPAR  Take 7.5 mg by mouth 2 (two) times daily.     citalopram 20 MG tablet  Commonly known as: CeleXA  Take 20 mg by mouth.     * diclofenac sodium 1 % Gel  Commonly known as: VOLTAREN  Apply 1 g topically.     * diclofenac 50 MG EC tablet  Commonly known as: VOLTAREN  Take 50 mg by mouth 2 (two) times daily.     docusate sodium 100 MG capsule  Commonly known as:  COLACE  Take 100 mg by mouth 2 (two) times daily as needed for Constipation.     DULoxetine 20 MG capsule  Commonly known as: CYMBALTA  Take 20 mg by mouth once daily.     fenofibrate 145 MG tablet  Commonly known as: TRICOR  Take 145 mg by mouth.     furosemide 20 MG tablet  Commonly known as: LASIX  Take 20 mg by mouth once daily.     gabapentin 300 MG capsule  Commonly known as: NEURONTIN  Take 300 mg by mouth 2 (two) times daily.     INVOKANA 100 mg Tab tablet  Generic drug: canagliflozin  TAKE 1 TABLET DAILY FOR DIABETES     isosorbide mononitrate 30 MG 24 hr tablet  Commonly known as: IMDUR  Take 30 mg by mouth once daily.     JANUVIA 100 MG Tab  Generic drug: SITagliptin phosphate  TAKE 1 TABLET DAILY FOR DIABETES     JARDIANCE 10 mg tablet  Generic drug: empagliflozin  Take 10 mg by mouth once daily.     LaMICtal 25 MG tablet  Generic drug: lamoTRIgine  SMARTSI Tablet(s) By Mouth Every Evening     lisinopriL-hydrochlorothiazide 20-12.5 mg per tablet  Commonly known as: PRINZIDE,ZESTORETIC  Take 1 tablet by mouth once daily. FOR BLOOD PRESSURE     melatonin 12 mg Tab  Take 1 tablet by mouth every evening.     metFORMIN 1000 MG tablet  Commonly known as: GLUCOPHAGE  Take 1,000 mg by mouth every evening.     metoprolol tartrate 50 MG tablet  Commonly known as: LOPRESSOR  Take 50 mg by mouth 2 (two) times daily.     nicotine 14 mg/24 hr  Commonly known as: NICODERM CQ  Apply topically.     nitroGLYCERIN 0.4 MG SL tablet  Commonly known as: NITROSTAT  Place 0.4 mg under the tongue.     OLANZapine 20 MG tablet  Commonly known as: ZyPREXA  Take 20 mg by mouth every evening.     pantoprazole 40 MG tablet  Commonly known as: PROTONIX  Take 40 mg by mouth.     pioglitazone 15 MG tablet  Commonly known as: ACTOS  Take 15 mg by mouth every morning.     ranolazine 500 MG Tb12  Commonly known as: RANEXA  Take 500 mg by mouth 2 (two) times daily.     rosuvastatin 10 MG tablet  Commonly known as: CRESTOR  Take 40 mg by  mouth once daily.     spironolactone 25 MG tablet  Commonly known as: ALDACTONE  Take 25 mg by mouth every morning.     sucralfate 1 gram tablet  Commonly known as: CARAFATE  Take 1 g by mouth 3 (three) times daily.     SYMBICORT 80-4.5 mcg/actuation Hfaa  Generic drug: budesonide-formoterol 80-4.5 mcg  Inhale 1 puff into the lungs 2 (two) times daily.     TRUE METRIX GLUCOSE TEST STRIP Strp  Generic drug: blood sugar diagnostic  SMARTSIG:Via Meter Daily         * This list has 2 medication(s) that are the same as other medications prescribed for you. Read the directions carefully, and ask your doctor or other care provider to review them with you.                Indwelling Lines/Drains at time of discharge:   Lines/Drains/Airways     None                 Time spent on the discharge of patient: 36 minutes         Wolf Marion MD  Department of Hospital Medicine  [facility]/Surg

## 2023-10-22 NOTE — HOSPITAL COURSE
10/22/2023 brief discharge summary 63-year-old female presented with confusion.  She was found to have hyponatremia and urinary tract infection.  Hospital course patient was admitted to the hospital started on IV antibiotics and given hydration which corrected her sodium.  After a couple of days she is doing well urine culture has returned sensitive to Cipro.  Her sodium is back to normal feel she is stable ready for discharge home.  She will be sent home on 6 day course of p.o. Cipro as well as her usual home medications.  She will follow up with the kathryn Wolfe in 1 week.

## 2023-10-22 NOTE — NURSING
Discharge instructions reviewed with patient. No distress noted. Ambulating in room packing belongings.

## 2023-10-22 NOTE — PLAN OF CARE
Problem: Adult Inpatient Plan of Care  Goal: Plan of Care Review  10/22/2023 1039 by Susan Jennings RN  Outcome: Met  10/22/2023 1039 by Susan Jennings RN  Outcome: Adequate for Care Transition  10/22/2023 0942 by Susan Jennings RN  Outcome: Ongoing, Progressing  Goal: Patient-Specific Goal (Individualized)  10/22/2023 1039 by Susan Jennings RN  Outcome: Met  10/22/2023 1039 by Susan Jennings RN  Outcome: Adequate for Care Transition  10/22/2023 0942 by Susan Jennings RN  Outcome: Ongoing, Progressing  Goal: Absence of Hospital-Acquired Illness or Injury  10/22/2023 1039 by Susan Jennings RN  Outcome: Met  10/22/2023 1039 by Susan Jennings RN  Outcome: Adequate for Care Transition  10/22/2023 0942 by Susan Jennings RN  Outcome: Ongoing, Progressing  Goal: Optimal Comfort and Wellbeing  10/22/2023 1039 by Susan Jennings RN  Outcome: Met  10/22/2023 1039 by Susan Jennings RN  Outcome: Adequate for Care Transition  10/22/2023 0942 by Susan Jennings RN  Outcome: Ongoing, Progressing  Goal: Readiness for Transition of Care  10/22/2023 1039 by Susan Jennings RN  Outcome: Met  10/22/2023 1039 by Susan Jennings RN  Outcome: Adequate for Care Transition  10/22/2023 0942 by Susan Jennings RN  Outcome: Ongoing, Progressing     Problem: Fall Injury Risk  Goal: Absence of Fall and Fall-Related Injury  10/22/2023 1039 by Susan Jennings RN  Outcome: Met  10/22/2023 1039 by Susan Jennings RN  Outcome: Adequate for Care Transition  10/22/2023 0942 by Susan Jennings RN  Outcome: Ongoing, Progressing     Problem: UTI (Urinary Tract Infection)  Goal: Improved Infection Symptoms  10/22/2023 1039 by Susan Jennings RN  Outcome: Met  10/22/2023 1039 by Susan Jennings RN  Outcome: Adequate for Care Transition  10/22/2023 0942 by Susan Jennings RN  Outcome: Ongoing, Progressing     Problem: Pain Acute  Goal: Acceptable Pain Control and Functional Ability  10/22/2023 1039 by Susan Jennings, RN  Outcome: Met  10/22/2023  1039 by Susan Jennings, RN  Outcome: Adequate for Care Transition  10/22/2023 0942 by Susan Jennings, RN  Outcome: Ongoing, Progressing

## 2023-10-25 LAB
BACTERIA BLD CULT: NORMAL
BACTERIA BLD CULT: NORMAL

## 2023-10-27 ENCOUNTER — LAB VISIT (OUTPATIENT)
Dept: LAB | Facility: HOSPITAL | Age: 63
End: 2023-10-27
Attending: INTERNAL MEDICINE
Payer: MEDICAID

## 2023-10-27 DIAGNOSIS — E11.9 DIABETES MELLITUS WITHOUT COMPLICATION: ICD-10-CM

## 2023-10-27 DIAGNOSIS — N18.9 CKD (CHRONIC KIDNEY DISEASE): ICD-10-CM

## 2023-10-27 DIAGNOSIS — J44.9 CHRONIC OBSTRUCTIVE PULMONARY DISEASE, UNSPECIFIED COPD TYPE: ICD-10-CM

## 2023-10-27 DIAGNOSIS — F41.9 ANXIETY: ICD-10-CM

## 2023-10-27 DIAGNOSIS — I10 HYPERTENSION, UNSPECIFIED TYPE: Primary | ICD-10-CM

## 2023-10-27 LAB
ALBUMIN SERPL-MCNC: 4.9 G/DL (ref 3.4–5)
APPEARANCE UR: CLEAR
BASOPHILS # BLD AUTO: 0.11 X10(3)/MCL (ref 0.01–0.08)
BASOPHILS NFR BLD AUTO: 0.9 % (ref 0.1–1.2)
BILIRUB UR QL STRIP.AUTO: NEGATIVE
BUN SERPL-MCNC: 32 MG/DL (ref 7–20)
CALCIUM SERPL-MCNC: 9.6 MG/DL (ref 8.4–10.2)
CALCIUM SERPL-MCNC: 9.9 MG/DL (ref 8.4–10.2)
CHLORIDE SERPL-SCNC: 95 MMOL/L (ref 98–110)
CO2 SERPL-SCNC: 24 MMOL/L (ref 21–32)
COLOR UR AUTO: YELLOW
CREAT SERPL-MCNC: 1.36 MG/DL (ref 0.66–1.25)
CREAT UR-MCNC: 15.6 MG/DL
CREAT UR-MCNC: 15.6 MG/DL (ref 45–106)
CREAT/UREA NIT SERPL: 24 (ref 12–20)
EOSINOPHIL # BLD AUTO: 0.21 X10(3)/MCL (ref 0.04–0.36)
EOSINOPHIL NFR BLD AUTO: 1.8 % (ref 0.7–7)
ERYTHROCYTE [DISTWIDTH] IN BLOOD BY AUTOMATED COUNT: 13.9 % (ref 11–14.5)
GFR SERPLBLD CREATININE-BSD FMLA CKD-EPI: 44 MLS/MIN/1.73/M2
GLUCOSE SERPL-MCNC: 135 MG/DL (ref 70–115)
GLUCOSE UR QL STRIP.AUTO: >=1000
HCT VFR BLD AUTO: 38.4 % (ref 36–48)
HGB BLD-MCNC: 13.1 G/DL (ref 11.8–16)
IMM GRANULOCYTES # BLD AUTO: 0.42 X10(3)/MCL (ref 0–0.03)
IMM GRANULOCYTES NFR BLD AUTO: 3.5 % (ref 0–0.5)
KETONES UR QL STRIP.AUTO: NEGATIVE
LEUKOCYTE ESTERASE UR QL STRIP.AUTO: NEGATIVE
LYMPHOCYTES # BLD AUTO: 2.79 X10(3)/MCL (ref 1.16–3.74)
LYMPHOCYTES NFR BLD AUTO: 23.4 % (ref 20–55)
MCH RBC QN AUTO: 30 PG (ref 27–34)
MCHC RBC AUTO-ENTMCNC: 34.1 G/DL (ref 31–37)
MCV RBC AUTO: 88.1 FL (ref 79–99)
MICROALBUMIN UR-MCNC: <5 UG/ML
MICROALBUMIN/CREAT RATIO PNL UR: ABNORMAL
MONOCYTES # BLD AUTO: 1.13 X10(3)/MCL (ref 0.24–0.36)
MONOCYTES NFR BLD AUTO: 9.5 % (ref 4.7–12.5)
NEUTROPHILS # BLD AUTO: 7.27 X10(3)/MCL (ref 1.56–6.13)
NEUTROPHILS NFR BLD AUTO: 60.9 % (ref 37–73)
NITRITE UR QL STRIP.AUTO: NEGATIVE
NRBC BLD AUTO-RTO: 0 %
PH UR STRIP.AUTO: 6 [PH]
PHOSPHATE SERPL-MCNC: 4.4 MG/DL (ref 2.5–4.9)
PLATELET # BLD AUTO: 121 X10(3)/MCL (ref 140–371)
PMV BLD AUTO: 11.1 FL (ref 9.4–12.4)
POTASSIUM SERPL-SCNC: 3.9 MMOL/L (ref 3.5–5.1)
PROT UR QL STRIP.AUTO: NEGATIVE
PROT UR STRIP-MCNC: 10 MG/DL
PTH-INTACT SERPL-MCNC: 69.8 PG/ML (ref 14–73)
RBC # BLD AUTO: 4.36 X10(6)/MCL (ref 4–5.1)
RBC UR QL AUTO: NEGATIVE
SODIUM SERPL-SCNC: 132 MMOL/L (ref 135–145)
SP GR UR STRIP.AUTO: 1.01 (ref 1–1.03)
UROBILINOGEN UR STRIP-ACNC: 0.2
WBC # SPEC AUTO: 11.93 X10(3)/MCL (ref 4–11.5)

## 2023-10-27 PROCEDURE — 82043 UR ALBUMIN QUANTITATIVE: CPT

## 2023-10-27 PROCEDURE — 82570 ASSAY OF URINE CREATININE: CPT

## 2023-10-27 PROCEDURE — 36415 COLL VENOUS BLD VENIPUNCTURE: CPT

## 2023-10-27 PROCEDURE — 81003 URINALYSIS AUTO W/O SCOPE: CPT

## 2023-10-27 PROCEDURE — 84156 ASSAY OF PROTEIN URINE: CPT

## 2023-10-27 PROCEDURE — 83970 ASSAY OF PARATHORMONE: CPT

## 2023-10-27 PROCEDURE — 80069 RENAL FUNCTION PANEL: CPT

## 2023-10-27 PROCEDURE — 85025 COMPLETE CBC W/AUTO DIFF WBC: CPT

## 2023-11-11 ENCOUNTER — HOSPITAL ENCOUNTER (EMERGENCY)
Facility: HOSPITAL | Age: 63
Discharge: HOME | End: 2023-11-11
Attending: OBSTETRICS & GYNECOLOGY
Payer: MEDICAID

## 2023-11-11 VITALS
WEIGHT: 178 LBS | DIASTOLIC BLOOD PRESSURE: 65 MMHG | RESPIRATION RATE: 18 BRPM | TEMPERATURE: 98 F | SYSTOLIC BLOOD PRESSURE: 123 MMHG | HEIGHT: 60 IN | BODY MASS INDEX: 34.95 KG/M2 | HEART RATE: 66 BPM | OXYGEN SATURATION: 97 %

## 2023-11-11 DIAGNOSIS — R42 DIZZINESS: ICD-10-CM

## 2023-11-11 LAB
ALBUMIN SERPL-MCNC: 4.9 G/DL (ref 3.4–5)
ALBUMIN/GLOB SERPL: 1.8 RATIO
ALP SERPL-CCNC: 62 UNIT/L (ref 50–144)
ALT SERPL-CCNC: 27 UNIT/L (ref 1–45)
ANION GAP SERPL CALC-SCNC: 12 MEQ/L (ref 2–13)
APPEARANCE UR: CLEAR
AST SERPL-CCNC: 25 UNIT/L (ref 14–36)
BASOPHILS # BLD AUTO: 0.11 X10(3)/MCL (ref 0.01–0.08)
BASOPHILS NFR BLD AUTO: 0.7 % (ref 0.1–1.2)
BILIRUB SERPL-MCNC: 0.3 MG/DL (ref 0–1)
BILIRUB UR QL STRIP.AUTO: NEGATIVE
BUN SERPL-MCNC: 20 MG/DL (ref 7–20)
CALCIUM SERPL-MCNC: 9.8 MG/DL (ref 8.4–10.2)
CHLORIDE SERPL-SCNC: 92 MMOL/L (ref 98–110)
CK MB SERPL-MCNC: 0.83 NG/ML (ref 0–3.38)
CK SERPL-CCNC: 74 U/L (ref 30–135)
CO2 SERPL-SCNC: 28 MMOL/L (ref 21–32)
COLOR UR AUTO: YELLOW
CREAT SERPL-MCNC: 1.27 MG/DL (ref 0.66–1.25)
CREAT/UREA NIT SERPL: 16 (ref 12–20)
EOSINOPHIL # BLD AUTO: 0.14 X10(3)/MCL (ref 0.04–0.36)
EOSINOPHIL NFR BLD AUTO: 0.9 % (ref 0.7–7)
ERYTHROCYTE [DISTWIDTH] IN BLOOD BY AUTOMATED COUNT: 13.4 % (ref 11–14.5)
GFR SERPLBLD CREATININE-BSD FMLA CKD-EPI: 48 MLS/MIN/1.73/M2
GLOBULIN SER-MCNC: 2.7 GM/DL (ref 2–3.9)
GLUCOSE SERPL-MCNC: 169 MG/DL (ref 70–115)
GLUCOSE UR QL STRIP.AUTO: >=1000
HCT VFR BLD AUTO: 40.2 % (ref 36–48)
HGB BLD-MCNC: 13.8 G/DL (ref 11.8–16)
IMM GRANULOCYTES # BLD AUTO: 0.56 X10(3)/MCL (ref 0–0.03)
IMM GRANULOCYTES NFR BLD AUTO: 3.7 % (ref 0–0.5)
KETONES UR QL STRIP.AUTO: NEGATIVE
LEUKOCYTE ESTERASE UR QL STRIP.AUTO: NEGATIVE
LYMPHOCYTES # BLD AUTO: 2.31 X10(3)/MCL (ref 1.16–3.74)
LYMPHOCYTES NFR BLD AUTO: 15.3 % (ref 20–55)
MCH RBC QN AUTO: 30.4 PG (ref 27–34)
MCHC RBC AUTO-ENTMCNC: 34.3 G/DL (ref 31–37)
MCV RBC AUTO: 88.5 FL (ref 79–99)
MONOCYTES # BLD AUTO: 1.12 X10(3)/MCL (ref 0.24–0.36)
MONOCYTES NFR BLD AUTO: 7.4 % (ref 4.7–12.5)
NEUTROPHILS # BLD AUTO: 10.81 X10(3)/MCL (ref 1.56–6.13)
NEUTROPHILS NFR BLD AUTO: 72 % (ref 37–73)
NITRITE UR QL STRIP.AUTO: NEGATIVE
NRBC BLD AUTO-RTO: 0 %
PH UR STRIP.AUTO: 5.5 [PH]
PLATELET # BLD AUTO: 101 X10(3)/MCL (ref 140–371)
PMV BLD AUTO: 12.2 FL (ref 9.4–12.4)
POTASSIUM SERPL-SCNC: 4.6 MMOL/L (ref 3.5–5.1)
PROT SERPL-MCNC: 7.6 GM/DL (ref 6.3–8.2)
PROT UR QL STRIP.AUTO: NEGATIVE
RBC # BLD AUTO: 4.54 X10(6)/MCL (ref 4–5.1)
RBC UR QL AUTO: NEGATIVE
SODIUM SERPL-SCNC: 132 MMOL/L (ref 135–145)
SP GR UR STRIP.AUTO: 1.01 (ref 1–1.03)
TROPONIN I SERPL-MCNC: <0.012 NG/ML (ref 0–0.03)
UROBILINOGEN UR STRIP-ACNC: 0.2
WBC # SPEC AUTO: 15.05 X10(3)/MCL (ref 4–11.5)

## 2023-11-11 PROCEDURE — 93005 ELECTROCARDIOGRAM TRACING: CPT

## 2023-11-11 PROCEDURE — 96360 HYDRATION IV INFUSION INIT: CPT

## 2023-11-11 PROCEDURE — 25000003 PHARM REV CODE 250: Performed by: OBSTETRICS & GYNECOLOGY

## 2023-11-11 PROCEDURE — 99284 EMERGENCY DEPT VISIT MOD MDM: CPT | Mod: 25

## 2023-11-11 PROCEDURE — 82550 ASSAY OF CK (CPK): CPT | Performed by: OBSTETRICS & GYNECOLOGY

## 2023-11-11 PROCEDURE — 85025 COMPLETE CBC W/AUTO DIFF WBC: CPT | Performed by: OBSTETRICS & GYNECOLOGY

## 2023-11-11 PROCEDURE — 80053 COMPREHEN METABOLIC PANEL: CPT | Performed by: OBSTETRICS & GYNECOLOGY

## 2023-11-11 PROCEDURE — 81003 URINALYSIS AUTO W/O SCOPE: CPT | Performed by: OBSTETRICS & GYNECOLOGY

## 2023-11-11 PROCEDURE — 84484 ASSAY OF TROPONIN QUANT: CPT | Performed by: OBSTETRICS & GYNECOLOGY

## 2023-11-11 PROCEDURE — 93010 ELECTROCARDIOGRAM REPORT: CPT | Mod: ,,, | Performed by: INTERNAL MEDICINE

## 2023-11-11 PROCEDURE — 82553 CREATINE MB FRACTION: CPT | Performed by: OBSTETRICS & GYNECOLOGY

## 2023-11-11 PROCEDURE — 93010 EKG 12-LEAD: ICD-10-PCS | Mod: ,,, | Performed by: INTERNAL MEDICINE

## 2023-11-11 RX ORDER — SODIUM CHLORIDE 9 MG/ML
1000 INJECTION, SOLUTION INTRAVENOUS
Status: COMPLETED | OUTPATIENT
Start: 2023-11-11 | End: 2023-11-11

## 2023-11-11 RX ADMIN — SODIUM CHLORIDE 1000 ML: 9 INJECTION, SOLUTION INTRAVENOUS at 03:11

## 2023-11-11 NOTE — ED PROVIDER NOTES
"Encounter Date: 11/11/2023       History     Chief Complaint   Patient presents with    Dysuria     Pt reports onset of dizziness, nausea, and dysuria since this AM. Pt denies episodes of vomiting. Pt also reports "I feel like I've had indigestion and the rolaids didn't help." Pt reports last time this happened she was hospitalized w/ low sodium and UTI.     Nausea    Dizziness    Abdominal Pain     63-year-old female complains of weakness dysuria and dizziness patient denies any vomiting denies any chest pain or shortness of breath patient says that she has a history of hypertension hyperlipidemia dizziness chronic kidney disease and hyponatremia in the past came into the ER for evaluation    The history is provided by the patient. No  was used.     Review of patient's allergies indicates:   Allergen Reactions    Pcn [penicillins] Hives    Wool Itching    Shrimp Hives and Itching    Fluoxetine Hallucinations    Omeprazole Palpitations     Past Medical History:   Diagnosis Date    Anxiety and depression     Bipolar disorder, unspecified     Chest pain     per CARDs: CORONARY MICROVASCULAR DYSFUNCTION    Chronic venous insufficiency     CKD (chronic kidney disease)     COPD (chronic obstructive pulmonary disease)     Diabetes mellitus, type 2     Diabetic peripheral neuropathy associated with type 2 diabetes mellitus     HLD (hyperlipidemia)     Hypertension     ARVIN (obstructive sleep apnea)     Osteoarthritis     PAD (peripheral artery disease)     w/NEUROGENIC CLAUDICATION    Personal history of colonic polyps     Joe Barnes MD     Past Surgical History:   Procedure Laterality Date    APPENDECTOMY      BILATERAL SALPINGO-OOPHORECTOMY (BSO)  2001    BREAST BIOPSY Right 2000    Benign    COLONOSCOPY  03/29/2018    EXCISION-ADENOMA  03/29/2018    OPEN REDUCTION AND INTERNAL FIXATION (ORIF) OF FRACTURE OF DISTAL RADIUS Left 01/26/2023    Procedure: ORIF, FRACTURE, RADIUS, DISTAL;  Surgeon: Manny NEGRETE" MD [place];  Location: [facility];  Service: Orthopedics;  Laterality: Left;  supine, regular or bed with hand table, tourniquet, c-arm    TOTAL ABDOMINAL HYSTERECTOMY  12/29/1983     Family History   Problem Relation Age of Onset    Breast cancer Mother     Breast cancer Paternal Aunt     Breast cancer Paternal Grandmother     Ovarian cancer Neg Hx     Uterine cancer Neg Hx     Colon cancer Neg Hx     Cervical cancer Neg Hx      Social History     Tobacco Use    Smoking status: Every Day     Current packs/day: 1.00     Average packs/day: 1 pack/day for 47.0 years (47.0 ttl pk-yrs)     Types: Cigarettes    Smokeless tobacco: Never   Substance Use Topics    Alcohol use: Never    Drug use: Never     Types: Marijuana     Comment: 40 years ago     Review of Systems   Genitourinary:  Positive for dysuria.   Neurological:  Positive for weakness.   All other systems reviewed and are negative.      Physical Exam     Initial Vitals [11/11/23 1426]   BP Pulse Resp Temp SpO2   (!) 154/85 76 18 98 °F (36.7 °C) 98 %      MAP       --         Physical Exam    Nursing note and vitals reviewed.  Constitutional: She appears well-developed and well-nourished.   HENT:   Head: Normocephalic and atraumatic.   Eyes: Pupils are equal, round, and reactive to light.   Neck:   Normal range of motion.  Cardiovascular:  Normal rate.           Pulmonary/Chest: Breath sounds normal.   Abdominal: Abdomen is soft.   Musculoskeletal:         General: Normal range of motion.      Cervical back: Normal range of motion.     Neurological: She is alert and oriented to person, place, and time.   Dizziness   Skin: Skin is warm and dry.   Psychiatric: She has a normal mood and affect.         ED Course   Procedures  Labs Reviewed   COMPREHENSIVE METABOLIC PANEL - Abnormal; Notable for the following components:       Result Value    Sodium Level 132 (*)     Chloride 92 (*)     Glucose Level 169 (*)     Creatinine 1.27 (*)     All other components within  normal limits   URINALYSIS - Abnormal; Notable for the following components:    Glucose, UA >=1000 (*)     All other components within normal limits    Narrative:      URINE STABILITY IS 2 HOURS AT ROOM TEMP OR    SIX HOURS REFRIGERATED. PERFORMING TESTING ON    SPECIMENS GREATER THAN THIS AGE MAY AFFECT THE    FOLLOWING TESTS:    PH          SPECIFIC GRAVITY           BLOOD    CLARITY     BILIRUBIN               UROBILINOGEN   CBC WITH DIFFERENTIAL - Abnormal; Notable for the following components:    WBC 15.05 (*)     Platelet 101 (*)     Lymph % 15.3 (*)     Neut # 10.81 (*)     Mono # 1.12 (*)     Baso # 0.11 (*)     IG# 0.56 (*)     IG% 3.7 (*)     All other components within normal limits   TROPONIN I - Normal   CK - Normal   CK-MB - Normal   CBC W/ AUTO DIFFERENTIAL    Narrative:     The following orders were created for panel order CBC auto differential.  Procedure                               Abnormality         Status                     ---------                               -----------         ------                     CBC with Differential[[id number]]       Abnormal            Final result                 Please view results for these tests on the individual orders.     EKG Readings: (Independently Interpreted)   Ekg 65 bpm nsr no acute st t changes seen        Imaging Results    None          Medications   0.9%  NaCl infusion (has no administration in time range)     Medical Decision Making  63-year-old female complaining of dysuria and dizziness and weakness came into the ER for evaluation patient was admitted with the same complaints before in the [facility] about 3 weeks back was diagnosed with hyponatremia and urinary tract infection patient says that she feels in the same way today discussed with the patient we will get the basic labs      Differential diagnosis  Dizziness  UTI  Hyponatremia  Generally ill feeling        Labs reviewed with the patient discussed with the patient sodium is  132 patient feeling better there is no urinary tract infection discussed with the patient we will give some IV fluids vital signs are stable advised to keep hydrated at home and follow up with primary care doctor in 2-3 days patient was discharged in stable condition                                 Clinical Impression:   Final diagnoses:  [R42] Dizziness        ED Disposition Condition    Discharge Stable          ED Prescriptions    None       Follow-up Information       Follow up With Specialties Details Why Contact Info    Jessie Srivastava, NP Family Medicine Schedule an appointment as soon as possible for a visit in 2 days  [address] [place] [id number]  [phone]               Xavier Sigala MD  11/11/23 2384

## 2023-11-11 NOTE — Clinical Note
"Adilia Mainrock Gregg was seen and treated in our emergency department on 11/11/2023.  She may return to work on 11/15/2023.       If you have any questions or concerns, please don't hesitate to call.      Xavier Sigala MD"

## 2023-11-14 ENCOUNTER — OFFICE VISIT (OUTPATIENT)
Dept: ORTHOPEDICS | Facility: CLINIC | Age: 63
End: 2023-11-14
Payer: MEDICAID

## 2023-11-14 VITALS
DIASTOLIC BLOOD PRESSURE: 73 MMHG | SYSTOLIC BLOOD PRESSURE: 112 MMHG | HEART RATE: 69 BPM | HEIGHT: 60 IN | BODY MASS INDEX: 34.87 KG/M2 | WEIGHT: 177.63 LBS

## 2023-11-14 DIAGNOSIS — M25.511 CHRONIC RIGHT SHOULDER PAIN: ICD-10-CM

## 2023-11-14 DIAGNOSIS — G89.29 CHRONIC RIGHT SHOULDER PAIN: ICD-10-CM

## 2023-11-14 DIAGNOSIS — M75.101 ROTATOR CUFF SYNDROME, RIGHT: Primary | ICD-10-CM

## 2023-11-14 PROCEDURE — 3008F BODY MASS INDEX DOCD: CPT | Mod: CPTII,,, | Performed by: NURSE PRACTITIONER

## 2023-11-14 PROCEDURE — 4010F ACE/ARB THERAPY RXD/TAKEN: CPT | Mod: CPTII,,, | Performed by: NURSE PRACTITIONER

## 2023-11-14 PROCEDURE — 3078F PR MOST RECENT DIASTOLIC BLOOD PRESSURE < 80 MM HG: ICD-10-PCS | Mod: CPTII,,, | Performed by: NURSE PRACTITIONER

## 2023-11-14 PROCEDURE — 3078F DIAST BP <80 MM HG: CPT | Mod: CPTII,,, | Performed by: NURSE PRACTITIONER

## 2023-11-14 PROCEDURE — 3008F PR BODY MASS INDEX (BMI) DOCUMENTED: ICD-10-PCS | Mod: CPTII,,, | Performed by: NURSE PRACTITIONER

## 2023-11-14 PROCEDURE — 3044F HG A1C LEVEL LT 7.0%: CPT | Mod: CPTII,,, | Performed by: NURSE PRACTITIONER

## 2023-11-14 PROCEDURE — 1159F MED LIST DOCD IN RCRD: CPT | Mod: CPTII,,, | Performed by: NURSE PRACTITIONER

## 2023-11-14 PROCEDURE — 99215 PR OFFICE/OUTPT VISIT, EST, LEVL V, 40-54 MIN: ICD-10-PCS | Mod: S$PBB,,, | Performed by: NURSE PRACTITIONER

## 2023-11-14 PROCEDURE — 3074F PR MOST RECENT SYSTOLIC BLOOD PRESSURE < 130 MM HG: ICD-10-PCS | Mod: CPTII,,, | Performed by: NURSE PRACTITIONER

## 2023-11-14 PROCEDURE — 3074F SYST BP LT 130 MM HG: CPT | Mod: CPTII,,, | Performed by: NURSE PRACTITIONER

## 2023-11-14 PROCEDURE — 1160F RVW MEDS BY RX/DR IN RCRD: CPT | Mod: CPTII,,, | Performed by: NURSE PRACTITIONER

## 2023-11-14 PROCEDURE — 99215 OFFICE O/P EST HI 40 MIN: CPT | Mod: S$PBB,,, | Performed by: NURSE PRACTITIONER

## 2023-11-14 PROCEDURE — 1159F PR MEDICATION LIST DOCUMENTED IN MEDICAL RECORD: ICD-10-PCS | Mod: CPTII,,, | Performed by: NURSE PRACTITIONER

## 2023-11-14 PROCEDURE — 99215 OFFICE O/P EST HI 40 MIN: CPT | Mod: PBBFAC | Performed by: NURSE PRACTITIONER

## 2023-11-14 PROCEDURE — 4010F PR ACE/ARB THEARPY RXD/TAKEN: ICD-10-PCS | Mod: CPTII,,, | Performed by: NURSE PRACTITIONER

## 2023-11-14 PROCEDURE — 1160F PR REVIEW ALL MEDS BY PRESCRIBER/CLIN PHARMACIST DOCUMENTED: ICD-10-PCS | Mod: CPTII,,, | Performed by: NURSE PRACTITIONER

## 2023-11-14 PROCEDURE — 3044F PR MOST RECENT HEMOGLOBIN A1C LEVEL <7.0%: ICD-10-PCS | Mod: CPTII,,, | Performed by: NURSE PRACTITIONER

## 2023-11-14 NOTE — PROGRESS NOTES
Subjective:   PATIENT ID: Adilia Gregg is a 63 y.o. female. Smoker. Employment HX: CNA, currently disability.    Seen [facility] for same DX since n/a.  Previously seen by ortho res. For left wrist fracture w/ ORIF 2023.  CHIEF COMPLAINT: Shoulder Pain of the Right Shoulder (Referred for Rt Shoulder pain. Pt had an Initial injury in . Pt had a re injury 3 months ago . Pt. Completed PT and started doing home exercises. /)    HPI:    Right aching  global  shoulder pain. Right dominate  Injury: no history of significant injuries or previous surgeries  Onset: several years ago fluctuates   Modifying Factors: increased with overhead movement, worse with activity, increased when lying on affected shoulder, and increased pain at PM  Associated Symptoms: popping, decreased ROM, and difficulty sleeping s/t pain  Activity: sedentary with light activity and pain moderately interferes with ADLs .   Previous Treatments: [place]  [place] [facility] [place] , HEP with TheraBand, RX NSAIDs, and RX PT completed 12 wks/ 3 xs per week d/c'd   without adequate relief at this time.   PMH: + kidney impairment and HX stroke  Family History: + OA    NOTE: New patient referred for right shoulder pain with completed conservative treatments.  Symptoms affecting ADLs.     Current Outpatient Medications:     albuterol (PROVENTIL/VENTOLIN HFA) 90 mcg/actuation inhaler, SMARTSI Puff(s) Via Inhaler 4 Times Daily PRN, Disp: , Rfl:     ALLERGY RELIEF, LORATADINE, 10 mg tablet, Take 10 mg by mouth., Disp: , Rfl:     aspirin (ECOTRIN) 81 MG EC tablet, Take 81 mg by mouth once daily., Disp: , Rfl:     busPIRone (BUSPAR) 15 MG tablet, Take 7.5 mg by mouth 2 (two) times daily., Disp: , Rfl:     citalopram (CELEXA) 20 MG tablet, Take 20 mg by mouth., Disp: , Rfl:     diclofenac (VOLTAREN) 50 MG EC tablet, Take 50 mg by mouth 2 (two) times daily., Disp: , Rfl:     diclofenac sodium (VOLTAREN) 1 % Gel, Apply 1 g  topically., Disp: , Rfl:     docusate sodium (COLACE) 100 MG capsule, Take 100 mg by mouth 2 (two) times daily as needed for Constipation., Disp: , Rfl:     DULoxetine (CYMBALTA) 20 MG capsule, Take 20 mg by mouth once daily., Disp: , Rfl:     empagliflozin (JARDIANCE) 10 mg tablet, Take 10 mg by mouth once daily., Disp: , Rfl:     fenofibrate (TRICOR) 145 MG tablet, Take 145 mg by mouth., Disp: , Rfl:     furosemide (LASIX) 20 MG tablet, Take 20 mg by mouth once daily., Disp: , Rfl:     gabapentin (NEURONTIN) 300 MG capsule, Take 300 mg by mouth 2 (two) times daily., Disp: , Rfl:     INVOKANA 100 mg Tab tablet, TAKE 1 TABLET DAILY FOR DIABETES, Disp: , Rfl:     isosorbide mononitrate (IMDUR) 30 MG 24 hr tablet, Take 30 mg by mouth once daily., Disp: , Rfl:     JANUVIA 100 mg Tab, TAKE 1 TABLET DAILY FOR DIABETES, Disp: , Rfl:     LAMICTAL 25 mg tablet, SMARTSI Tablet(s) By Mouth Every Evening, Disp: , Rfl:     lisinopriL-hydrochlorothiazide (PRINZIDE,ZESTORETIC) 20-12.5 mg per tablet, Take 1 tablet by mouth once daily. FOR BLOOD PRESSURE, Disp: , Rfl:     melatonin 12 mg Tab, Take 1 tablet by mouth every evening., Disp: , Rfl:     metFORMIN (GLUCOPHAGE) 1000 MG tablet, Take 1,000 mg by mouth every evening., Disp: , Rfl:     metoprolol tartrate (LOPRESSOR) 50 MG tablet, Take 50 mg by mouth 2 (two) times daily., Disp: , Rfl:     nicotine (NICODERM CQ) 14 mg/24 hr, Apply topically., Disp: , Rfl:     nitroGLYCERIN (NITROSTAT) 0.4 MG SL tablet, Place 0.4 mg under the tongue., Disp: , Rfl:     OLANZapine (ZYPREXA) 20 MG tablet, Take 20 mg by mouth every evening., Disp: , Rfl:     pantoprazole (PROTONIX) 40 MG tablet, Take 40 mg by mouth., Disp: , Rfl:     pioglitazone (ACTOS) 15 MG tablet, Take 15 mg by mouth every morning., Disp: , Rfl:     ranolazine (RANEXA) 500 MG Tb12, Take 500 mg by mouth 2 (two) times daily., Disp: , Rfl:     rosuvastatin (CRESTOR) 10 MG tablet, Take 40 mg by mouth once daily., Disp: , Rfl:      spironolactone (ALDACTONE) 25 MG tablet, Take 25 mg by mouth every morning., Disp: , Rfl:     sucralfate (CARAFATE) 1 gram tablet, Take 1 g by mouth 3 (three) times daily., Disp: , Rfl:     SYMBICORT 80-4.5 mcg/actuation HFAA, Inhale 1 puff into the lungs 2 (two) times daily., Disp: , Rfl:     TRUE METRIX GLUCOSE TEST STRIP Strp, SMARTSIG:Via Meter Daily, Disp: , Rfl:   No current facility-administered medications for this visit.    Facility-Administered Medications Ordered in Other Visits:     dextrose 10% bolus 125 mL 125 mL, 12.5 g, Intravenous, PRN, Osullivan-Saskia, Flor S, FNP    dextrose 10% bolus 125 mL 125 mL, 12.5 g, Intravenous, PRN, Osullivan-Saskia, Flor S, FNP    insulin aspart U-100 injection 2-9 Units, 2-9 Units, Subcutaneous, PRN, Abran-Dominguez Krugermy S, FNP    insulin aspart U-100 injection 4-12 Units, 4-12 Units, Subcutaneous, PRN, Dominguez Hernandezmy S, FNP    LIDOcaine (PF) 10 mg/ml (1%) injection 10 mg, 1 mL, Intradermal, Once, Abran-Flor Kruger S, FNP  Review of patient's allergies indicates:   Allergen Reactions    Pcn [penicillins] Hives    Wool Itching    Shrimp Hives and Itching    Fluoxetine Hallucinations    Omeprazole Palpitations     Hemoglobin A1c   Date Value Ref Range Status   07/31/2023 6.1 (H) 4.0 - 6.0 % Final   01/23/2023 6.1 <=7.0 % Final      Body mass index is 34.69 kg/m².   Vitals:    11/14/23 1224 11/14/23 1225   BP: 112/73    Pulse: 69    Weight: 80.6 kg (177 lb 9.6 oz)    Height: 5' (1.524 m)    PainSc:    6      REVIEW OF SYSTEMS:  A ten-point review of systems was performed and is negative, except as mentioned above   Objective:   MSK Shoulder Exam  General:  no apparent distress, no pain indicators,  obesity  Inspection: normal posture, normal alignment with visual symmetry of left compared to right shoulder noted, poor posture with rounded upper back noted  LEFT SHOULDER RIGHT SHOULDER   no soft tissue swelling, no guarding/ self imposed immobilization of shoulder, no  winged scapula, no erythema, no contusion,  no masses, no scars, no clavicle deformity, no shoulder dislocation deformity no soft tissue swelling, noted guarding/ self imposed immobilization of shoulder, no erythema, no contusion,  no masses, no scars, no clavicle deformity, no shoulder dislocation deformity     Palpation:     LEFT SHOULDER RIGHT SHOULDER   normal temperature, no deconditioning noted, no tenderness noted of global shoulder, no  crepitus with ROM, no palpable hypersensitive nodule/ trigger point normal temperature, noted deconditioning supraspinatus, noted deconditioning infraspinatus, noted tenderness of AC joint, bicipital groove, GH joint, supraspinatus, infraspinatus, and cervical spine, no tenderness noted of suprasternal notch/ sternoclavicular joint, clavicle, posterior wall of axilla, anterior wall of axilla, trapezius, rhomboids, scapula spine, and bicep, noted  crepitus with ROM, no palpable hypersensitive nodule/ trigger point     ROM Active Flexion / Extension  LEFT SHOULDER RIGHT SHOULDER   Abduction 180  Horizontal Adduction 45  Posterior Extension 45  Forward Flexion 180  Internal Rotation T4 - T8  External Rotation 60 Abduction 60  Horizontal Adduction 30  Posterior Extension 30  Forward Flexion 75  Internal Rotation sacrum  External Rotation 30     Strength   LEFT SHOULDER RIGHT SHOULDER   Flexion 5 / 5   Extension 5 / 5  Abductors 5 / 5   Adduction 5 / 5  External Rotation 5 / 5  Internal Rotation 5 / 5  Scapular Elevation 5 / 5  Scapular Protraction 5 / 5 Flexion 5 / 5   Extension 5 / 5  Abductors 5 / 5   Adduction 5 / 5  External Rotation 5 / 5  Internal Rotation 5 / 5  Scapular Elevation 5 / 5  Scapular Protraction 5 / 5     Special Testing:         Bicep Tendon L+ L-- R+ R-- Not Tested    Hook Test [] [x] [] [x] []    Marcos Sign [] [x] [] [x] []    Rotator Cuff         Full Can [] [x] [x] [] []    Empty Can [] [x] [x] [] []    Lift Off  [] [x] [x] [] []    Belly Press [] [x]  [x] [] []    Hornblower [] [x] [x] [] []    Drop Arm [] [x] [] [x] []    AC Joint         Cross Arm Adduction [] [x] [x] [] []    Impingement         Hawkin's [] [x] [x] [] []    Painful Arc  [] [x] [x] [] []    Painful Arc 170-180 [] [x] [] [] [x]    Instability         Shoulder Appreh. [] [x] [] [x] []    Labral         Blair's [] [x] [x] [] []       Cervical ROM Pain negative  Neurovascular: Intact to light touch  Neuro/ Psych: Awake, alert, oriented, normal mood and affect  Lymphatic: No LAD  Skin/ Soft Tissue: no rash, skin intact  Assessment:   IMAGING: X-ray 3 views of right shoulder dated 8/17/23 reviewed and independently interpreted by me.  Discussed with patient. Noted no acute, DJD noted.    XR SHOULDER COMPLETE 2 OR MORE VIEWS RIGHT 8/17/23  CLINICAL HISTORY AND TECHNIQUE:  Tono Rodriguez, RT on 8/17/2023  2:23 PM  CLINICAL HX: ER PT  C/O RT SHOULDER PAIN THROWING OUT TRASH, DENIES ANY TRAUMA  PAST MEDICAL HX: ROTATOR CUFF TEAR IN 1998, DIABETES, HLD, HTN, OSTEOARTHRITIS, HYSTER  TECHNIQUE: 3V RT SHOULDER  TECH: NN/KE  COMPARISON:  None  FINDINGS:  A subcentimeter, benign-appearing bone cyst is noted within the lateral aspect of the right clavicle.  There is moderate demineralization of the skeletal structures with mild to moderate degenerative changes noted involving the right glenohumeral joint and to a lesser extent the right acromioclavicular joint.  I see no definite fractures, dislocations, or other significant abnormalities.  Impression:  1. Chronic changes are present as described above.  See above comments.    EMR REVIEW: completed with noted Referral documentation reviewed    DIAGNOSIS:  1. Rotator cuff syndrome, right    2. BMI 34.0-34.9,adult       Plan:      Ongoing education about DX and treatment recommendations including conservative treatments of daily HEP with TheraBand, muscle strengthening, adequate vit D/C, glucosamine 1500 mg/day and daily acetaminophen 1000 mg 3 times/ day  if able to tolerate.    Treatment plan: Moderate exacerbation of chronic Right Rotator Cuff Syndrome MRI ordered s/t failed conservative treatments including: formal RX PT, CSI, and HEP > 3 months with inadequate relief.  Patient continues with symptoms of rotator cuff injury despite > 6 weeks treatment.  MRI required to evaluate need for surgical treatments.   Procedure: n/a  RX Medications: continue medications as RX per PCP .  RTC: after imaging complete       Leah Menard-Neumann FNP [facility]  Procedure Note:   None  Time Based Billing   Total Time Spent with Patient: 40 minutes .  Visit Start Time: 1230  10 minutes spent prior to visit reviewing EMR, prior labs and x-rays.  20 minutes spent in visit with patient face-to-face time completing exam, obtaining history, educating on DX and treatment plan.  10 minutes spent after visit completing EMR documentation.   Visit End Time: 1310    Please be aware that this note has been generated with the assistance of [employer] voice-to-text.  Please excuse any spelling or grammatical errors.

## 2023-12-06 ENCOUNTER — LAB VISIT (OUTPATIENT)
Dept: LAB | Facility: HOSPITAL | Age: 63
End: 2023-12-06
Attending: NURSE PRACTITIONER
Payer: MEDICAID

## 2023-12-06 DIAGNOSIS — Z79.899 ENCOUNTER FOR LONG-TERM (CURRENT) USE OF OTHER MEDICATIONS: Primary | ICD-10-CM

## 2023-12-06 LAB
ALBUMIN SERPL-MCNC: 4.3 G/DL (ref 3.4–5)
ALBUMIN/GLOB SERPL: 1.7 RATIO
ALP SERPL-CCNC: 60 UNIT/L (ref 50–144)
ALT SERPL-CCNC: 23 UNIT/L (ref 1–45)
ANION GAP SERPL CALC-SCNC: 9 MEQ/L (ref 2–13)
AST SERPL-CCNC: 23 UNIT/L (ref 14–36)
BASOPHILS # BLD AUTO: 0.14 X10(3)/MCL (ref 0.01–0.08)
BASOPHILS NFR BLD AUTO: 1.2 % (ref 0.1–1.2)
BILIRUB SERPL-MCNC: 0.3 MG/DL (ref 0–1)
BUN SERPL-MCNC: 29 MG/DL (ref 7–20)
CALCIUM SERPL-MCNC: 9.4 MG/DL (ref 8.4–10.2)
CHLORIDE SERPL-SCNC: 101 MMOL/L (ref 98–110)
CHOLEST SERPL-MCNC: 175 MG/DL (ref 0–200)
CO2 SERPL-SCNC: 26 MMOL/L (ref 21–32)
CREAT SERPL-MCNC: 1.06 MG/DL (ref 0.66–1.25)
CREAT/UREA NIT SERPL: 27 (ref 12–20)
DEPRECATED CALCIDIOL+CALCIFEROL SERPL-MC: 20.1 NG/ML (ref 30–80)
EOSINOPHIL # BLD AUTO: 0.18 X10(3)/MCL (ref 0.04–0.36)
EOSINOPHIL NFR BLD AUTO: 1.6 % (ref 0.7–7)
ERYTHROCYTE [DISTWIDTH] IN BLOOD BY AUTOMATED COUNT: 13.3 % (ref 11–14.5)
GFR SERPLBLD CREATININE-BSD FMLA CKD-EPI: 59 MLS/MIN/1.73/M2
GLOBULIN SER-MCNC: 2.5 GM/DL (ref 2–3.9)
GLUCOSE SERPL-MCNC: 151 MG/DL (ref 70–115)
HCT VFR BLD AUTO: 38.9 % (ref 36–48)
HDLC SERPL-MCNC: 38 MG/DL (ref 40–60)
HGB BLD-MCNC: 13 G/DL (ref 11.8–16)
IMM GRANULOCYTES # BLD AUTO: 0.44 X10(3)/MCL (ref 0–0.03)
IMM GRANULOCYTES NFR BLD AUTO: 3.8 % (ref 0–0.5)
LDLC SERPL DIRECT ASSAY-SCNC: 93.8 MG/DL (ref 30–100)
LYMPHOCYTES # BLD AUTO: 2.02 X10(3)/MCL (ref 1.16–3.74)
LYMPHOCYTES NFR BLD AUTO: 17.7 % (ref 20–55)
MAGNESIUM SERPL-MCNC: 2.2 MG/DL (ref 1.8–2.4)
MCH RBC QN AUTO: 29.7 PG (ref 27–34)
MCHC RBC AUTO-ENTMCNC: 33.4 G/DL (ref 31–37)
MCV RBC AUTO: 88.8 FL (ref 79–99)
MONOCYTES # BLD AUTO: 0.91 X10(3)/MCL (ref 0.24–0.36)
MONOCYTES NFR BLD AUTO: 8 % (ref 4.7–12.5)
NEUTROPHILS # BLD AUTO: 7.75 X10(3)/MCL (ref 1.56–6.13)
NEUTROPHILS NFR BLD AUTO: 67.7 % (ref 37–73)
NRBC BLD AUTO-RTO: 0 %
PLATELET # BLD AUTO: 119 X10(3)/MCL (ref 140–371)
PMV BLD AUTO: 11.2 FL (ref 9.4–12.4)
POTASSIUM SERPL-SCNC: 5.3 MMOL/L (ref 3.5–5.1)
PROT SERPL-MCNC: 6.8 GM/DL (ref 6.3–8.2)
RBC # BLD AUTO: 4.38 X10(6)/MCL (ref 4–5.1)
SODIUM SERPL-SCNC: 136 MMOL/L (ref 135–145)
TRIGL SERPL-MCNC: 330 MG/DL (ref 30–200)
TSH SERPL-ACNC: 1.11 UIU/ML (ref 0.36–3.74)
WBC # SPEC AUTO: 11.44 X10(3)/MCL (ref 4–11.5)

## 2023-12-06 PROCEDURE — 85025 COMPLETE CBC W/AUTO DIFF WBC: CPT

## 2023-12-06 PROCEDURE — 80061 LIPID PANEL: CPT

## 2023-12-06 PROCEDURE — 36415 COLL VENOUS BLD VENIPUNCTURE: CPT

## 2023-12-06 PROCEDURE — 83735 ASSAY OF MAGNESIUM: CPT

## 2023-12-06 PROCEDURE — 82306 VITAMIN D 25 HYDROXY: CPT

## 2023-12-06 PROCEDURE — 84443 ASSAY THYROID STIM HORMONE: CPT

## 2023-12-06 PROCEDURE — 80053 COMPREHEN METABOLIC PANEL: CPT

## 2023-12-18 ENCOUNTER — OFFICE VISIT (OUTPATIENT)
Dept: ORTHOPEDICS | Facility: CLINIC | Age: 63
End: 2023-12-18
Payer: MEDICAID

## 2023-12-18 VITALS
WEIGHT: 179.63 LBS | TEMPERATURE: 98 F | SYSTOLIC BLOOD PRESSURE: 95 MMHG | DIASTOLIC BLOOD PRESSURE: 62 MMHG | RESPIRATION RATE: 20 BRPM | HEIGHT: 60 IN | OXYGEN SATURATION: 97 % | HEART RATE: 61 BPM | BODY MASS INDEX: 35.27 KG/M2

## 2023-12-18 DIAGNOSIS — M19.019 SHOULDER ARTHRITIS: ICD-10-CM

## 2023-12-18 DIAGNOSIS — M75.121 NONTRAUMATIC COMPLETE TEAR OF RIGHT ROTATOR CUFF: Primary | ICD-10-CM

## 2023-12-18 PROCEDURE — 4010F ACE/ARB THERAPY RXD/TAKEN: CPT | Mod: CPTII,,, | Performed by: NURSE PRACTITIONER

## 2023-12-18 PROCEDURE — 1160F RVW MEDS BY RX/DR IN RCRD: CPT | Mod: CPTII,,, | Performed by: NURSE PRACTITIONER

## 2023-12-18 PROCEDURE — 3074F PR MOST RECENT SYSTOLIC BLOOD PRESSURE < 130 MM HG: ICD-10-PCS | Mod: CPTII,,, | Performed by: NURSE PRACTITIONER

## 2023-12-18 PROCEDURE — 99214 OFFICE O/P EST MOD 30 MIN: CPT | Mod: S$PBB,,, | Performed by: NURSE PRACTITIONER

## 2023-12-18 PROCEDURE — 1159F PR MEDICATION LIST DOCUMENTED IN MEDICAL RECORD: ICD-10-PCS | Mod: CPTII,,, | Performed by: NURSE PRACTITIONER

## 2023-12-18 PROCEDURE — 3044F PR MOST RECENT HEMOGLOBIN A1C LEVEL <7.0%: ICD-10-PCS | Mod: CPTII,,, | Performed by: NURSE PRACTITIONER

## 2023-12-18 PROCEDURE — 4010F PR ACE/ARB THEARPY RXD/TAKEN: ICD-10-PCS | Mod: CPTII,,, | Performed by: NURSE PRACTITIONER

## 2023-12-18 PROCEDURE — 1159F MED LIST DOCD IN RCRD: CPT | Mod: CPTII,,, | Performed by: NURSE PRACTITIONER

## 2023-12-18 PROCEDURE — 3008F PR BODY MASS INDEX (BMI) DOCUMENTED: ICD-10-PCS | Mod: CPTII,,, | Performed by: NURSE PRACTITIONER

## 2023-12-18 PROCEDURE — 99214 PR OFFICE/OUTPT VISIT, EST, LEVL IV, 30-39 MIN: ICD-10-PCS | Mod: S$PBB,,, | Performed by: NURSE PRACTITIONER

## 2023-12-18 PROCEDURE — 3008F BODY MASS INDEX DOCD: CPT | Mod: CPTII,,, | Performed by: NURSE PRACTITIONER

## 2023-12-18 PROCEDURE — 3044F HG A1C LEVEL LT 7.0%: CPT | Mod: CPTII,,, | Performed by: NURSE PRACTITIONER

## 2023-12-18 PROCEDURE — 1160F PR REVIEW ALL MEDS BY PRESCRIBER/CLIN PHARMACIST DOCUMENTED: ICD-10-PCS | Mod: CPTII,,, | Performed by: NURSE PRACTITIONER

## 2023-12-18 PROCEDURE — 3074F SYST BP LT 130 MM HG: CPT | Mod: CPTII,,, | Performed by: NURSE PRACTITIONER

## 2023-12-18 PROCEDURE — 3078F PR MOST RECENT DIASTOLIC BLOOD PRESSURE < 80 MM HG: ICD-10-PCS | Mod: CPTII,,, | Performed by: NURSE PRACTITIONER

## 2023-12-18 PROCEDURE — 3078F DIAST BP <80 MM HG: CPT | Mod: CPTII,,, | Performed by: NURSE PRACTITIONER

## 2023-12-18 PROCEDURE — 99215 OFFICE O/P EST HI 40 MIN: CPT | Mod: PBBFAC | Performed by: NURSE PRACTITIONER

## 2023-12-18 NOTE — PROGRESS NOTES
Subjective:   PATIENT ID: Adilia Gregg is a 63 y.o. female. Smoker. Employment HX: CNA, currently disability.    Seen [facility] for same DX since n/a.  Previously seen by ortho res. For left wrist fracture w/ ORIF 2023.  CHIEF COMPLAINT: Evaluation of the Right Shoulder (Here for f/u check-up and results of MRI.)    HPI:    Right aching  global  shoulder pain. Right dominate  Injury: no history of significant injuries or previous surgeries  Onset: several years ago fluctuates   Modifying Factors: increased with overhead movement, worse with activity, increased when lying on affected shoulder, and increased pain at PM  Associated Symptoms: popping, decreased ROM, and difficulty sleeping s/t pain  Activity: sedentary with light activity and pain moderately interferes with ADLs .   Previous Treatments: CSI since  last [place]  [place] [facility] [place], [place] , HEP with TheraBand, RX NSAIDs, and RX PT completed 12 wks/ 3 xs per week d/c'd   without adequate relief at this time.   PMH: + kidney impairment and HX stroke  Family History: + OA    NOTE: Follow up here today for MRI results.  Has been doing HEP with some improvements.     Current Outpatient Medications:     albuterol (PROVENTIL/VENTOLIN HFA) 90 mcg/actuation inhaler, SMARTSI Puff(s) Via Inhaler 4 Times Daily PRN, Disp: , Rfl:     ALLERGY RELIEF, LORATADINE, 10 mg tablet, Take 10 mg by mouth., Disp: , Rfl:     aspirin (ECOTRIN) 81 MG EC tablet, Take 81 mg by mouth once daily., Disp: , Rfl:     busPIRone (BUSPAR) 15 MG tablet, Take 7.5 mg by mouth 2 (two) times daily., Disp: , Rfl:     citalopram (CELEXA) 20 MG tablet, Take 20 mg by mouth., Disp: , Rfl:     diclofenac (VOLTAREN) 50 MG EC tablet, Take 50 mg by mouth 2 (two) times daily., Disp: , Rfl:     diclofenac sodium (VOLTAREN) 1 % Gel, Apply 1 g topically., Disp: , Rfl:     DULoxetine (CYMBALTA) 20 MG capsule, Take 20 mg by mouth once daily., Disp: , Rfl:     empagliflozin (JARDIANCE) 10 mg  tablet, Take 10 mg by mouth once daily., Disp: , Rfl:     fenofibrate (TRICOR) 145 MG tablet, Take 145 mg by mouth., Disp: , Rfl:     furosemide (LASIX) 20 MG tablet, Take 20 mg by mouth once daily., Disp: , Rfl:     gabapentin (NEURONTIN) 300 MG capsule, Take 300 mg by mouth 2 (two) times daily., Disp: , Rfl:     LAMICTAL 25 mg tablet, SMARTSI Tablet(s) By Mouth Every Evening, Disp: , Rfl:     lisinopriL-hydrochlorothiazide (PRINZIDE,ZESTORETIC) 20-12.5 mg per tablet, Take 1 tablet by mouth once daily. FOR BLOOD PRESSURE, Disp: , Rfl:     melatonin 12 mg Tab, Take 1 tablet by mouth every evening., Disp: , Rfl:     metoprolol tartrate (LOPRESSOR) 50 MG tablet, Take 50 mg by mouth 2 (two) times daily., Disp: , Rfl:     nitroGLYCERIN (NITROSTAT) 0.4 MG SL tablet, Place 0.4 mg under the tongue., Disp: , Rfl:     OLANZapine (ZYPREXA) 20 MG tablet, Take 20 mg by mouth every evening., Disp: , Rfl:     pantoprazole (PROTONIX) 40 MG tablet, Take 40 mg by mouth., Disp: , Rfl:     pioglitazone (ACTOS) 15 MG tablet, Take 15 mg by mouth every morning., Disp: , Rfl:     ranolazine (RANEXA) 500 MG Tb12, Take 500 mg by mouth 2 (two) times daily., Disp: , Rfl:     rosuvastatin (CRESTOR) 10 MG tablet, Take 40 mg by mouth once daily., Disp: , Rfl:     spironolactone (ALDACTONE) 25 MG tablet, Take 25 mg by mouth every morning., Disp: , Rfl:     SYMBICORT 80-4.5 mcg/actuation HFAA, Inhale 1 puff into the lungs 2 (two) times daily., Disp: , Rfl:     TRUE METRIX GLUCOSE TEST STRIP Strp, SMARTSIG:Via Meter Daily, Disp: , Rfl:     docusate sodium (COLACE) 100 MG capsule, Take 100 mg by mouth 2 (two) times daily as needed for Constipation., Disp: , Rfl:     INVOKANA 100 mg Tab tablet, TAKE 1 TABLET DAILY FOR DIABETES, Disp: , Rfl:     isosorbide mononitrate (IMDUR) 30 MG 24 hr tablet, Take 30 mg by mouth once daily., Disp: , Rfl:     JANUVIA 100 mg Tab, TAKE 1 TABLET DAILY FOR DIABETES, Disp: , Rfl:     metFORMIN (GLUCOPHAGE) 1000 MG  tablet, Take 1,000 mg by mouth every evening., Disp: , Rfl:     nicotine (NICODERM CQ) 14 mg/24 hr, Apply topically., Disp: , Rfl:     sucralfate (CARAFATE) 1 gram tablet, Take 1 g by mouth 3 (three) times daily., Disp: , Rfl:   No current facility-administered medications for this visit.    Facility-Administered Medications Ordered in Other Visits:     dextrose 10% bolus 125 mL 125 mL, 12.5 g, Intravenous, PRN, Osullivan-Saskia, Flor S, FNP    dextrose 10% bolus 125 mL 125 mL, 12.5 g, Intravenous, PRN, Osullivan-Saskia, Flor S, FNP    insulin aspart U-100 injection 2-9 Units, 2-9 Units, Subcutaneous, PRN, Osullivan-Saskia, Flor S, FNP    insulin aspart U-100 injection 4-12 Units, 4-12 Units, Subcutaneous, PRN, Osullivan-Saskia, Flor S, FNP    LIDOcaine (PF) 10 mg/ml (1%) injection 10 mg, 1 mL, Intradermal, Once, Osullivan-Saskia, Flor S, FNP  Review of patient's allergies indicates:   Allergen Reactions    Pcn [penicillins] Hives    Wool Itching    Shrimp Hives and Itching    Fluoxetine Hallucinations    Omeprazole Palpitations     Hemoglobin A1c   Date Value Ref Range Status   07/31/2023 6.1 (H) 4.0 - 6.0 % Final   01/23/2023 6.1 <=7.0 % Final      Body mass index is 35.08 kg/m².   Vitals:    12/18/23 1052   BP: 95/62   Pulse: 61   Resp: 20   Temp: 97.6 °F (36.4 °C)   TempSrc: Oral   SpO2: 97%   Weight: 81.5 kg (179 lb 9.6 oz)   Height: 5' (1.524 m)   PainSc:   2   PainLoc: Shoulder      REVIEW OF SYSTEMS:  A ten-point review of systems was performed and is negative, except as mentioned above   Objective:   MSK Shoulder Exam  General:  no apparent distress, no pain indicators,  obesity  Inspection: normal posture, normal alignment with visual symmetry of left compared to right shoulder noted, poor posture with rounded upper back noted  LEFT SHOULDER RIGHT SHOULDER   no soft tissue swelling, no guarding/ self imposed immobilization of shoulder, no winged scapula, no erythema, no contusion,  no masses, no scars, no clavicle  deformity, no shoulder dislocation deformity no soft tissue swelling, noted guarding/ self imposed immobilization of shoulder, no erythema, no contusion,  no masses, no scars, no clavicle deformity, no shoulder dislocation deformity     Palpation:     LEFT SHOULDER RIGHT SHOULDER   normal temperature, no deconditioning noted, no tenderness noted of global shoulder, no  crepitus with ROM, no palpable hypersensitive nodule/ trigger point normal temperature, noted deconditioning supraspinatus, noted deconditioning infraspinatus, noted tenderness of AC joint, bicipital groove, GH joint, supraspinatus, infraspinatus, and cervical spine, no tenderness noted of suprasternal notch/ sternoclavicular joint, clavicle, posterior wall of axilla, anterior wall of axilla, trapezius, rhomboids, scapula spine, and bicep, noted  crepitus with ROM, no palpable hypersensitive nodule/ trigger point     ROM Active Flexion / Extension  LEFT SHOULDER RIGHT SHOULDER   Abduction 180  Horizontal Adduction 45  Posterior Extension 45  Forward Flexion 180  Internal Rotation T4 - T8  External Rotation 60 Abduction 80  Horizontal Adduction 35  Posterior Extension 35  Forward Flexion 75  Internal Rotation sacrum  External Rotation 35     Strength   LEFT SHOULDER RIGHT SHOULDER   Flexion 5 / 5   Extension 5 / 5  Abductors 5 / 5   Adduction 5 / 5  External Rotation 5 / 5  Internal Rotation 5 / 5  Scapular Elevation 5 / 5  Scapular Protraction 5 / 5 Flexion 5 / 5   Extension 5 / 5  Abductors 5 / 5   Adduction 5 / 5  External Rotation 5 / 5  Internal Rotation 5 / 5  Scapular Elevation 5 / 5  Scapular Protraction 5 / 5     Special Testing:         Bicep Tendon L+ L-- R+ R-- Not Tested    Hook Test [] [x] [] [x] []    Marcos Sign [] [x] [] [x] []    Rotator Cuff         Full Can [] [x] [x] [] []    Empty Can [] [x] [x] [] []    Lift Off  [] [x] [x] [] []    Belly Press [] [x] [x] [] []    Hornblower [] [x] [x] [] []    Drop Arm [] [x] [] [x] []    AC  Joint         Cross Arm Adduction [] [x] [x] [] []    Impingement         Hawkin's [] [x] [x] [] []    Painful Arc  [] [x] [x] [] []    Painful Arc 170-180 [] [x] [] [] [x]    Instability         Shoulder Appreh. [] [x] [] [x] []    Labral         [place]'s [] [x] [x] [] []       Cervical ROM Pain negative  Neurovascular: Intact to light touch  Neuro/ Psych: Awake, alert, oriented, normal mood and affect  Lymphatic: No LAD  Skin/ Soft Tissue: no rash, skin intact  Assessment:   IMAGING: X-ray 3 views of right shoulder dated 8/17/23 reviewed and independently interpreted by me.  Discussed with patient. Noted no acute, DJD noted.    XR SHOULDER COMPLETE 2 OR MORE VIEWS RIGHT 8/17/23  CLINICAL HISTORY AND TECHNIQUE:  Tono Rodriguez, RT on 8/17/2023  2:23 PM  CLINICAL HX: ER PT  C/O RT SHOULDER PAIN THROWING OUT TRASH, DENIES ANY TRAUMA  PAST MEDICAL HX: ROTATOR CUFF TEAR IN 1998, DIABETES, HLD, HTN, OSTEOARTHRITIS, HYSTER  TECHNIQUE: 3V RT SHOULDER  TECH: NN/KE  COMPARISON:  None  FINDINGS:  A subcentimeter, benign-appearing bone cyst is noted within the lateral aspect of the right clavicle.  There is moderate demineralization of the skeletal structures with mild to moderate degenerative changes noted involving the right glenohumeral joint and to a lesser extent the right acromioclavicular joint.  I see no definite fractures, dislocations, or other significant abnormalities.  Impression:  1. Chronic changes are present as described above.  See above comments.    MRI SHOULDER WITHOUT CONTRAST RIGHT 11/22/23  CLINICAL HISTORY:  Shoulder pain, rotator cuff disorder suspected, xray done;  Pain in right shoulder  TECHNIQUE:  Multiplanar multislice images are were performed through the right shoulder.  COMPARISON:  Radiographs dated 08/17/2023  FINDINGS:  Complete tearing of the supraspinatus and infraspinatus with retraction to the level of the glenohumeral joint, with associated muscular fatty atrophy.  Tendinosis of  the subscapularis without discrete tear.  Teres minor is intact.  The long head of biceps tendon is intact.  No definitive labral tear or full-thickness cartilage defects.  Moderate glenohumeral effusion communicating with the subacromial/subdeltoid bursal space.  The acromioclavicular joint appears well preserved without lateral downsloping no subacromial spurring or os acromiale.  Marrow signal is normal.  There is no Hill-Sachs deformity or os acromiale.  Impression:  Complete tearing of the supraspinatus and infraspinatus, with associated muscular fatty atrophy.  Tendinosis of the subscapularis without discrete tear.    EMR REVIEW: completed with noted prior visit 11/14/23 reviewed.    DIAGNOSIS:  1. Nontraumatic complete tear of right rotator cuff    2. Shoulder arthritis    3. BMI 35.0-35.9,adult      Plan:      Ongoing education about DX and treatment recommendations including conservative treatments of daily HEP with TheraBand, muscle strengthening, adequate vit D/C, glucosamine 1500 mg/day and daily acetaminophen 1000 mg 3 times/ day if able to tolerate.    Treatment plan: Moderate exacerbation of chronic Right GH arthritis, AC joint arthritis, and Rotator Cuff Tear MRI results and images reviewed. Extended time spent with patient educating on MRI findings and possible treatment options.  Discussed surgical eval versus non-surgical treatment options including but not limited to: RX PT, HEP, and/or CSI for symptoms relief.   Patient states she has considered having surgery but is concerned she would not have help to recover post-op.  Will continue HEP and RTC if desires further treatments.     Procedure:  offered, patient declines due to concerns of DM   RX Medications: continue medications as RX per PCP .  RTC: PRN if symptoms worsen or return       Leah Menard-Neumann FNP [facility]  Procedure Note:   None  Time Based Billing   Total Time Spent with Patient: 30 minutes  .  Visit Start Time: 1100  10 minutes spent prior to visit reviewing EMR, prior labs and x-rays.  20 minutes spent in visit with patient face-to-face time completing exam, obtaining history, educating on DX and treatment plan.  10 minutes spent after visit completing EMR documentation.   Visit End Time: 1130    Please be aware that this note has been generated with the assistance of [email or web address] voice-to-text.  Please excuse any spelling or grammatical errors.

## 2024-01-22 PROBLEM — N39.0 UTI (URINARY TRACT INFECTION): Status: RESOLVED | Noted: 2023-10-20 | Resolved: 2024-01-22

## 2024-01-24 ENCOUNTER — HOSPITAL ENCOUNTER (OUTPATIENT)
Dept: RADIOLOGY | Facility: HOSPITAL | Age: 64
Discharge: HOME | End: 2024-01-24
Attending: INTERNAL MEDICINE
Payer: MEDICAID

## 2024-01-24 DIAGNOSIS — F17.210 CIGARETTE SMOKER: ICD-10-CM

## 2024-01-24 DIAGNOSIS — K75.81 NONALCOHOLIC STEATOHEPATITIS: ICD-10-CM

## 2024-01-24 PROCEDURE — 76700 US EXAM ABDOM COMPLETE: CPT | Mod: TC

## 2024-01-24 PROCEDURE — 71271 CT THORAX LUNG CANCER SCR C-: CPT | Mod: TC

## 2024-02-23 ENCOUNTER — HOSPITAL ENCOUNTER (EMERGENCY)
Facility: HOSPITAL | Age: 64
Discharge: HOME | End: 2024-02-23
Payer: MEDICAID

## 2024-02-23 VITALS
BODY MASS INDEX: 35.37 KG/M2 | RESPIRATION RATE: 18 BRPM | OXYGEN SATURATION: 97 % | WEIGHT: 180.13 LBS | HEIGHT: 60 IN | DIASTOLIC BLOOD PRESSURE: 58 MMHG | TEMPERATURE: 98 F | SYSTOLIC BLOOD PRESSURE: 112 MMHG | HEART RATE: 74 BPM

## 2024-02-23 DIAGNOSIS — R10.13 EPIGASTRIC ABDOMINAL PAIN: Primary | ICD-10-CM

## 2024-02-23 DIAGNOSIS — R07.9 CHEST PAIN: ICD-10-CM

## 2024-02-23 DIAGNOSIS — K29.00 ACUTE SUPERFICIAL GASTRITIS WITHOUT HEMORRHAGE: ICD-10-CM

## 2024-02-23 LAB
ALBUMIN SERPL-MCNC: 4.8 G/DL (ref 3.4–5)
ALBUMIN/GLOB SERPL: 1.5 RATIO
ALP SERPL-CCNC: 104 UNIT/L (ref 50–144)
ALT SERPL-CCNC: 27 UNIT/L (ref 1–45)
ANION GAP SERPL CALC-SCNC: 11 MEQ/L (ref 2–13)
AST SERPL-CCNC: 25 UNIT/L (ref 14–36)
BASOPHILS # BLD AUTO: 0.11 X10(3)/MCL (ref 0.01–0.08)
BASOPHILS NFR BLD AUTO: 0.9 % (ref 0.1–1.2)
BILIRUB SERPL-MCNC: 0.5 MG/DL (ref 0–1)
BNP BLD-MCNC: 24.8 PG/ML (ref 0–124.9)
BUN SERPL-MCNC: 25 MG/DL (ref 7–20)
CALCIUM SERPL-MCNC: 10.1 MG/DL (ref 8.4–10.2)
CHLORIDE SERPL-SCNC: 95 MMOL/L (ref 98–110)
CO2 SERPL-SCNC: 24 MMOL/L (ref 21–32)
CREAT SERPL-MCNC: 1.26 MG/DL (ref 0.66–1.25)
CREAT/UREA NIT SERPL: 20 (ref 12–20)
EOSINOPHIL # BLD AUTO: 0.11 X10(3)/MCL (ref 0.04–0.36)
EOSINOPHIL NFR BLD AUTO: 0.9 % (ref 0.7–7)
ERYTHROCYTE [DISTWIDTH] IN BLOOD BY AUTOMATED COUNT: 13.6 % (ref 11–14.5)
GFR SERPLBLD CREATININE-BSD FMLA CKD-EPI: 48 MLS/MIN/1.73/M2
GLOBULIN SER-MCNC: 3.1 GM/DL (ref 2–3.9)
GLUCOSE SERPL-MCNC: 171 MG/DL (ref 70–115)
HCT VFR BLD AUTO: 40.9 % (ref 36–48)
HGB BLD-MCNC: 13.9 G/DL (ref 11.8–16)
IMM GRANULOCYTES # BLD AUTO: 0.22 X10(3)/MCL (ref 0–0.03)
IMM GRANULOCYTES NFR BLD AUTO: 1.8 % (ref 0–0.5)
LIPASE SERPL-CCNC: 126 U/L (ref 23–300)
LYMPHOCYTES # BLD AUTO: 3.08 X10(3)/MCL (ref 1.16–3.74)
LYMPHOCYTES NFR BLD AUTO: 24.7 % (ref 20–55)
MCH RBC QN AUTO: 28.6 PG (ref 27–34)
MCHC RBC AUTO-ENTMCNC: 34 G/DL (ref 31–37)
MCV RBC AUTO: 84.2 FL (ref 79–99)
MONOCYTES # BLD AUTO: 0.84 X10(3)/MCL (ref 0.24–0.36)
MONOCYTES NFR BLD AUTO: 6.7 % (ref 4.7–12.5)
NEUTROPHILS # BLD AUTO: 8.12 X10(3)/MCL (ref 1.56–6.13)
NEUTROPHILS NFR BLD AUTO: 65 % (ref 37–73)
NRBC BLD AUTO-RTO: 0 %
PLATELET # BLD AUTO: 99 X10(3)/MCL (ref 140–371)
PMV BLD AUTO: 11.8 FL (ref 9.4–12.4)
POTASSIUM SERPL-SCNC: 4.5 MMOL/L (ref 3.5–5.1)
PROT SERPL-MCNC: 7.9 GM/DL (ref 6.3–8.2)
RBC # BLD AUTO: 4.86 X10(6)/MCL (ref 4–5.1)
SODIUM SERPL-SCNC: 130 MMOL/L (ref 135–145)
TROPONIN I SERPL-MCNC: <0.012 NG/ML (ref 0–0.03)
WBC # SPEC AUTO: 12.48 X10(3)/MCL (ref 4–11.5)

## 2024-02-23 PROCEDURE — 80053 COMPREHEN METABOLIC PANEL: CPT

## 2024-02-23 PROCEDURE — 84484 ASSAY OF TROPONIN QUANT: CPT

## 2024-02-23 PROCEDURE — 85025 COMPLETE CBC W/AUTO DIFF WBC: CPT

## 2024-02-23 PROCEDURE — 83880 ASSAY OF NATRIURETIC PEPTIDE: CPT

## 2024-02-23 PROCEDURE — 96361 HYDRATE IV INFUSION ADD-ON: CPT

## 2024-02-23 PROCEDURE — 96375 TX/PRO/DX INJ NEW DRUG ADDON: CPT

## 2024-02-23 PROCEDURE — 63600175 PHARM REV CODE 636 W HCPCS

## 2024-02-23 PROCEDURE — 83690 ASSAY OF LIPASE: CPT

## 2024-02-23 PROCEDURE — 93005 ELECTROCARDIOGRAM TRACING: CPT

## 2024-02-23 PROCEDURE — 99285 EMERGENCY DEPT VISIT HI MDM: CPT | Mod: 25

## 2024-02-23 PROCEDURE — 25000003 PHARM REV CODE 250

## 2024-02-23 PROCEDURE — 96374 THER/PROPH/DIAG INJ IV PUSH: CPT

## 2024-02-23 PROCEDURE — 93010 ELECTROCARDIOGRAM REPORT: CPT | Mod: ,,, | Performed by: INTERNAL MEDICINE

## 2024-02-23 PROCEDURE — 96372 THER/PROPH/DIAG INJ SC/IM: CPT | Mod: 59

## 2024-02-23 RX ORDER — CHOLECALCIFEROL (VITAMIN D3) 25 MCG
1000 TABLET ORAL DAILY
COMMUNITY

## 2024-02-23 RX ORDER — FAMOTIDINE 10 MG/ML
20 INJECTION INTRAVENOUS
Status: COMPLETED | OUTPATIENT
Start: 2024-02-23 | End: 2024-02-23

## 2024-02-23 RX ORDER — HYDROXYZINE 50 MG/ML
100 INJECTION, SOLUTION INTRAMUSCULAR ONCE
Status: COMPLETED | OUTPATIENT
Start: 2024-02-23 | End: 2024-02-23

## 2024-02-23 RX ORDER — OMEPRAZOLE 20 MG/1
20 TABLET, DELAYED RELEASE ORAL DAILY
COMMUNITY

## 2024-02-23 RX ORDER — PROCHLORPERAZINE EDISYLATE 5 MG/ML
5 INJECTION INTRAMUSCULAR; INTRAVENOUS
Status: COMPLETED | OUTPATIENT
Start: 2024-02-23 | End: 2024-02-23

## 2024-02-23 RX ORDER — CETIRIZINE HYDROCHLORIDE 10 MG/1
10 TABLET ORAL DAILY
COMMUNITY

## 2024-02-23 RX ORDER — HYDROXYZINE PAMOATE 50 MG/1
50 CAPSULE ORAL EVERY 6 HOURS PRN
Qty: 20 CAPSULE | Refills: 0 | Status: SHIPPED | OUTPATIENT
Start: 2024-02-23

## 2024-02-23 RX ORDER — IPRATROPIUM BROMIDE 42 UG/1
2 SPRAY, METERED NASAL 4 TIMES DAILY
COMMUNITY

## 2024-02-23 RX ADMIN — HYDROXYZINE HYDROCHLORIDE 100 MG: 50 INJECTION, SOLUTION INTRAMUSCULAR at 03:02

## 2024-02-23 RX ADMIN — SODIUM CHLORIDE 1000 ML: 9 INJECTION, SOLUTION INTRAVENOUS at 03:02

## 2024-02-23 RX ADMIN — PROCHLORPERAZINE EDISYLATE 5 MG: 5 INJECTION INTRAMUSCULAR; INTRAVENOUS at 04:02

## 2024-02-23 RX ADMIN — FAMOTIDINE 20 MG: 10 INJECTION, SOLUTION INTRAVENOUS at 03:02

## 2024-02-23 NOTE — ED PROVIDER NOTES
"Encounter Date: 2/23/2024       History     Chief Complaint   Patient presents with    Abdominal Pain     Pt c/o epigastric pain starting "this morning that comes and goes." Pt denies traveling of pain. Pt c/o of Nausea and "dry heaving;" denies diarrhea. Pt capillary refill <3. Pt acyanotic. No acute distress noted.       64-year-old female presents complaining of epigastric pain that began about 2 hours prior to arrival.  She is also nauseated, and had some dry heaves.  Says that the pain is making her a little short of breath.  Denies any fever or chills.  She accidentally took her morning meds last night, and skipped her morning meds this morning.  She reports having a gallbladder evaluation a couple of months ago, which was all negative.    The history is provided by the patient.     Review of patient's allergies indicates:   Allergen Reactions    Pcn [penicillins] Hives    Wool Itching    Shrimp Hives and Itching    Fluoxetine Hallucinations    Omeprazole Palpitations     Past Medical History:   Diagnosis Date    Anxiety and depression     Bipolar disorder, unspecified     Chest pain     per CARDs: CORONARY MICROVASCULAR DYSFUNCTION    Chronic venous insufficiency     CKD (chronic kidney disease)     COPD (chronic obstructive pulmonary disease)     Diabetes mellitus, type 2     Diabetic peripheral neuropathy associated with type 2 diabetes mellitus     HLD (hyperlipidemia)     Hypertension     ARVIN (obstructive sleep apnea)     Osteoarthritis     PAD (peripheral artery disease)     w/NEUROGENIC CLAUDICATION    Personal history of colonic polyps     Joe Barnes MD     Past Surgical History:   Procedure Laterality Date    APPENDECTOMY      BILATERAL SALPINGO-OOPHORECTOMY (BSO)  2001    BREAST BIOPSY Right 2000    Benign    COLONOSCOPY  03/29/2018    EXCISION-ADENOMA  03/29/2018    OPEN REDUCTION AND INTERNAL FIXATION (ORIF) OF FRACTURE OF DISTAL RADIUS Left 01/26/2023    Procedure: ORIF, FRACTURE, RADIUS, " DISTAL;  Surgeon: Manny Mariscal MD;  Location: [facility];  Service: Orthopedics;  Laterality: Left;  supine, regular or bed with hand table, tourniquet, c-arm    TOTAL ABDOMINAL HYSTERECTOMY  12/29/1983     Family History   Problem Relation Age of Onset    Breast cancer Mother     Breast cancer Paternal Aunt     Breast cancer Paternal Grandmother     Ovarian cancer Neg Hx     Uterine cancer Neg Hx     Colon cancer Neg Hx     Cervical cancer Neg Hx      Social History     Tobacco Use    Smoking status: Every Day     Current packs/day: 1.00     Average packs/day: 1 pack/day for 47.0 years (47.0 ttl pk-yrs)     Types: Cigarettes    Smokeless tobacco: Never   Substance Use Topics    Alcohol use: Never    Drug use: Never     Types: Marijuana     Comment: 40 years ago     Review of Systems   Constitutional:  Negative for fever.   HENT:  Negative for sore throat.    Respiratory:  Positive for shortness of breath.    Cardiovascular:  Negative for chest pain.   Gastrointestinal:  Positive for abdominal pain and nausea.   Genitourinary:  Negative for dysuria.   Musculoskeletal:  Negative for back pain.   Skin:  Negative for rash.   Neurological:  Negative for weakness.   Hematological:  Does not bruise/bleed easily.   All other systems reviewed and are negative.      Physical Exam     Initial Vitals [02/23/24 1504]   BP Pulse Resp Temp SpO2   133/75 91 18 98.1 °F (36.7 °C) 98 %      MAP       --         Physical Exam    Nursing note and vitals reviewed.  Constitutional: Vital signs are normal. She appears well-developed and well-nourished. She is cooperative.   HENT:   Head: Normocephalic and atraumatic.   Mouth/Throat: Oropharynx is clear and moist.   Eyes: Conjunctivae, EOM and lids are normal. Pupils are equal, round, and reactive to light.   Neck: Trachea normal. Neck supple.   Normal range of motion.  Cardiovascular:  Normal rate, regular rhythm, normal heart sounds and intact distal pulses.           Pulmonary/Chest:  Breath sounds normal.   Abdominal: Abdomen is soft. Bowel sounds are normal. She exhibits no distension. There is abdominal tenderness.   There is some mild tenderness in the epigastrium and left upper quadrant.   Musculoskeletal:         General: Normal range of motion.      Cervical back: Normal, normal range of motion and neck supple.      Lumbar back: Normal.     Neurological: She is alert and oriented to person, place, and time. She has normal strength. Coordination normal. GCS score is 15. GCS eye subscore is 4. GCS verbal subscore is 5. GCS motor subscore is 6.   Skin: Skin is warm, dry and intact. Capillary refill takes less than 2 seconds.   Psychiatric: She has a normal mood and affect. Her speech is normal and behavior is normal. Judgment and thought content normal. Cognition and memory are normal.         ED Course   Procedures  Labs Reviewed   COMPREHENSIVE METABOLIC PANEL - Abnormal; Notable for the following components:       Result Value    Sodium Level 130 (*)     Chloride 95 (*)     Glucose Level 171 (*)     Blood Urea Nitrogen 25.0 (*)     Creatinine 1.26 (*)     All other components within normal limits   CBC WITH DIFFERENTIAL - Abnormal; Notable for the following components:    WBC 12.48 (*)     Platelet 99 (*)     Neut # 8.12 (*)     Mono # 0.84 (*)     Baso # 0.11 (*)     IG# 0.22 (*)     IG% 1.8 (*)     All other components within normal limits   TROPONIN I - Normal   NT-PRO NATRIURETIC PEPTIDE - Normal   LIPASE - Normal   CBC W/ AUTO DIFFERENTIAL    Narrative:     The following orders were created for panel order CBC auto differential.  Procedure                               Abnormality         Status                     ---------                               -----------         ------                     CBC with Differential[[id number]]       Abnormal            Final result                 Please view results for these tests on the individual orders.        ECG Results              EKG  12-lead (Preliminary result)  Result time 02/23/24 15:03:06      Wet Read by Dangelo Ordoñez MD (02/23/24 15:03:06, [facility], Emergency Medicine)    Normal sinus rhythm with frequent PVCs, normal P waves, normal QRS, normal ST segments, normal T-waves, normal axis, normal intervals, normal QT.                                  Imaging Results              X-Ray Chest AP Portable (Final result)  Result time 02/23/24 15:37:14      Final result by Jose Robledo MD (02/23/24 15:37:14)                   Impression:      No lobar consolidation or evidence of acute cardiac decompensation.      Electronically signed by: Jose Robledo  Date:    02/23/2024  Time:    15:37               Narrative:    EXAMINATION:  XR CHEST AP PORTABLE    CLINICAL HISTORY:  Chest Pain;    TECHNIQUE:  Single frontal view of the chest was performed.    COMPARISON:  Radiograph 10/20/2023; CT 01/24/2024    FINDINGS:  The cardiomediastinal silhouette and pulmonary vasculature within normal limits.  Aortic vascular calcifications.  No lobar consolidation, pneumothorax or large pleural effusion.  No acute osseous abnormality identified.  Irregularity of the left clavicle suggesting remote fracture.                                       Medications   sodium chloride 0.9% bolus 1,000 mL 1,000 mL (1,000 mLs Intravenous New Bag 2/23/24 1554)   prochlorperazine injection Soln 5 mg (has no administration in time range)   famotidine (PF) injection 20 mg (20 mg Intravenous Given 2/23/24 1532)   hydrOXYzine injection 100 mg (100 mg Intramuscular Given 2/23/24 1532)     Medical Decision Making  Epigastric pain and tenderness in the epigastrium and left upper quadrant  Differential diagnosis: Gastritis, pancreatitis, cholecystitis, diverticulitis, constipation, IBS/anxiety  Pepcid, Vistaril, IV fluids  Labs for now    Amount and/or Complexity of Data Reviewed  Labs: ordered. Decision-making details documented in ED  Course.  Radiology: ordered. Decision-making details documented in ED Course.  Discussion of management or test interpretation with external provider(s): Patient was feeling considerably better.  Labs show mild dehydration, I will give for the entire L of IV fluids before she goes home.    Risk  Prescription drug management.               ED Course as of 02/23/24 1606   Fri Feb 23, 2024   1521 CBC auto differential(!)  Mild leukocytosis [TM]   1537 X-Ray Chest AP Portable  Unremarkable chest x-ray [TM]      ED Course User Index  [TM] Dangelo Ordoñez MD                           Clinical Impression:  Final diagnoses:  [R07.9] Chest pain  [R10.13] Epigastric abdominal pain (Primary)  [K29.00] Acute superficial gastritis without hemorrhage          ED Disposition Condition    Discharge Good          ED Prescriptions       Medication Sig Dispense Start Date End Date Auth. Provider    hydrOXYzine pamoate (VISTARIL) 50 MG Cap Take 1 capsule (50 mg total) by mouth every 6 (six) hours as needed (Nausea). 20 capsule 2/23/2024 -- Dangelo Ordoñez MD          Follow-up Information       Follow up With Specialties Details Why Contact Info    Jessie Srivastava NP Family Medicine Call in 3 days  [address] [facility] [address] [phone]               Dangelo Ordoñez MD  02/23/24 1606       Dangelo Ordoñez MD  02/23/24 1606

## 2024-02-27 ENCOUNTER — LAB VISIT (OUTPATIENT)
Dept: LAB | Facility: HOSPITAL | Age: 64
End: 2024-02-27
Attending: INTERNAL MEDICINE
Payer: MEDICAID

## 2024-02-27 DIAGNOSIS — G47.00 INSOMNIA WITH SLEEP APNEA: ICD-10-CM

## 2024-02-27 DIAGNOSIS — I10 HYPERTENSION, UNSPECIFIED TYPE: Primary | ICD-10-CM

## 2024-02-27 DIAGNOSIS — J44.9 VANISHING LUNG: ICD-10-CM

## 2024-02-27 DIAGNOSIS — G47.30 INSOMNIA WITH SLEEP APNEA: ICD-10-CM

## 2024-02-27 DIAGNOSIS — E11.9 DIABETES MELLITUS WITHOUT COMPLICATION: ICD-10-CM

## 2024-02-27 DIAGNOSIS — N18.9 CHRONIC KIDNEY DISEASE, UNSPECIFIED: ICD-10-CM

## 2024-02-27 LAB
ALBUMIN SERPL-MCNC: 4.6 G/DL (ref 3.4–5)
APPEARANCE UR: CLEAR
BASOPHILS # BLD AUTO: 0.11 X10(3)/MCL (ref 0.01–0.08)
BASOPHILS NFR BLD AUTO: 1 % (ref 0.1–1.2)
BILIRUB UR QL STRIP.AUTO: NEGATIVE
BUN SERPL-MCNC: 24 MG/DL (ref 7–20)
CALCIUM SERPL-MCNC: 9.2 MG/DL (ref 8.4–10.2)
CALCIUM SERPL-MCNC: 9.3 MG/DL (ref 8.4–10.2)
CHLORIDE SERPL-SCNC: 95 MMOL/L (ref 98–110)
CO2 SERPL-SCNC: 28 MMOL/L (ref 21–32)
COLOR UR AUTO: YELLOW
CREAT SERPL-MCNC: 1.06 MG/DL (ref 0.66–1.25)
CREAT UR-MCNC: 11 MG/DL
CREAT/UREA NIT SERPL: 23 (ref 12–20)
EOSINOPHIL # BLD AUTO: 0.18 X10(3)/MCL (ref 0.04–0.36)
EOSINOPHIL NFR BLD AUTO: 1.7 % (ref 0.7–7)
ERYTHROCYTE [DISTWIDTH] IN BLOOD BY AUTOMATED COUNT: 13.6 % (ref 11–14.5)
GFR SERPLBLD CREATININE-BSD FMLA CKD-EPI: 59 MLS/MIN/1.73/M2
GLUCOSE SERPL-MCNC: 137 MG/DL (ref 70–115)
GLUCOSE UR QL STRIP.AUTO: >=1000
HCT VFR BLD AUTO: 38.8 % (ref 36–48)
HGB BLD-MCNC: 13.1 G/DL (ref 11.8–16)
IMM GRANULOCYTES # BLD AUTO: 0.29 X10(3)/MCL (ref 0–0.03)
IMM GRANULOCYTES NFR BLD AUTO: 2.7 % (ref 0–0.5)
KETONES UR QL STRIP.AUTO: NEGATIVE
LEUKOCYTE ESTERASE UR QL STRIP.AUTO: NEGATIVE
LYMPHOCYTES # BLD AUTO: 2.41 X10(3)/MCL (ref 1.16–3.74)
LYMPHOCYTES NFR BLD AUTO: 22.8 % (ref 20–55)
MCH RBC QN AUTO: 28.7 PG (ref 27–34)
MCHC RBC AUTO-ENTMCNC: 33.8 G/DL (ref 31–37)
MCV RBC AUTO: 85.1 FL (ref 79–99)
MICROALBUMIN UR-MCNC: <5 UG/ML
MONOCYTES # BLD AUTO: 0.98 X10(3)/MCL (ref 0.24–0.36)
MONOCYTES NFR BLD AUTO: 9.3 % (ref 4.7–12.5)
NEUTROPHILS # BLD AUTO: 6.59 X10(3)/MCL (ref 1.56–6.13)
NEUTROPHILS NFR BLD AUTO: 62.5 % (ref 37–73)
NITRITE UR QL STRIP.AUTO: NEGATIVE
NRBC BLD AUTO-RTO: 0 %
PH UR STRIP.AUTO: 6 [PH]
PHOSPHATE SERPL-MCNC: 3.8 MG/DL (ref 2.5–4.9)
PLATELET # BLD AUTO: 117 X10(3)/MCL (ref 140–371)
PMV BLD AUTO: 11.7 FL (ref 9.4–12.4)
POTASSIUM SERPL-SCNC: 4.7 MMOL/L (ref 3.5–5.1)
PROT UR QL STRIP.AUTO: NEGATIVE
PROT UR STRIP-MCNC: 11 MG/DL
PTH-INTACT SERPL-MCNC: 133.2 PG/ML (ref 14–73)
RBC # BLD AUTO: 4.56 X10(6)/MCL (ref 4–5.1)
RBC UR QL AUTO: NEGATIVE
SODIUM SERPL-SCNC: 130 MMOL/L (ref 135–145)
SP GR UR STRIP.AUTO: 1.01 (ref 1–1.03)
UROBILINOGEN UR STRIP-ACNC: 0.2
WBC # SPEC AUTO: 10.56 X10(3)/MCL (ref 4–11.5)

## 2024-02-27 PROCEDURE — 84156 ASSAY OF PROTEIN URINE: CPT

## 2024-02-27 PROCEDURE — 80069 RENAL FUNCTION PANEL: CPT

## 2024-02-27 PROCEDURE — 81003 URINALYSIS AUTO W/O SCOPE: CPT

## 2024-02-27 PROCEDURE — 83970 ASSAY OF PARATHORMONE: CPT

## 2024-02-27 PROCEDURE — 85025 COMPLETE CBC W/AUTO DIFF WBC: CPT

## 2024-02-27 PROCEDURE — 36415 COLL VENOUS BLD VENIPUNCTURE: CPT

## 2024-02-27 PROCEDURE — 82043 UR ALBUMIN QUANTITATIVE: CPT

## 2024-02-27 PROCEDURE — 82570 ASSAY OF URINE CREATININE: CPT

## 2024-02-29 LAB
OHS QRS DURATION: 86 MS
OHS QTC CALCULATION: 444 MS

## 2024-03-04 ENCOUNTER — HOSPITAL ENCOUNTER (OUTPATIENT)
Dept: RADIOLOGY | Facility: HOSPITAL | Age: 64
Discharge: HOME | End: 2024-03-04
Attending: NURSE PRACTITIONER
Payer: MEDICAID

## 2024-03-04 DIAGNOSIS — M54.16 LUMBAR RADICULOPATHY: ICD-10-CM

## 2024-03-04 PROCEDURE — 72110 X-RAY EXAM L-2 SPINE 4/>VWS: CPT | Mod: TC

## 2024-04-16 ENCOUNTER — LAB VISIT (OUTPATIENT)
Dept: LAB | Facility: HOSPITAL | Age: 64
End: 2024-04-16
Attending: INTERNAL MEDICINE
Payer: MEDICAID

## 2024-04-16 DIAGNOSIS — D69.49 OTHER PRIMARY THROMBOCYTOPENIA: Primary | ICD-10-CM

## 2024-04-16 DIAGNOSIS — K75.81 NONALCOHOLIC STEATOHEPATITIS: ICD-10-CM

## 2024-04-16 LAB
ALBUMIN SERPL-MCNC: 4.5 G/DL (ref 3.4–5)
ALBUMIN/GLOB SERPL: 1.7 RATIO
ALP SERPL-CCNC: 87 UNIT/L (ref 50–144)
ALT SERPL-CCNC: 23 UNIT/L (ref 1–45)
ANION GAP SERPL CALC-SCNC: 9 MEQ/L (ref 2–13)
AST SERPL-CCNC: 24 UNIT/L (ref 14–36)
BASOPHILS # BLD AUTO: 0.11 X10(3)/MCL (ref 0.01–0.08)
BASOPHILS NFR BLD AUTO: 0.9 % (ref 0.1–1.2)
BILIRUB SERPL-MCNC: 0.4 MG/DL (ref 0–1)
BUN SERPL-MCNC: 20 MG/DL (ref 7–20)
CALCIUM SERPL-MCNC: 9.5 MG/DL (ref 8.4–10.2)
CHLORIDE SERPL-SCNC: 97 MMOL/L (ref 98–110)
CO2 SERPL-SCNC: 26 MMOL/L (ref 21–32)
CREAT SERPL-MCNC: 1.02 MG/DL (ref 0.66–1.25)
CREAT/UREA NIT SERPL: 20 (ref 12–20)
EOSINOPHIL # BLD AUTO: 0.17 X10(3)/MCL (ref 0.04–0.36)
EOSINOPHIL NFR BLD AUTO: 1.4 % (ref 0.7–7)
ERYTHROCYTE [DISTWIDTH] IN BLOOD BY AUTOMATED COUNT: 14.4 % (ref 11–14.5)
GFR SERPLBLD CREATININE-BSD FMLA CKD-EPI: 62 MLS/MIN/1.73/M2
GLOBULIN SER-MCNC: 2.6 GM/DL (ref 2–3.9)
GLUCOSE SERPL-MCNC: 138 MG/DL (ref 70–115)
HCT VFR BLD AUTO: 40.1 % (ref 36–48)
HGB BLD-MCNC: 13.7 G/DL (ref 11.8–16)
IMM GRANULOCYTES # BLD AUTO: 0.21 X10(3)/MCL (ref 0–0.03)
IMM GRANULOCYTES NFR BLD AUTO: 1.8 % (ref 0–0.5)
LYMPHOCYTES # BLD AUTO: 2.4 X10(3)/MCL (ref 1.16–3.74)
LYMPHOCYTES NFR BLD AUTO: 20.1 % (ref 20–55)
MCH RBC QN AUTO: 29 PG (ref 27–34)
MCHC RBC AUTO-ENTMCNC: 34.2 G/DL (ref 31–37)
MCV RBC AUTO: 84.8 FL (ref 79–99)
MONOCYTES # BLD AUTO: 1.02 X10(3)/MCL (ref 0.24–0.36)
MONOCYTES NFR BLD AUTO: 8.5 % (ref 4.7–12.5)
NEUTROPHILS # BLD AUTO: 8.02 X10(3)/MCL (ref 1.56–6.13)
NEUTROPHILS NFR BLD AUTO: 67.3 % (ref 37–73)
NRBC BLD AUTO-RTO: 0 %
PLATELET # BLD AUTO: 100 X10(3)/MCL (ref 140–371)
PMV BLD AUTO: 11.1 FL (ref 9.4–12.4)
POTASSIUM SERPL-SCNC: 4.6 MMOL/L (ref 3.5–5.1)
PROT SERPL-MCNC: 7.1 GM/DL (ref 6.3–8.2)
RBC # BLD AUTO: 4.73 X10(6)/MCL (ref 4–5.1)
SODIUM SERPL-SCNC: 132 MMOL/L (ref 135–145)
WBC # SPEC AUTO: 11.93 X10(3)/MCL (ref 4–11.5)

## 2024-04-16 PROCEDURE — 80053 COMPREHEN METABOLIC PANEL: CPT

## 2024-04-16 PROCEDURE — 36415 COLL VENOUS BLD VENIPUNCTURE: CPT

## 2024-04-16 PROCEDURE — 85025 COMPLETE CBC W/AUTO DIFF WBC: CPT

## 2024-04-16 PROCEDURE — 84165 PROTEIN E-PHORESIS SERUM: CPT

## 2024-04-17 LAB
ALBUMIN % SPEP (OHS): 51.76 (ref 48.1–59.5)
ALBUMIN SERPL BCP-MCNC: 3.4 G/DL
ALBUMIN/GLOB SERPL: 1.1 RATIO
ALPHA 1 GLOB (OHS): 0.29 GM/DL (ref 0–0.4)
ALPHA 1 GLOB% (OHS): 4.46 (ref 2.3–4.9)
ALPHA 2 GLOB % (OHS): 14.89 (ref 6.9–13)
ALPHA 2 GLOB (OHS): 0.98 GM/DL (ref 0.4–1)
BETA GLOB (OHS): 1.27 GM/DL (ref 0.7–1.3)
BETA GLOB% (OHS): 19.27 (ref 13.8–19.7)
GAMMA GLOBULIN % (OHS): 9.63 (ref 10.1–21.9)
GAMMA GLOBULIN (OHS): 0.64 GM/DL (ref 0.4–1.8)
GLOBULIN SER-MCNC: 3.2 GM/DL
M SPIKE % (OHS): ABNORMAL
M SPIKE (OHS): ABNORMAL
PATH REV: NORMAL
PROT SERPL-MCNC: 6.6 GM/DL (ref 5.8–7.6)

## 2024-05-09 ENCOUNTER — HOSPITAL ENCOUNTER (OUTPATIENT)
Dept: RADIOLOGY | Facility: HOSPITAL | Age: 64
Discharge: HOME | End: 2024-05-09
Attending: NURSE PRACTITIONER
Payer: MEDICAID

## 2024-05-09 DIAGNOSIS — M54.16 LUMBAR RADICULOPATHY: ICD-10-CM

## 2024-05-09 PROCEDURE — 72148 MRI LUMBAR SPINE W/O DYE: CPT | Mod: TC

## 2024-07-01 ENCOUNTER — LAB VISIT (OUTPATIENT)
Dept: LAB | Facility: HOSPITAL | Age: 64
End: 2024-07-01
Attending: NURSE PRACTITIONER
Payer: MEDICAID

## 2024-07-01 DIAGNOSIS — Z79.899 ENCOUNTER FOR LONG-TERM (CURRENT) USE OF OTHER MEDICATIONS: Primary | ICD-10-CM

## 2024-07-01 LAB
25(OH)D3+25(OH)D2 SERPL-MCNC: 34 NG/ML (ref 30–80)
ALBUMIN SERPL-MCNC: 4.3 G/DL (ref 3.4–5)
ALBUMIN/GLOB SERPL: 1.8 RATIO
ALP SERPL-CCNC: 77 UNIT/L (ref 50–144)
ALT SERPL-CCNC: 18 UNIT/L (ref 1–45)
ANION GAP SERPL CALC-SCNC: 6 MEQ/L (ref 2–13)
AST SERPL-CCNC: 20 UNIT/L (ref 14–36)
BASOPHILS # BLD AUTO: 0.1 X10(3)/MCL (ref 0.01–0.08)
BASOPHILS NFR BLD AUTO: 0.9 % (ref 0.1–1.2)
BILIRUB SERPL-MCNC: 0.3 MG/DL (ref 0–1)
BUN SERPL-MCNC: 14 MG/DL (ref 7–20)
CALCIUM SERPL-MCNC: 9.1 MG/DL (ref 8.4–10.2)
CHLORIDE SERPL-SCNC: 104 MMOL/L (ref 98–110)
CHOLEST SERPL-MCNC: 134 MG/DL (ref 0–200)
CO2 SERPL-SCNC: 27 MMOL/L (ref 21–32)
CREAT SERPL-MCNC: 0.92 MG/DL (ref 0.66–1.25)
CREAT/UREA NIT SERPL: 15 (ref 12–20)
EOSINOPHIL # BLD AUTO: 0.19 X10(3)/MCL (ref 0.04–0.36)
EOSINOPHIL NFR BLD AUTO: 1.6 % (ref 0.7–7)
ERYTHROCYTE [DISTWIDTH] IN BLOOD BY AUTOMATED COUNT: 15.3 % (ref 11–14.5)
GFR SERPLBLD CREATININE-BSD FMLA CKD-EPI: 70 ML/MIN/1.73/M2
GLOBULIN SER-MCNC: 2.4 GM/DL (ref 2–3.9)
GLUCOSE SERPL-MCNC: 118 MG/DL (ref 70–115)
HCT VFR BLD AUTO: 40.9 % (ref 36–48)
HDLC SERPL-MCNC: 39 MG/DL (ref 40–60)
HGB BLD-MCNC: 13.5 G/DL (ref 11.8–16)
IMM GRANULOCYTES # BLD AUTO: 0.11 X10(3)/MCL (ref 0–0.03)
IMM GRANULOCYTES NFR BLD AUTO: 0.9 % (ref 0–0.5)
LDLC SERPL DIRECT ASSAY-SCNC: 72.4 MG/DL (ref 30–100)
LYMPHOCYTES # BLD AUTO: 2.26 X10(3)/MCL (ref 1.16–3.74)
LYMPHOCYTES NFR BLD AUTO: 19.3 % (ref 20–55)
MAGNESIUM SERPL-MCNC: 2.5 MG/DL (ref 1.8–2.4)
MCH RBC QN AUTO: 29.1 PG (ref 27–34)
MCHC RBC AUTO-ENTMCNC: 33 G/DL (ref 31–37)
MCV RBC AUTO: 88.1 FL (ref 79–99)
MONOCYTES # BLD AUTO: 0.96 X10(3)/MCL (ref 0.24–0.36)
MONOCYTES NFR BLD AUTO: 8.2 % (ref 4.7–12.5)
NEUTROPHILS # BLD AUTO: 8.06 X10(3)/MCL (ref 1.56–6.13)
NEUTROPHILS NFR BLD AUTO: 69.1 % (ref 37–73)
NRBC BLD AUTO-RTO: 0 %
PLATELET # BLD AUTO: 84 X10(3)/MCL (ref 140–371)
PMV BLD AUTO: 11.3 FL (ref 9.4–12.4)
POTASSIUM SERPL-SCNC: 4.5 MMOL/L (ref 3.5–5.1)
PROT SERPL-MCNC: 6.7 GM/DL (ref 6.3–8.2)
RBC # BLD AUTO: 4.64 X10(6)/MCL (ref 4–5.1)
SODIUM SERPL-SCNC: 137 MMOL/L (ref 136–145)
TRIGL SERPL-MCNC: 197 MG/DL (ref 30–200)
TSH SERPL-ACNC: 0.91 UIU/ML (ref 0.36–3.74)
WBC # BLD AUTO: 11.68 X10(3)/MCL (ref 4–11.5)

## 2024-07-01 PROCEDURE — 80053 COMPREHEN METABOLIC PANEL: CPT

## 2024-07-01 PROCEDURE — 80061 LIPID PANEL: CPT

## 2024-07-01 PROCEDURE — 36415 COLL VENOUS BLD VENIPUNCTURE: CPT

## 2024-07-01 PROCEDURE — 83735 ASSAY OF MAGNESIUM: CPT

## 2024-07-01 PROCEDURE — 84443 ASSAY THYROID STIM HORMONE: CPT

## 2024-07-01 PROCEDURE — 85025 COMPLETE CBC W/AUTO DIFF WBC: CPT

## 2024-07-01 PROCEDURE — 82306 VITAMIN D 25 HYDROXY: CPT

## 2024-09-06 ENCOUNTER — LAB VISIT (OUTPATIENT)
Dept: LAB | Facility: HOSPITAL | Age: 64
End: 2024-09-06
Attending: INTERNAL MEDICINE
Payer: MEDICAID

## 2024-09-06 DIAGNOSIS — G47.00 INSOMNIA WITH SLEEP APNEA: Primary | ICD-10-CM

## 2024-09-06 DIAGNOSIS — N18.9 CHRONIC KIDNEY DISEASE, UNSPECIFIED: ICD-10-CM

## 2024-09-06 DIAGNOSIS — J44.9 VANISHING LUNG: ICD-10-CM

## 2024-09-06 DIAGNOSIS — I10 HYPERTENSION, UNSPECIFIED TYPE: ICD-10-CM

## 2024-09-06 DIAGNOSIS — G47.30 INSOMNIA WITH SLEEP APNEA: Primary | ICD-10-CM

## 2024-09-06 DIAGNOSIS — E87.1 HYPOSMOLALITY SYNDROME: ICD-10-CM

## 2024-09-06 DIAGNOSIS — E11.9 DIABETES MELLITUS WITHOUT COMPLICATION: ICD-10-CM

## 2024-09-06 LAB
ALBUMIN SERPL-MCNC: 4.8 G/DL (ref 3.4–5)
BASOPHILS # BLD AUTO: 0.05 X10(3)/MCL (ref 0.01–0.08)
BASOPHILS NFR BLD AUTO: 0.5 % (ref 0.1–1.2)
BILIRUB UR QL STRIP.AUTO: NEGATIVE
BUN SERPL-MCNC: 22 MG/DL (ref 7–20)
CALCIUM SERPL-MCNC: 10 MG/DL (ref 8.4–10.2)
CALCIUM SERPL-MCNC: 10.1 MG/DL (ref 8.4–10.2)
CHLORIDE SERPL-SCNC: 98 MMOL/L (ref 98–110)
CLARITY UR: CLEAR
CO2 SERPL-SCNC: 26 MMOL/L (ref 21–32)
COLOR UR AUTO: YELLOW
CREAT SERPL-MCNC: 0.97 MG/DL (ref 0.66–1.25)
CREAT UR-MCNC: 30.3 MG/DL (ref 45–106)
CREAT UR-MCNC: 31.6 MG/DL
CREAT/UREA NIT SERPL: 23 (ref 12–20)
EOSINOPHIL # BLD AUTO: 0.18 X10(3)/MCL (ref 0.04–0.36)
EOSINOPHIL NFR BLD AUTO: 1.7 % (ref 0.7–7)
ERYTHROCYTE [DISTWIDTH] IN BLOOD BY AUTOMATED COUNT: 14.2 % (ref 11–14.5)
GFR SERPLBLD CREATININE-BSD FMLA CKD-EPI: 65 ML/MIN/1.73/M2
GLUCOSE SERPL-MCNC: 123 MG/DL (ref 70–115)
GLUCOSE UR QL STRIP: >=1000
HCT VFR BLD AUTO: 46 % (ref 36–48)
HGB BLD-MCNC: 15.4 G/DL (ref 11.8–16)
HGB UR QL STRIP: NEGATIVE
IMM GRANULOCYTES # BLD AUTO: 0.15 X10(3)/MCL (ref 0–0.03)
IMM GRANULOCYTES NFR BLD AUTO: 1.4 % (ref 0–0.5)
KETONES UR QL STRIP: NEGATIVE
LEUKOCYTE ESTERASE UR QL STRIP: NEGATIVE
LYMPHOCYTES # BLD AUTO: 2.5 X10(3)/MCL (ref 1.16–3.74)
LYMPHOCYTES NFR BLD AUTO: 23.5 % (ref 20–55)
MCH RBC QN AUTO: 28.8 PG (ref 27–34)
MCHC RBC AUTO-ENTMCNC: 33.5 G/DL (ref 31–37)
MCV RBC AUTO: 86.1 FL (ref 79–99)
MICROALBUMIN UR-MCNC: <5 UG/ML
MICROALBUMIN/CREAT RATIO PNL UR: ABNORMAL
MONOCYTES # BLD AUTO: 0.89 X10(3)/MCL (ref 0.24–0.36)
MONOCYTES NFR BLD AUTO: 8.3 % (ref 4.7–12.5)
NEUTROPHILS # BLD AUTO: 6.89 X10(3)/MCL (ref 1.56–6.13)
NEUTROPHILS NFR BLD AUTO: 64.6 % (ref 37–73)
NITRITE UR QL STRIP: NEGATIVE
PH UR STRIP: 6 [PH]
PHOSPHATE SERPL-MCNC: 4.7 MG/DL (ref 2.5–4.9)
PLATELET # BLD AUTO: 90 X10(3)/MCL (ref 140–371)
PMV BLD AUTO: 11.8 FL (ref 9.4–12.4)
POTASSIUM SERPL-SCNC: 4.4 MMOL/L (ref 3.5–5.1)
PROT UR QL STRIP: NEGATIVE
PROT UR STRIP-MCNC: 7 MG/DL
PTH-INTACT SERPL-MCNC: 109.8 PG/ML (ref 14–73)
RBC # BLD AUTO: 5.34 X10(6)/MCL (ref 4–5.1)
SODIUM SERPL-SCNC: 135 MMOL/L (ref 136–145)
SP GR UR STRIP.AUTO: 1.01 (ref 1–1.03)
UROBILINOGEN UR STRIP-ACNC: 0.2
WBC # BLD AUTO: 10.66 X10(3)/MCL (ref 4–11.5)

## 2024-09-06 PROCEDURE — 81003 URINALYSIS AUTO W/O SCOPE: CPT

## 2024-09-06 PROCEDURE — 83970 ASSAY OF PARATHORMONE: CPT

## 2024-09-06 PROCEDURE — 82570 ASSAY OF URINE CREATININE: CPT

## 2024-09-06 PROCEDURE — 84156 ASSAY OF PROTEIN URINE: CPT

## 2024-09-06 PROCEDURE — 82330 ASSAY OF CALCIUM: CPT

## 2024-09-06 PROCEDURE — 80069 RENAL FUNCTION PANEL: CPT

## 2024-09-06 PROCEDURE — 36415 COLL VENOUS BLD VENIPUNCTURE: CPT

## 2024-09-06 PROCEDURE — 85025 COMPLETE CBC W/AUTO DIFF WBC: CPT

## 2024-09-07 LAB — CA-I ADJ PH7.4 SERPL ISE-MCNC: 4.83 MG/DL (ref 4.57–5.43)

## 2024-10-23 ENCOUNTER — OFFICE VISIT (OUTPATIENT)
Dept: OBSTETRICS AND GYNECOLOGY | Facility: CLINIC | Age: 64
End: 2024-10-23
Payer: MEDICAID

## 2024-10-23 VITALS
HEIGHT: 60 IN | SYSTOLIC BLOOD PRESSURE: 124 MMHG | TEMPERATURE: 97 F | WEIGHT: 159 LBS | BODY MASS INDEX: 31.22 KG/M2 | DIASTOLIC BLOOD PRESSURE: 86 MMHG

## 2024-10-23 DIAGNOSIS — Z01.411 ABNORMAL GYNECOLOGICAL EXAMINATION: Primary | ICD-10-CM

## 2024-10-23 DIAGNOSIS — N95.2 ATROPHIC VAGINITIS: ICD-10-CM

## 2024-10-23 DIAGNOSIS — Z12.31 SCREENING MAMMOGRAM FOR BREAST CANCER: ICD-10-CM

## 2024-10-23 DIAGNOSIS — B37.2 SKIN YEAST INFECTION: ICD-10-CM

## 2024-10-23 PROCEDURE — 99396 PREV VISIT EST AGE 40-64: CPT | Mod: ,,, | Performed by: NURSE PRACTITIONER

## 2024-10-23 PROCEDURE — 3074F SYST BP LT 130 MM HG: CPT | Mod: CPTII,,, | Performed by: NURSE PRACTITIONER

## 2024-10-23 PROCEDURE — 1159F MED LIST DOCD IN RCRD: CPT | Mod: CPTII,,, | Performed by: NURSE PRACTITIONER

## 2024-10-23 PROCEDURE — 3008F BODY MASS INDEX DOCD: CPT | Mod: CPTII,,, | Performed by: NURSE PRACTITIONER

## 2024-10-23 PROCEDURE — 3079F DIAST BP 80-89 MM HG: CPT | Mod: CPTII,,, | Performed by: NURSE PRACTITIONER

## 2024-10-23 PROCEDURE — 4010F ACE/ARB THERAPY RXD/TAKEN: CPT | Mod: CPTII,,, | Performed by: NURSE PRACTITIONER

## 2024-10-23 RX ORDER — NYSTATIN 100000 [USP'U]/G
POWDER TOPICAL 2 TIMES DAILY
Qty: 60 G | Refills: 1 | Status: SHIPPED | OUTPATIENT
Start: 2024-10-23

## 2024-10-23 RX ORDER — SEMAGLUTIDE 1.34 MG/ML
1 INJECTION, SOLUTION SUBCUTANEOUS
COMMUNITY
Start: 2024-10-02 | End: 2024-11-01

## 2024-10-23 NOTE — PROGRESS NOTES
Chief Complaint: Annual exam    Chief Complaint   Patient presents with    Well Woman     Annual, s/p Hyst. MMG 23 benign. C/O rash under breast and stomach x8 months.        HPI:   64 y.o. F  presents for an annual gyn exam.  S/p MAYUR, BSO  C/o intermittent rash to area under breasts and lower stomach area x 8 months.  + DM, reports glucose levels not well regulated.  Reports has lost 21 lbs with dietary changes recently.  Due for MMG.    Cancer-related family history includes Breast cancer in her mother, paternal aunt, and paternal grandmother. There is no history of Ovarian cancer, Uterine cancer, or Cervical cancer.       Labs / Significant Studies:  Gyn History:    Menstrual History  Cycle: No  Menarche Age: 9 years  No Cycle Reason: (!) Surgical  Surgical Reason: hysterectomy    Menopause  Menopause Age: 1 years  Post Menopausal Bleeding: No  Hormone Replacement Therapy: No    Pap History  HPV Vaccine Completed: N/a    [place]  Sexually Active: No  STI History: Yes  STI Type: Chlamydia, GC, HSV - Genital  Contraception: N/a    Breast History  Last Breast Imaging Date: Yes  Date: 23  History of Abnormal Breast Imaging : (!) Yes  History of Breast Biopsy: Yes (benign)    Family History   Problem Relation Name Age of Onset    Breast cancer Mother      Breast cancer Paternal Aunt      Breast cancer Paternal Grandmother      Ovarian cancer Neg Hx      Uterine cancer Neg Hx      Colon cancer Neg Hx      Cervical cancer Neg Hx           Past Medical History:   Diagnosis Date    Anxiety and depression     Bipolar disorder, unspecified     Chest pain     per CARDs: CORONARY MICROVASCULAR DYSFUNCTION    Chronic venous insufficiency     CKD (chronic kidney disease)     COPD (chronic obstructive pulmonary disease)     Diabetes mellitus, type 2     Diabetic peripheral neuropathy associated with type 2 diabetes mellitus     HLD (hyperlipidemia)     Hypertension     ARVIN (obstructive sleep apnea)      Osteoarthritis     PAD (peripheral artery disease)     w/NEUROGENIC CLAUDICATION    Personal history of colonic polyps     Joe Barnes MD     Past Surgical History:   Procedure Laterality Date    APPENDECTOMY      BILATERAL SALPINGO-OOPHORECTOMY (BSO)      BREAST BIOPSY Right 2000    Benign    COLONOSCOPY  2018    EXCISION-ADENOMA  2018    OPEN REDUCTION AND INTERNAL FIXATION (ORIF) OF FRACTURE OF DISTAL RADIUS Left 2023    Procedure: ORIF, FRACTURE, RADIUS, DISTAL;  Surgeon: Manny Mariscal MD;  Location: [facility];  Service: Orthopedics;  Laterality: Left;  supine, regular or bed with hand table, tourniquet, c-arm    TOTAL ABDOMINAL HYSTERECTOMY  1983       Current Outpatient Medications:     albuterol (PROVENTIL/VENTOLIN HFA) 90 mcg/actuation inhaler, SMARTSI Puff(s) Via Inhaler 4 Times Daily PRN, Disp: , Rfl:     ALLERGY RELIEF, LORATADINE, 10 mg tablet, Take 10 mg by mouth., Disp: , Rfl:     aspirin (ECOTRIN) 81 MG EC tablet, Take 81 mg by mouth once daily., Disp: , Rfl:     busPIRone (BUSPAR) 15 MG tablet, Take 7.5 mg by mouth 2 (two) times daily., Disp: , Rfl:     cetirizine (ZYRTEC) 10 MG tablet, Take 10 mg by mouth once daily., Disp: , Rfl:     citalopram (CELEXA) 20 MG tablet, Take 20 mg by mouth., Disp: , Rfl:     diclofenac (VOLTAREN) 50 MG EC tablet, Take 50 mg by mouth 2 (two) times daily., Disp: , Rfl:     diclofenac sodium (VOLTAREN) 1 % Gel, Apply 1 g topically., Disp: , Rfl:     DULoxetine (CYMBALTA) 20 MG capsule, Take 30 mg by mouth once daily., Disp: , Rfl:     empagliflozin (JARDIANCE) 10 mg tablet, Take 10 mg by mouth once daily., Disp: , Rfl:     fenofibrate (TRICOR) 145 MG tablet, Take 145 mg by mouth., Disp: , Rfl:     furosemide (LASIX) 20 MG tablet, Take 20 mg by mouth once daily., Disp: , Rfl:     gabapentin (NEURONTIN) 300 MG capsule, Take 300 mg by mouth 2 (two) times daily., Disp: , Rfl:     hydrOXYzine pamoate (VISTARIL) 50 MG Cap, Take 1 capsule (50 mg  total) by mouth every 6 (six) hours as needed (Nausea)., Disp: 20 capsule, Rfl: 0    INVOKANA 100 mg Tab tablet, TAKE 1 TABLET DAILY FOR DIABETES, Disp: , Rfl:     ipratropium (ATROVENT) 42 mcg (0.06 %) nasal spray, 2 sprays by Each Nostril route 4 (four) times daily., Disp: , Rfl:     isosorbide mononitrate (IMDUR) 30 MG 24 hr tablet, Take 30 mg by mouth once daily., Disp: , Rfl:     LAMICTAL 25 mg tablet, Take 50 mg by mouth once daily., Disp: , Rfl:     lisinopriL-hydrochlorothiazide (PRINZIDE,ZESTORETIC) 20-12.5 mg per tablet, Take 1 tablet by mouth once daily. FOR BLOOD PRESSURE, Disp: , Rfl:     melatonin 12 mg Tab, Take 1 tablet by mouth every evening., Disp: , Rfl:     metoprolol tartrate (LOPRESSOR) 50 MG tablet, Take 50 mg by mouth 2 (two) times daily., Disp: , Rfl:     nitroGLYCERIN (NITROSTAT) 0.4 MG SL tablet, Place 0.4 mg under the tongue., Disp: , Rfl:     OLANZapine (ZYPREXA) 20 MG tablet, Take 20 mg by mouth every evening., Disp: , Rfl:     omeprazole (PRILOSEC OTC) 20 MG tablet, Take 20 mg by mouth once daily., Disp: , Rfl:     OZEMPIC 1 mg/dose (4 mg/3 mL), Inject 1 mg into the skin., Disp: , Rfl:     pantoprazole (PROTONIX) 40 MG tablet, Take 40 mg by mouth once daily., Disp: , Rfl:     pioglitazone (ACTOS) 15 MG tablet, Take 15 mg by mouth every morning., Disp: , Rfl:     ranolazine (RANEXA) 500 MG Tb12, Take 500 mg by mouth 2 (two) times daily., Disp: , Rfl:     rosuvastatin (CRESTOR) 10 MG tablet, Take 40 mg by mouth once daily., Disp: , Rfl:     spironolactone (ALDACTONE) 25 MG tablet, Take 25 mg by mouth every morning., Disp: , Rfl:     sucralfate (CARAFATE) 1 gram tablet, Take 1 g by mouth 3 (three) times daily., Disp: , Rfl:     SYMBICORT 80-4.5 mcg/actuation HFAA, Inhale 1 puff into the lungs 2 (two) times daily., Disp: , Rfl:     TRUE METRIX GLUCOSE TEST STRIP Strp, SMARTSIG:Via Meter Daily, Disp: , Rfl:     vitamin D (VITAMIN D3) 1000 units Tab, Take 1,000 Units by mouth once daily.,  Disp: , Rfl:     nystatin (MYCOSTATIN) powder, Apply topically 2 (two) times daily., Disp: 60 g, Rfl: 1  No current facility-administered medications for this visit.    Facility-Administered Medications Ordered in Other Visits:     dextrose 10% bolus 125 mL 125 mL, 12.5 g, Intravenous, PRN, Osullivan-Saskia, Flor S, FNP    dextrose 10% bolus 125 mL 125 mL, 12.5 g, Intravenous, PRN, Osullivan-Saskia, Flor S, FNP    insulin aspart U-100 injection 2-9 Units, 2-9 Units, Subcutaneous, PRN, Osullivan-Saskia, Flor S, FNP    insulin aspart U-100 injection 4-12 Units, 4-12 Units, Subcutaneous, PRN, Osullivan-Saskia, Flor S, FNP    LIDOcaine (PF) 10 mg/ml (1%) injection 10 mg, 1 mL, Intradermal, Once, Osullivan-Saskia, Flor S, FNP    Review of patient's allergies indicates:   Allergen Reactions    Pcn [penicillins] Hives    Wool Itching    Shrimp Hives and Itching    Fluoxetine Hallucinations    Omeprazole Palpitations       Social History     Tobacco Use    Smoking status: Every Day     Current packs/day: 1.00     Average packs/day: 1 pack/day for 47.0 years (47.0 ttl pk-yrs)     Types: Cigarettes    Smokeless tobacco: Never   Substance Use Topics    Alcohol use: Never    Drug use: Never     Types: Marijuana     Comment: 40 years ago       Review of Systems:  General/Constitutional: Chills denies. Fatigue/weakness denies. Fever denies. Night sweats denies. Hot flashes denies    Respiratory: Cough denies. Hemoptysis denies. SOB denies. Sputum production denies. Wheezing denies .   Cardiovascular: Chest pain denies . Dizziness denies. Palpitations denies. Swelling in hands/feet denies    Gastrointestinal: Abdominal pain denies. Blood in stool denies. Constipation denies. Diarrhea denies. Heartburn denies. Nausea denies. Vomiting denies    Genitourinary: Incontinence denies. Blood in urine denies. Frequent urination denies. Painful urination denies. Urinary urgency denies. Nocturia denies    Gynecologic: Irregular menses denies. Heavy  bleeding denies. Painful menses denies. Vaginal discharge denies. Vaginal odor denies. Vaginal itching denies. Vaginal lesion denies. Pelvic pain denies. Decreased libido denies. Vulvar lesion denies. Prolapse of genital organs denies. Painful intercourse denies. Postcoital bleeding denies    Psychiatric: Depression denies. Anxiety denies     Physical Exam:   Vitals:    10/23/24 0856   BP: 124/86   Temp: 96.8 °F (36 °C)   Weight: 72.1 kg (159 lb)   Height: 5' (1.524 m)       Body mass index is 31.05 kg/m².       Chaperone: present.     General appearance: healthy, well-nourished and well-developed     Psychiatric: Orientation to time, place and person. Normal mood and affect and active, alert     Skin: Appearance: no rashes or lesions.     Neck:   Neck: supple, FROM, trachea midline. and no masses   Thyroid: no enlargement or nodules and non-tender.       Cardiovascular:   Auscultation: RRR and no murmur.   Peripheral Vascular: no varicosities, LLE edema, RLE edema, calf tenderness, and palpable cord and pedal pulses intact.     Lungs:   Respiratory effort: no intercostal retractions or accessory muscle usage.   Auscultation: no wheezing, rales/crackles, or rhonchi and clear to auscultation.     Breast:   Inspection/Palpation: no tenderness, discrete/distinct masses, skin changes, or abnormal secretions. Nipple appearance normal. Erythema noted to area on upper chest below breasts, R>L, no lesions or drainage    Abdomen:   Auscultation/Inspection/Palpation: no hepatomegaly, splenomegaly, masses, tenderness or CVA tenderness and soft, non-distended bowel sounds preset.    Hernia: no palpable hernias.     Female Genitalia:    Vulva: no masses, tenderness or lesions    Bladder/Urethra: no urethral discharge or mass, normal meatus, bladder non-distended.    Vagina: no tenderness, erythema, cystocele, rectocele, abnormal vaginal discharge or vesicle(s) or ulcers, cuff intact       Lymph Nodes:   Palpation: non tender  submandibular nodes, axillary nodes, or inguinal nodes.     Rectal Exam:   Rectum: normal perianal skin.       Assessment:     Patient Active Problem List   Diagnosis    Closed fracture of left radius and ulna    Acute pain of right shoulder    Hyponatremia    Constipation    Tobacco abuse    Chest pain    Rotator cuff syndrome, right    BMI 35.0-35.9,adult    Nontraumatic complete tear of right rotator cuff    Shoulder arthritis    Epigastric abdominal pain    Acute superficial gastritis without hemorrhage       Health Maintenance Due   Topic Date Due    Hepatitis C Screening  Never done    HIV Screening  Never done    TETANUS VACCINE  Never done    Shingles Vaccine (1 of 2) Never done    RSV Vaccine (Age 60+ and Pregnant patients) (1 - Risk 60-74 years 1-dose series) Never done    Colorectal Cancer Screening  03/29/2023    Influenza Vaccine (1) Never done    COVID-19 Vaccine (1 - 2024-25 season) Never done    Mammogram  09/08/2024     Health Maintenance Topics with due status: Not Due       Topic Last Completion Date    Hemoglobin A1c (Diabetic Prevention Screening) 07/31/2023    LDCT Lung Screen 01/24/2024    Lipid Panel 07/01/2024         Plan:    Adilia was seen today for well woman.    Diagnoses and all orders for this visit:    Abnormal gynecological examination    Reviewed calcium needs, exercise, and prevention of osteoporosis.  Healthy diet  Annual MMG  Discussed breast self-awareness  Colonoscopy q 10 yrs  Reviewed normal menopause and menopausal symptoms  Strongly encouraged Shingles vaccination  RTC 1 yr   Screening mammogram for breast cancer  -     Mammo Digital Screening Bilat w/ Yg; Future    Skin yeast infection  Nystatin powder bid x 7 days to affected area  -     nystatin (MYCOSTATIN) powder; Apply topically 2 (two) times daily.    Atrophic vaginitis  - Educated     - Lubricants, coconut oil, baby oil

## 2024-10-30 ENCOUNTER — HOSPITAL ENCOUNTER (OUTPATIENT)
Dept: RADIOLOGY | Facility: HOSPITAL | Age: 64
Discharge: HOME | End: 2024-10-30
Attending: NURSE PRACTITIONER
Payer: MEDICAID

## 2024-10-30 DIAGNOSIS — Z12.31 SCREENING MAMMOGRAM FOR BREAST CANCER: ICD-10-CM

## 2024-10-30 PROCEDURE — 77063 BREAST TOMOSYNTHESIS BI: CPT | Mod: TC

## 2024-10-30 PROCEDURE — 77067 SCR MAMMO BI INCL CAD: CPT | Mod: TC

## 2024-12-10 ENCOUNTER — LAB VISIT (OUTPATIENT)
Dept: LAB | Facility: HOSPITAL | Age: 64
End: 2024-12-10
Attending: INTERNAL MEDICINE
Payer: MEDICAID

## 2024-12-10 DIAGNOSIS — G47.00 INSOMNIA WITH SLEEP APNEA: Primary | ICD-10-CM

## 2024-12-10 DIAGNOSIS — J44.9 VANISHING LUNG: ICD-10-CM

## 2024-12-10 DIAGNOSIS — N18.9 CHRONIC KIDNEY DISEASE, UNSPECIFIED: ICD-10-CM

## 2024-12-10 DIAGNOSIS — E87.1 HYPOSMOLALITY SYNDROME: ICD-10-CM

## 2024-12-10 DIAGNOSIS — I10 HYPERTENSION, UNSPECIFIED TYPE: ICD-10-CM

## 2024-12-10 DIAGNOSIS — G47.30 INSOMNIA WITH SLEEP APNEA: Primary | ICD-10-CM

## 2024-12-10 DIAGNOSIS — E11.9 DIABETES MELLITUS WITHOUT COMPLICATION: ICD-10-CM

## 2024-12-10 LAB
ALBUMIN SERPL-MCNC: 4.3 G/DL (ref 3.4–5)
BASOPHILS # BLD AUTO: 0.11 X10(3)/MCL (ref 0.01–0.08)
BASOPHILS NFR BLD AUTO: 1 % (ref 0.1–1.2)
BILIRUB UR QL STRIP.AUTO: NEGATIVE
BUN SERPL-MCNC: 33 MG/DL (ref 7–20)
CALCIUM SERPL-MCNC: 9.3 MG/DL (ref 8.4–10.2)
CALCIUM SERPL-MCNC: 9.4 MG/DL (ref 8.4–10.2)
CHLORIDE SERPL-SCNC: 97 MMOL/L (ref 98–110)
CLARITY UR: CLEAR
CO2 SERPL-SCNC: 29 MMOL/L (ref 21–32)
COLOR UR AUTO: YELLOW
CREAT SERPL-MCNC: 1.38 MG/DL (ref 0.66–1.25)
CREAT UR-MCNC: 29 MG/DL (ref 45–106)
CREAT UR-MCNC: 30.7 MG/DL
CREAT/UREA NIT SERPL: 24 (ref 12–20)
EOSINOPHIL # BLD AUTO: 0.19 X10(3)/MCL (ref 0.04–0.36)
EOSINOPHIL NFR BLD AUTO: 1.7 % (ref 0.7–7)
ERYTHROCYTE [DISTWIDTH] IN BLOOD BY AUTOMATED COUNT: 14.2 % (ref 11–14.5)
GFR SERPLBLD CREATININE-BSD FMLA CKD-EPI: 43 ML/MIN/1.73/M2
GLUCOSE SERPL-MCNC: 100 MG/DL (ref 70–115)
GLUCOSE UR QL STRIP: >=1000
HCT VFR BLD AUTO: 38.2 % (ref 36–48)
HGB BLD-MCNC: 13.2 G/DL (ref 11.8–16)
HGB UR QL STRIP: NEGATIVE
IMM GRANULOCYTES # BLD AUTO: 0.26 X10(3)/MCL (ref 0–0.03)
IMM GRANULOCYTES NFR BLD AUTO: 2.4 % (ref 0–0.5)
KETONES UR QL STRIP: NEGATIVE
LEUKOCYTE ESTERASE UR QL STRIP: NEGATIVE
LYMPHOCYTES # BLD AUTO: 2.6 X10(3)/MCL (ref 1.16–3.74)
LYMPHOCYTES NFR BLD AUTO: 23.7 % (ref 20–55)
MCH RBC QN AUTO: 30.5 PG (ref 27–34)
MCHC RBC AUTO-ENTMCNC: 34.6 G/DL (ref 31–37)
MCV RBC AUTO: 88.2 FL (ref 79–99)
MICROALBUMIN UR-MCNC: <5 UG/ML
MICROALBUMIN/CREAT RATIO PNL UR: ABNORMAL
MONOCYTES # BLD AUTO: 0.93 X10(3)/MCL (ref 0.24–0.36)
MONOCYTES NFR BLD AUTO: 8.5 % (ref 4.7–12.5)
NEUTROPHILS # BLD AUTO: 6.88 X10(3)/MCL (ref 1.56–6.13)
NEUTROPHILS NFR BLD AUTO: 62.7 % (ref 37–73)
NITRITE UR QL STRIP: NEGATIVE
NRBC BLD AUTO-RTO: 0 %
PH UR STRIP: 6 [PH]
PHOSPHATE SERPL-MCNC: 4.4 MG/DL (ref 2.5–4.9)
PLATELET # BLD AUTO: 142 X10(3)/MCL (ref 140–371)
PMV BLD AUTO: 11.2 FL (ref 9.4–12.4)
POTASSIUM SERPL-SCNC: 5 MMOL/L (ref 3.5–5.1)
PROT UR QL STRIP: NEGATIVE
PROT UR STRIP-MCNC: 8 MG/DL
PTH-INTACT SERPL-MCNC: 94.9 PG/ML (ref 14–73)
RBC # BLD AUTO: 4.33 X10(6)/MCL (ref 4–5.1)
SODIUM SERPL-SCNC: 135 MMOL/L (ref 136–145)
SP GR UR STRIP.AUTO: 1.01 (ref 1–1.03)
UROBILINOGEN UR STRIP-ACNC: 0.2
WBC # BLD AUTO: 10.97 X10(3)/MCL (ref 4–11.5)

## 2024-12-10 PROCEDURE — 83970 ASSAY OF PARATHORMONE: CPT

## 2024-12-10 PROCEDURE — 80069 RENAL FUNCTION PANEL: CPT

## 2024-12-10 PROCEDURE — 82570 ASSAY OF URINE CREATININE: CPT

## 2024-12-10 PROCEDURE — 85025 COMPLETE CBC W/AUTO DIFF WBC: CPT

## 2024-12-10 PROCEDURE — 84156 ASSAY OF PROTEIN URINE: CPT

## 2024-12-10 PROCEDURE — 81003 URINALYSIS AUTO W/O SCOPE: CPT

## 2024-12-10 PROCEDURE — 82330 ASSAY OF CALCIUM: CPT

## 2024-12-10 PROCEDURE — 36415 COLL VENOUS BLD VENIPUNCTURE: CPT

## 2024-12-11 LAB — CA-I ADJ PH7.4 SERPL ISE-MCNC: 4.83 MG/DL (ref 4.57–5.43)

## 2025-01-03 ENCOUNTER — HOSPITAL ENCOUNTER (OUTPATIENT)
Facility: HOSPITAL | Age: 65
Discharge: HOME | End: 2025-01-04
Attending: SURGERY | Admitting: FAMILY MEDICINE
Payer: MEDICAID

## 2025-01-03 DIAGNOSIS — Z87.891 PERSONAL HISTORY OF TOBACCO USE, PRESENTING HAZARDS TO HEALTH: Primary | ICD-10-CM

## 2025-01-03 DIAGNOSIS — G45.9 TIA (TRANSIENT ISCHEMIC ATTACK): Primary | ICD-10-CM

## 2025-01-03 DIAGNOSIS — R53.81 MALAISE: ICD-10-CM

## 2025-01-03 LAB
ALBUMIN SERPL-MCNC: 4.8 G/DL (ref 3.4–5)
ALBUMIN/GLOB SERPL: 1.5 RATIO
ALP SERPL-CCNC: 78 UNIT/L (ref 50–144)
ALT SERPL-CCNC: 17 UNIT/L (ref 1–45)
ANION GAP SERPL CALC-SCNC: 7 MEQ/L (ref 2–13)
AST SERPL-CCNC: 29 UNIT/L (ref 14–36)
BASOPHILS # BLD AUTO: 0.06 X10(3)/MCL (ref 0.01–0.08)
BASOPHILS NFR BLD AUTO: 0.5 % (ref 0.1–1.2)
BILIRUB SERPL-MCNC: 0.5 MG/DL (ref 0–1)
BUN SERPL-MCNC: 21 MG/DL (ref 7–20)
CALCIUM SERPL-MCNC: 9.8 MG/DL (ref 8.4–10.2)
CHLORIDE SERPL-SCNC: 99 MMOL/L (ref 98–110)
CO2 SERPL-SCNC: 27 MMOL/L (ref 21–32)
CREAT SERPL-MCNC: 1.16 MG/DL (ref 0.66–1.25)
CREAT/UREA NIT SERPL: 18 (ref 12–20)
EOSINOPHIL # BLD AUTO: 0.08 X10(3)/MCL (ref 0.04–0.36)
EOSINOPHIL NFR BLD AUTO: 0.6 % (ref 0.7–7)
ERYTHROCYTE [DISTWIDTH] IN BLOOD BY AUTOMATED COUNT: 13.8 % (ref 11–14.5)
GFR SERPLBLD CREATININE-BSD FMLA CKD-EPI: 53 ML/MIN/1.73/M2
GLOBULIN SER-MCNC: 3.1 GM/DL (ref 2–3.9)
GLUCOSE SERPL-MCNC: 89 MG/DL (ref 70–115)
HCT VFR BLD AUTO: 41.2 % (ref 36–48)
HGB BLD-MCNC: 14.1 G/DL (ref 11.8–16)
IMM GRANULOCYTES # BLD AUTO: 0.1 X10(3)/MCL (ref 0–0.03)
IMM GRANULOCYTES NFR BLD AUTO: 0.8 % (ref 0–0.5)
LYMPHOCYTES # BLD AUTO: 2.11 X10(3)/MCL (ref 1.16–3.74)
LYMPHOCYTES NFR BLD AUTO: 17.1 % (ref 20–55)
MCH RBC QN AUTO: 30.5 PG (ref 27–34)
MCHC RBC AUTO-ENTMCNC: 34.2 G/DL (ref 31–37)
MCV RBC AUTO: 89 FL (ref 79–99)
MONOCYTES # BLD AUTO: 0.96 X10(3)/MCL (ref 0.24–0.36)
MONOCYTES NFR BLD AUTO: 7.8 % (ref 4.7–12.5)
NEUTROPHILS # BLD AUTO: 9.01 X10(3)/MCL (ref 1.56–6.13)
NEUTROPHILS NFR BLD AUTO: 73.2 % (ref 37–73)
PLATELET # BLD AUTO: 128 X10(3)/MCL (ref 140–371)
PMV BLD AUTO: 11.6 FL (ref 9.4–12.4)
POCT GLUCOSE: 84 MG/DL (ref 70–110)
POTASSIUM SERPL-SCNC: 4.3 MMOL/L (ref 3.5–5.1)
PROT SERPL-MCNC: 7.9 GM/DL (ref 6.3–8.2)
RBC # BLD AUTO: 4.63 X10(6)/MCL (ref 4–5.1)
SODIUM SERPL-SCNC: 133 MMOL/L (ref 136–145)
TROPONIN I SERPL-MCNC: <0.012 NG/ML (ref 0–0.03)
WBC # BLD AUTO: 12.32 X10(3)/MCL (ref 4–11.5)

## 2025-01-03 PROCEDURE — 96360 HYDRATION IV INFUSION INIT: CPT

## 2025-01-03 PROCEDURE — 85025 COMPLETE CBC W/AUTO DIFF WBC: CPT | Performed by: SURGERY

## 2025-01-03 PROCEDURE — 84484 ASSAY OF TROPONIN QUANT: CPT | Performed by: SURGERY

## 2025-01-03 PROCEDURE — 80053 COMPREHEN METABOLIC PANEL: CPT | Performed by: SURGERY

## 2025-01-03 PROCEDURE — 94761 N-INVAS EAR/PLS OXIMETRY MLT: CPT

## 2025-01-03 PROCEDURE — 93005 ELECTROCARDIOGRAM TRACING: CPT

## 2025-01-03 PROCEDURE — 25000242 PHARM REV CODE 250 ALT 637 W/ HCPCS: Performed by: SURGERY

## 2025-01-03 PROCEDURE — 94640 AIRWAY INHALATION TREATMENT: CPT

## 2025-01-03 PROCEDURE — 82962 GLUCOSE BLOOD TEST: CPT

## 2025-01-03 PROCEDURE — 99285 EMERGENCY DEPT VISIT HI MDM: CPT | Mod: 25

## 2025-01-03 PROCEDURE — 93010 ELECTROCARDIOGRAM REPORT: CPT | Mod: ,,, | Performed by: INTERNAL MEDICINE

## 2025-01-03 RX ORDER — IPRATROPIUM BROMIDE AND ALBUTEROL SULFATE 2.5; .5 MG/3ML; MG/3ML
3 SOLUTION RESPIRATORY (INHALATION) ONCE
Status: COMPLETED | OUTPATIENT
Start: 2025-01-03 | End: 2025-01-03

## 2025-01-03 RX ADMIN — IPRATROPIUM BROMIDE AND ALBUTEROL SULFATE 3 ML: 2.5; .5 SOLUTION RESPIRATORY (INHALATION) at 11:01

## 2025-01-04 VITALS
OXYGEN SATURATION: 97 % | WEIGHT: 154.88 LBS | SYSTOLIC BLOOD PRESSURE: 120 MMHG | TEMPERATURE: 98 F | DIASTOLIC BLOOD PRESSURE: 56 MMHG | RESPIRATION RATE: 18 BRPM | HEART RATE: 75 BPM | BODY MASS INDEX: 30.41 KG/M2 | HEIGHT: 60 IN

## 2025-01-04 PROBLEM — G45.9 TIA (TRANSIENT ISCHEMIC ATTACK): Status: ACTIVE | Noted: 2025-01-04

## 2025-01-04 LAB — POCT GLUCOSE: 104 MG/DL (ref 70–110)

## 2025-01-04 PROCEDURE — G0378 HOSPITAL OBSERVATION PER HR: HCPCS

## 2025-01-04 PROCEDURE — 99284 EMERGENCY DEPT VISIT MOD MDM: CPT | Mod: 95,,, | Performed by: PSYCHIATRY & NEUROLOGY

## 2025-01-04 PROCEDURE — 63600175 PHARM REV CODE 636 W HCPCS: Performed by: SURGERY

## 2025-01-04 PROCEDURE — 97161 PT EVAL LOW COMPLEX 20 MIN: CPT

## 2025-01-04 PROCEDURE — 25500020 PHARM REV CODE 255: Performed by: SURGERY

## 2025-01-04 PROCEDURE — 25000003 PHARM REV CODE 250

## 2025-01-04 RX ORDER — PIOGLITAZONEHYDROCHLORIDE 15 MG/1
15 TABLET ORAL EVERY MORNING
Status: DISCONTINUED | OUTPATIENT
Start: 2025-01-05 | End: 2025-01-04 | Stop reason: HOSPADM

## 2025-01-04 RX ORDER — RANOLAZINE 500 MG/1
500 TABLET, EXTENDED RELEASE ORAL 2 TIMES DAILY
Status: DISCONTINUED | OUTPATIENT
Start: 2025-01-04 | End: 2025-01-04 | Stop reason: HOSPADM

## 2025-01-04 RX ORDER — GLUCAGON 1 MG
1 KIT INJECTION
Status: DISCONTINUED | OUTPATIENT
Start: 2025-01-04 | End: 2025-01-04 | Stop reason: HOSPADM

## 2025-01-04 RX ORDER — IBUPROFEN 200 MG
16 TABLET ORAL
Status: DISCONTINUED | OUTPATIENT
Start: 2025-01-04 | End: 2025-01-04 | Stop reason: HOSPADM

## 2025-01-04 RX ORDER — TALC
12 POWDER (GRAM) TOPICAL NIGHTLY
Status: DISCONTINUED | OUTPATIENT
Start: 2025-01-04 | End: 2025-01-04 | Stop reason: HOSPADM

## 2025-01-04 RX ORDER — SODIUM CHLORIDE 0.9 % (FLUSH) 0.9 %
10 SYRINGE (ML) INJECTION
Status: DISCONTINUED | OUTPATIENT
Start: 2025-01-04 | End: 2025-01-04 | Stop reason: HOSPADM

## 2025-01-04 RX ORDER — METOPROLOL TARTRATE 50 MG/1
50 TABLET ORAL 2 TIMES DAILY
Status: DISCONTINUED | OUTPATIENT
Start: 2025-01-04 | End: 2025-01-04 | Stop reason: HOSPADM

## 2025-01-04 RX ORDER — CHOLECALCIFEROL (VITAMIN D3) 25 MCG
1000 TABLET ORAL DAILY
Status: DISCONTINUED | OUTPATIENT
Start: 2025-01-05 | End: 2025-01-04 | Stop reason: HOSPADM

## 2025-01-04 RX ORDER — FUROSEMIDE 20 MG/1
20 TABLET ORAL DAILY
Status: DISCONTINUED | OUTPATIENT
Start: 2025-01-05 | End: 2025-01-04 | Stop reason: HOSPADM

## 2025-01-04 RX ORDER — INSULIN ASPART 100 [IU]/ML
0-5 INJECTION, SOLUTION INTRAVENOUS; SUBCUTANEOUS
Status: DISCONTINUED | OUTPATIENT
Start: 2025-01-04 | End: 2025-01-04 | Stop reason: HOSPADM

## 2025-01-04 RX ORDER — LAMOTRIGINE 25 MG/1
50 TABLET ORAL DAILY
Status: DISCONTINUED | OUTPATIENT
Start: 2025-01-05 | End: 2025-01-04 | Stop reason: HOSPADM

## 2025-01-04 RX ORDER — IBUPROFEN 600 MG/1
600 TABLET ORAL EVERY 6 HOURS
Status: DISCONTINUED | OUTPATIENT
Start: 2025-01-04 | End: 2025-01-04 | Stop reason: HOSPADM

## 2025-01-04 RX ORDER — BUDESONIDE 0.5 MG/2ML
0.5 INHALANT ORAL EVERY 12 HOURS
Status: DISCONTINUED | OUTPATIENT
Start: 2025-01-04 | End: 2025-01-04 | Stop reason: HOSPADM

## 2025-01-04 RX ORDER — HYDROXYZINE PAMOATE 50 MG/1
50 CAPSULE ORAL EVERY 6 HOURS PRN
Status: DISCONTINUED | OUTPATIENT
Start: 2025-01-04 | End: 2025-01-04 | Stop reason: HOSPADM

## 2025-01-04 RX ORDER — HYDROCHLOROTHIAZIDE 12.5 MG/1
12.5 TABLET ORAL DAILY
Status: DISCONTINUED | OUTPATIENT
Start: 2025-01-05 | End: 2025-01-04 | Stop reason: HOSPADM

## 2025-01-04 RX ORDER — LABETALOL HCL 20 MG/4 ML
10 SYRINGE (ML) INTRAVENOUS EVERY 4 HOURS PRN
Status: DISCONTINUED | OUTPATIENT
Start: 2025-01-04 | End: 2025-01-04 | Stop reason: HOSPADM

## 2025-01-04 RX ORDER — GABAPENTIN 300 MG/1
300 CAPSULE ORAL 2 TIMES DAILY
Status: DISCONTINUED | OUTPATIENT
Start: 2025-01-04 | End: 2025-01-04 | Stop reason: HOSPADM

## 2025-01-04 RX ORDER — ASPIRIN 81 MG/1
81 TABLET ORAL DAILY
Status: DISCONTINUED | OUTPATIENT
Start: 2025-01-04 | End: 2025-01-04 | Stop reason: HOSPADM

## 2025-01-04 RX ORDER — ENOXAPARIN SODIUM 100 MG/ML
40 INJECTION SUBCUTANEOUS EVERY 24 HOURS
Status: DISCONTINUED | OUTPATIENT
Start: 2025-01-04 | End: 2025-01-04 | Stop reason: HOSPADM

## 2025-01-04 RX ORDER — ATORVASTATIN CALCIUM 40 MG/1
40 TABLET, FILM COATED ORAL DAILY
Status: DISCONTINUED | OUTPATIENT
Start: 2025-01-05 | End: 2025-01-04 | Stop reason: HOSPADM

## 2025-01-04 RX ORDER — BUSPIRONE HYDROCHLORIDE 7.5 MG/1
7.5 TABLET ORAL 2 TIMES DAILY
Status: DISCONTINUED | OUTPATIENT
Start: 2025-01-04 | End: 2025-01-04 | Stop reason: HOSPADM

## 2025-01-04 RX ORDER — ASPIRIN 81 MG/1
81 TABLET ORAL DAILY
Status: DISCONTINUED | OUTPATIENT
Start: 2025-01-05 | End: 2025-01-04 | Stop reason: SDUPTHER

## 2025-01-04 RX ORDER — CETIRIZINE HYDROCHLORIDE 10 MG/1
10 TABLET ORAL DAILY
Status: DISCONTINUED | OUTPATIENT
Start: 2025-01-05 | End: 2025-01-04 | Stop reason: HOSPADM

## 2025-01-04 RX ORDER — IBUPROFEN 200 MG
24 TABLET ORAL
Status: DISCONTINUED | OUTPATIENT
Start: 2025-01-04 | End: 2025-01-04 | Stop reason: HOSPADM

## 2025-01-04 RX ORDER — SPIRONOLACTONE 25 MG/1
25 TABLET ORAL EVERY MORNING
Status: DISCONTINUED | OUTPATIENT
Start: 2025-01-05 | End: 2025-01-04 | Stop reason: HOSPADM

## 2025-01-04 RX ORDER — CLOPIDOGREL BISULFATE 75 MG/1
75 TABLET ORAL DAILY
Qty: 21 TABLET | Refills: 0 | Status: SHIPPED | OUTPATIENT
Start: 2025-01-04 | End: 2025-01-25

## 2025-01-04 RX ORDER — CITALOPRAM 20 MG/1
20 TABLET, FILM COATED ORAL DAILY
Status: DISCONTINUED | OUTPATIENT
Start: 2025-01-05 | End: 2025-01-04 | Stop reason: HOSPADM

## 2025-01-04 RX ORDER — DULOXETIN HYDROCHLORIDE 30 MG/1
30 CAPSULE, DELAYED RELEASE ORAL DAILY
Status: DISCONTINUED | OUTPATIENT
Start: 2025-01-05 | End: 2025-01-04 | Stop reason: HOSPADM

## 2025-01-04 RX ORDER — NITROGLYCERIN 0.4 MG/1
0.4 TABLET SUBLINGUAL EVERY 5 MIN PRN
Status: DISCONTINUED | OUTPATIENT
Start: 2025-01-04 | End: 2025-01-04 | Stop reason: HOSPADM

## 2025-01-04 RX ORDER — ISOSORBIDE MONONITRATE 30 MG/1
30 TABLET, EXTENDED RELEASE ORAL DAILY
Status: DISCONTINUED | OUTPATIENT
Start: 2025-01-05 | End: 2025-01-04 | Stop reason: HOSPADM

## 2025-01-04 RX ORDER — ALBUTEROL SULFATE 0.83 MG/ML
2.5 SOLUTION RESPIRATORY (INHALATION) EVERY 4 HOURS PRN
Status: DISCONTINUED | OUTPATIENT
Start: 2025-01-04 | End: 2025-01-04 | Stop reason: HOSPADM

## 2025-01-04 RX ORDER — ALBUTEROL SULFATE 90 UG/1
2 INHALANT RESPIRATORY (INHALATION) EVERY 4 HOURS PRN
Status: DISCONTINUED | OUTPATIENT
Start: 2025-01-04 | End: 2025-01-04

## 2025-01-04 RX ORDER — FLUTICASONE FUROATE AND VILANTEROL 100; 25 UG/1; UG/1
1 POWDER RESPIRATORY (INHALATION) DAILY
Status: DISCONTINUED | OUTPATIENT
Start: 2025-01-05 | End: 2025-01-04

## 2025-01-04 RX ORDER — BISACODYL 10 MG/1
10 SUPPOSITORY RECTAL DAILY PRN
Status: DISCONTINUED | OUTPATIENT
Start: 2025-01-04 | End: 2025-01-04 | Stop reason: HOSPADM

## 2025-01-04 RX ORDER — PANTOPRAZOLE SODIUM 40 MG/1
40 TABLET, DELAYED RELEASE ORAL DAILY
Status: DISCONTINUED | OUTPATIENT
Start: 2025-01-05 | End: 2025-01-04 | Stop reason: HOSPADM

## 2025-01-04 RX ORDER — LISINOPRIL 10 MG/1
20 TABLET ORAL DAILY
Status: DISCONTINUED | OUTPATIENT
Start: 2025-01-05 | End: 2025-01-04 | Stop reason: HOSPADM

## 2025-01-04 RX ORDER — ARFORMOTEROL TARTRATE 15 UG/2ML
15 SOLUTION RESPIRATORY (INHALATION) 2 TIMES DAILY
Status: DISCONTINUED | OUTPATIENT
Start: 2025-01-04 | End: 2025-01-04 | Stop reason: HOSPADM

## 2025-01-04 RX ORDER — SUCRALFATE 1 G/1
1 TABLET ORAL 3 TIMES DAILY
Status: DISCONTINUED | OUTPATIENT
Start: 2025-01-04 | End: 2025-01-04 | Stop reason: HOSPADM

## 2025-01-04 RX ORDER — OLANZAPINE 5 MG/1
20 TABLET ORAL NIGHTLY
Status: DISCONTINUED | OUTPATIENT
Start: 2025-01-04 | End: 2025-01-04 | Stop reason: HOSPADM

## 2025-01-04 RX ORDER — FENOFIBRATE 145 MG/1
145 TABLET, FILM COATED ORAL DAILY
Status: DISCONTINUED | OUTPATIENT
Start: 2025-01-05 | End: 2025-01-04 | Stop reason: HOSPADM

## 2025-01-04 RX ADMIN — ASPIRIN 81 MG: 81 TABLET, COATED ORAL at 08:01

## 2025-01-04 RX ADMIN — IOHEXOL 100 ML: 350 INJECTION, SOLUTION INTRAVENOUS at 03:01

## 2025-01-04 RX ADMIN — SODIUM CHLORIDE, POTASSIUM CHLORIDE, SODIUM LACTATE AND CALCIUM CHLORIDE 500 ML: 600; 310; 30; 20 INJECTION, SOLUTION INTRAVENOUS at 12:01

## 2025-01-04 NOTE — SUBJECTIVE & OBJECTIVE
HPI:  64 y.o. female Referring Facility: [facility] Referring Provider: Devon Lee MD LKN:1/3/25 at 2030 Symptoms:Generalized weakness, headache, dizziness and blurred vision      Images personally reviewed and interpreted:  Study: Head CT  Study Interpretation: No acute intracranial process identified.      Assessment and plan:  DDX: TIA/Stroke/ Peripheral etiology          Oral aspirin 81 mg, if PO not possible then rectal aspirin 300 mg now.  Head of bed flat, IV Fluids, permissive hypertension  CTA head and neck with and without contrast.  Neuro consult if in house available or transfer for neuro consult  Stroke/TIA work-up with MRI brain, Echo, PT/OT/ Speech and swallow evaluation.  They will call me with CTA results if abnormal.  If CTA -ve for occlusion, recommend loading Plavix load 300 mg today per POINT/CHANCE protocol today and from tomorrow 81 mg aspirin and plavix 75 mg for 21 days followed by mono antiplatelet.      Lytics recommendation: Thrombolytic therapy not recommended due to Outside of treatment window     Thrombectomy recommendation: Awaiting CTA results from [facility] for determination   Placement recommendation: admit to inpatient

## 2025-01-04 NOTE — ED NOTES
Aaliyah THORNTON aware of patient and updated. Waiting for Telestroke consult at this time. Family remains at bedside and have been updated. Pt is sleeping at this time.

## 2025-01-04 NOTE — TELEMEDICINE CONSULT
[facility]  Vascular Neurology  Comprehensive Stroke Center  TeleVascular Neurology Acute Consultation Note        Consult Information  Consults    Consulting Provider: JED RAUSCH   Current Providers  No providers found    Patient Location:  [facility] EMERGENCY DEPARTMENT Emergency Department    Spoke hospital nurse at bedside with patient assisting consultant.  Patient information was obtained from patient.       Stroke Documentation  Acute Stroke Times   Last Known Normal Date: 01/03/25  Last Known Normal Time: 1600  Stroke Team Called Date: 01/04/25  Stroke Team Called Time: 0120  Stroke Team Arrival Date: 01/04/25  Stroke Team Arrival Time: 0120  Thrombolytic Therapy Recommended: No    NIH Scale:  1a. Level of Consciousness: 0-->Alert, keenly responsive  1b. LOC Questions: 0-->Answers both questions correctly  1c. LOC Commands: 0-->Performs both tasks correctly  2. Best Gaze: 0-->Normal  3. Visual: 0-->No visual loss  4. Facial Palsy: 0-->Normal symmetrical movements  5a. Motor Arm, Left: 0-->No drift, limb holds 90 (or 45) degrees for full 10 secs  5b. Motor Arm, Right: 0-->No drift, limb holds 90 (or 45) degrees for full 10 secs  6a. Motor Leg, Left: 0-->No drift, leg holds 30 degree position for full 5 secs  6b. Motor Leg, Right: 0-->No drift, leg holds 30 degree position for full 5 secs  7. Limb Ataxia: 0-->Absent  8. Sensory: 1-->Mild-to-moderate sensory loss, patient feels pinprick is less sharp or is dull on the affected side, or there is a loss of superficial pain with pinprick, but patient is aware of being touched  9. Best Language: 0-->No aphasia, normal  10. Dysarthria: 0-->Normal  11. Extinction and Inattention (formerly Neglect): 0-->No abnormality  Total (NIH Stroke Scale): 1      Modified Boundary:    Challis Coma Scale:     ABCD2 Score:    [id number] Score:    HAS -BLED Score:    ICH Score:    Hunt & Van Classification:      Blood pressure (!) 97/39, pulse 77,  temperature 97.6 °F (36.4 °C), temperature source Tympanic, resp. rate 16, height 5' (1.524 m), weight 70.3 kg (155 lb), SpO2 100%, not currently breastfeeding.      In my opinion, this was a: Tier 1; VAN Stroke Assessment: Negative     Medical Decision Making  HPI:  64 y.o. female Referring Facility: [facility] Referring Provider: Devon Lee MD LKN:1/3/25 at 2030 Symptoms:Generalized weakness, headache, dizziness and blurred vision      Images personally reviewed and interpreted:  Study: Head CT  Study Interpretation: No acute intracranial process identified.      Assessment and plan:  DDX: TIA/Stroke/ Peripheral etiology          Oral aspirin 81 mg, if PO not possible then rectal aspirin 300 mg now.  Head of bed flat, IV Fluids, permissive hypertension  CTA head and neck with and without contrast.  Neuro consult if in house available or transfer for neuro consult  Stroke/TIA work-up with MRI brain, Echo, PT/OT/ Speech and swallow evaluation.  They will call me with CTA results if abnormal.  If CTA -ve for occlusion, recommend loading Plavix load 300 mg today per POINT/CHANCE protocol today and from tomorrow 81 mg aspirin and plavix 75 mg for 21 days followed by mono antiplatelet.      Lytics recommendation: Thrombolytic therapy not recommended due to Outside of treatment window     Thrombectomy recommendation: Awaiting CTA results from Spoke for determination   Placement recommendation: admit to inpatient               ROS  Physical Exam  Past Medical History:   Diagnosis Date    Anxiety and depression     Bipolar disorder, unspecified     Chest pain     per CARDs: CORONARY MICROVASCULAR DYSFUNCTION    Chronic venous insufficiency     CKD (chronic kidney disease)     COPD (chronic obstructive pulmonary disease)     Diabetes mellitus, type 2     Diabetic peripheral neuropathy associated with type 2 diabetes mellitus     HLD (hyperlipidemia)     Hypertension     ARVIN (obstructive sleep apnea)      Osteoarthritis     PAD (peripheral artery disease)     w/NEUROGENIC CLAUDICATION    Personal history of colonic polyps     Joe Barnes MD     Past Surgical History:   Procedure Laterality Date    APPENDECTOMY      BILATERAL SALPINGO-OOPHORECTOMY (BSO)  2001    BREAST BIOPSY Right 2000    Benign    COLONOSCOPY  03/29/2018    EXCISION-ADENOMA  03/29/2018    OPEN REDUCTION AND INTERNAL FIXATION (ORIF) OF FRACTURE OF DISTAL RADIUS Left 01/26/2023    Procedure: ORIF, FRACTURE, RADIUS, DISTAL;  Surgeon: Manny Mariscal MD;  Location: [facility];  Service: Orthopedics;  Laterality: Left;  supine, regular or bed with hand table, tourniquet, c-arm    TOTAL ABDOMINAL HYSTERECTOMY  12/29/1983     Family History   Problem Relation Name Age of Onset    Breast cancer Mother      Breast cancer Paternal Aunt      Breast cancer Paternal Grandmother      Ovarian cancer Neg Hx      Uterine cancer Neg Hx      Colon cancer Neg Hx      Cervical cancer Neg Hx         Diagnoses  Stroke like symptoms    Hoda Garcia MD      Emergent/Acute neurological consultation requested by spoke provider due to critical concerns for possible cerebrovascular event that could result in permanent loss of neurologic/bodily function, severe disability or death of this patient.  Immediate/timely evaluation by a highly prepared expert is paramount for optimal outcomes  High risk for neurological deterioration if not properly diagnosed  High risk for neurological deterioration if not treated promplty/as soon as possible  Complex diagnostic evaluation may be required (advanced imaging)  High risk treatment options (thrombolytics and/or thrombectomy)    Patient care was coordinated with spoke provider, including but not limted to    Discussing likely diagnosis/etiology of symptoms  Making recommendations for further diagnostic studies  Making recommendations for intravenous thrombolytics or other advanced therapies  Making recommendations for disposition  (admission/transfer for higher level of care)      Neurology consultation requested by spoke provider. Audiovisual encounter with the patient performed using a secure connection.  Results and impressions from the visit are documented on this note and were communicated to the consulting provider/team via direct communication. The note has been shared for addition to the patients electronic medical record.

## 2025-01-04 NOTE — ED NOTES
Assumed care of this patient at this time. Pt receiving a breathing treatment upon entering room. Pt complaining of dizziness, blurred vision with nausea, shaking and a headache that started in her face and has moved to the top of her head 9/10 pain. Pt states she has had previous stroke with no deficits. [place] 4pm. States the new symptoms started around 730pm. Family at bedside. Pt also mentions increased difficulty walking over the last 2-3 months and she often has to stop and grab ahold of something to steady herself. NIH performed. Pt has decreased sensation on left side. Left arm ataxia. NIH =2. Pt does have some intermittent slurred speech. Dr Lee notified and head CT ordered.

## 2025-01-04 NOTE — H&P
[facility]  [facility] & Physical    Patient Name: Adilia Gregg  MRN: [id number]  Patient Class: OP- Observation  Admission Date: 1/3/2025  Attending Physician: Josefina Wynn MD  Primary Care Provider: Jessie Srivastava NP         Patient information was obtained from patient and ER records.     Subjective:     Principal Problem:TIA (transient ischemic attack)    Chief Complaint:   Chief Complaint   Patient presents with    Weakness     PT to ER C/O N/V, shaking, weakness, severe headache, and blurry vision prior to arrival.         HPI:   Weakness        PT to ER C/O N/V, shaking, weakness, severe headache, and blurry vision prior to arrival.       65 YO WF W/ +T2DM, HTN, HLD, Hx/o CVA (REMOTE), CONTINUED TOB ABUSE, ARVIN, PAD PRESENTING W/ URI Sx X 3 WEEKS W/ CONTINUED TOB ABUSE NOW W/ ACUTE ONSET FRONTAL CEPHALGIA (ATRAUMATIC) ASSOCIATED W/ GENERALIZED TREMOR & TRANSIENT BLURRED VISION.  NO MOTOR OR SENSORY DEFICIT.  +SERUM BG 84 mg/dL AT HOME FOLLOWING PARTIAL RESOLUTION OF Sx.  NO FC.  NO CP.  NO SOB.    Pt reports dizziness off and on x 3 weeks.  Was worse yesterday with some nausea so came to ER.  PT states symtpoms have now resolved.  NO weakness or tremor or pain.  No blurred vision.  NO motor or sensory deficits.  She is follwoed by Jessie Srivastava and has an apointment in a few days for a check up.  Pt was seen in ER and teleneurology done CT was negative for acute fidnings and CTA head and neck no significant blockages or narrowing.  Pt states she feels back to her normal self and asking for d/c home.  Denies any weakness, no speech or swallowing changes.    Past Medical History:   Diagnosis Date    Anxiety and depression     Bipolar disorder, unspecified     Chest pain     per CARDs: CORONARY MICROVASCULAR DYSFUNCTION    Chronic venous insufficiency     CKD (chronic kidney disease)     COPD (chronic obstructive pulmonary disease)     Diabetes mellitus, type 2      Diabetic peripheral neuropathy associated with type 2 diabetes mellitus     HLD (hyperlipidemia)     Hypertension     ARVIN (obstructive sleep apnea)     Osteoarthritis     PAD (peripheral artery disease)     w/NEUROGENIC CLAUDICATION    Personal history of colonic polyps     Joe Barnes MD       Past Surgical History:   Procedure Laterality Date    APPENDECTOMY      BILATERAL SALPINGO-OOPHORECTOMY (BSO)      BREAST BIOPSY Right 2000    Benign    COLONOSCOPY  2018    EXCISION-ADENOMA  2018    OPEN REDUCTION AND INTERNAL FIXATION (ORIF) OF FRACTURE OF DISTAL RADIUS Left 2023    Procedure: ORIF, FRACTURE, RADIUS, DISTAL;  Surgeon: Manny Mariscal MD;  Location: [facility];  Service: Orthopedics;  Laterality: Left;  supine, regular or bed with hand table, tourniquet, c-arm    TOTAL ABDOMINAL HYSTERECTOMY  1983       Review of patient's allergies indicates:   Allergen Reactions    Pcn [penicillins] Hives    Wool Itching    Shrimp Hives and Itching    Fluoxetine Hallucinations    Omeprazole Palpitations       Current Facility-Administered Medications on File Prior to Encounter   Medication    dextrose 10% bolus 125 mL 125 mL    dextrose 10% bolus 125 mL 125 mL    insulin aspart U-100 injection 2-9 Units    insulin aspart U-100 injection 4-12 Units    LIDOcaine (PF) 10 mg/ml (1%) injection 10 mg     Current Outpatient Medications on File Prior to Encounter   Medication Sig    albuterol (PROVENTIL/VENTOLIN HFA) 90 mcg/actuation inhaler SMARTSI Puff(s) Via Inhaler 4 Times Daily PRN    ALLERGY RELIEF, LORATADINE, 10 mg tablet Take 10 mg by mouth.    aspirin (ECOTRIN) 81 MG EC tablet Take 81 mg by mouth once daily.    busPIRone (BUSPAR) 15 MG tablet Take 7.5 mg by mouth 2 (two) times daily.    cetirizine (ZYRTEC) 10 MG tablet Take 10 mg by mouth once daily.    citalopram (CELEXA) 20 MG tablet Take 20 mg by mouth.    diclofenac (VOLTAREN) 50 MG EC tablet Take 50 mg by mouth 2 (two) times daily.     diclofenac sodium (VOLTAREN) 1 % Gel Apply 1 g topically.    DULoxetine (CYMBALTA) 20 MG capsule Take 30 mg by mouth once daily.    empagliflozin (JARDIANCE) 10 mg tablet Take 10 mg by mouth once daily.    fenofibrate (TRICOR) 145 MG tablet Take 145 mg by mouth.    furosemide (LASIX) 20 MG tablet Take 20 mg by mouth once daily.    gabapentin (NEURONTIN) 300 MG capsule Take 300 mg by mouth 2 (two) times daily.    INVOKANA 100 mg Tab tablet TAKE 1 TABLET DAILY FOR DIABETES    ipratropium (ATROVENT) 42 mcg (0.06 %) nasal spray 2 sprays by Each Nostril route 4 (four) times daily.    isosorbide mononitrate (IMDUR) 30 MG 24 hr tablet Take 30 mg by mouth once daily.    LAMICTAL 25 mg tablet Take 50 mg by mouth once daily.    lisinopriL-hydrochlorothiazide (PRINZIDE,ZESTORETIC) 20-12.5 mg per tablet Take 1 tablet by mouth once daily. FOR BLOOD PRESSURE    melatonin 12 mg Tab Take 1 tablet by mouth every evening.    metoprolol tartrate (LOPRESSOR) 50 MG tablet Take 50 mg by mouth 2 (two) times daily.    nystatin (MYCOSTATIN) powder Apply topically 2 (two) times daily.    OLANZapine (ZYPREXA) 20 MG tablet Take 20 mg by mouth every evening.    omeprazole (PRILOSEC OTC) 20 MG tablet Take 20 mg by mouth once daily.    pantoprazole (PROTONIX) 40 MG tablet Take 40 mg by mouth once daily.    pioglitazone (ACTOS) 15 MG tablet Take 15 mg by mouth every morning.    ranolazine (RANEXA) 500 MG Tb12 Take 500 mg by mouth 2 (two) times daily.    rosuvastatin (CRESTOR) 10 MG tablet Take 40 mg by mouth once daily.    spironolactone (ALDACTONE) 25 MG tablet Take 25 mg by mouth every morning.    sucralfate (CARAFATE) 1 gram tablet Take 1 g by mouth 3 (three) times daily.    SYMBICORT 80-4.5 mcg/actuation HFAA Inhale 1 puff into the lungs 2 (two) times daily.    vitamin D (VITAMIN D3) 1000 units Tab Take 1,000 Units by mouth once daily.    hydrOXYzine pamoate (VISTARIL) 50 MG Cap Take 1 capsule (50 mg total) by mouth every 6 (six) hours as  needed (Nausea).    nitroGLYCERIN (NITROSTAT) 0.4 MG SL tablet Place 0.4 mg under the tongue.    TRUE METRIX GLUCOSE TEST STRIP Strp SMARTSIG:Via Meter Daily     Family History       Problem Relation (Age of Onset)    Breast cancer Mother, Paternal Aunt, Paternal Grandmother          Tobacco Use    Smoking status: Every Day     Current packs/day: 1.00     Average packs/day: 1 pack/day for 47.0 years (47.0 ttl pk-yrs)     Types: Cigarettes    Smokeless tobacco: Never   Substance and Sexual Activity    Alcohol use: Never    Drug use: Never     Types: Marijuana     Comment: 40 years ago    Sexual activity: Not Currently     Partners: Male     Birth control/protection: See Surgical Hx     Review of Systems   Constitutional:  Negative for activity change, appetite change and fever.   Respiratory:  Negative for chest tightness, shortness of breath and wheezing.    Cardiovascular:  Negative for chest pain.   Gastrointestinal:  Negative for abdominal pain, constipation, diarrhea, nausea and vomiting.   Genitourinary:  Negative for dysuria.   Skin:  Negative for rash and wound.   Neurological:  Negative for tremors and headaches.     Objective:     Vital Signs (Most Recent):  Temp: 97.7 °F (36.5 °C) (01/04/25 1114)  Pulse: 75 (01/04/25 1114)  Resp: 18 (01/04/25 1114)  BP: (!) 120/56 (01/04/25 1114)  SpO2: 97 % (01/04/25 1114) Vital Signs (24h Range):  Temp:  [97.6 °F (36.4 °C)-97.7 °F (36.5 °C)] 97.7 °F (36.5 °C)  Pulse:  [62-88] 75  Resp:  [16-18] 18  SpO2:  [96 %-100 %] 97 %  BP: ()/(37-76) 120/56     Weight: 70.3 kg (154 lb 14.4 oz)  Body mass index is 30.25 kg/m².     Physical Exam  Vitals and nursing note reviewed. Exam conducted with a chaperone present.   Constitutional:       General: She is not in acute distress.     Appearance: Normal appearance. She is obese. She is not ill-appearing.   HENT:      Head: Normocephalic and atraumatic.   Eyes:      General: No scleral icterus.     Extraocular Movements:  Extraocular movements intact.   Cardiovascular:      Rate and Rhythm: Normal rate and regular rhythm.      Pulses: Normal pulses.      Heart sounds: Normal heart sounds.   Pulmonary:      Effort: Pulmonary effort is normal.      Breath sounds: Normal breath sounds.   Abdominal:      General: Abdomen is flat. Bowel sounds are normal.      Palpations: Abdomen is soft.   Skin:     General: Skin is warm and dry.      Capillary Refill: Capillary refill takes less than 2 seconds.      Findings: No erythema, lesion or rash.   Neurological:      General: No focal deficit present.      Mental Status: She is alert and oriented to person, place, and time.   Psychiatric:         Mood and Affect: Mood normal.         Behavior: Behavior normal.         Thought Content: Thought content normal.                Significant Labs: All pertinent labs within the past 24 hours have been reviewed.  CBC:   Recent Labs   Lab 01/03/25  2241   WBC 12.32*   HGB 14.1   HCT 41.2   *     CMP:   Recent Labs   Lab 01/03/25  2241   *   K 4.3   CL 99   CO2 27   BUN 21*   CREATININE 1.16   CALCIUM 9.8   ALBUMIN 4.8   BILITOT 0.5   ALKPHOS 78   AST 29   ALT 17       Significant Imaging: I have reviewed all pertinent imaging results/findings within the past 24 hours.  Assessment/Plan:     * TIA (transient ischemic attack)     Antithrombotics for secondary stroke prevention: Antiplatelets: Aspirin: 81 mg daily  Clopidogrel: 300 mg loading dose x 1, now  Clopidogrel: 75 mg daily    Statins for secondary stroke prevention and hyperlipidemia, if present:   Statins: Atorvastatin- 40 mg daily    Aggressive risk factor modification: HTN, DM, HLD     Rehab efforts: The patient has been evaluated by a stroke team provider and the therapy needs have been fully considered based off the presenting complaints and exam findings. The following therapy evaluations are needed: None    Diagnostics ordered/pending: CT scan of head without contrast to asses  brain parenchyma, CTA Head to assess vasculature , HgbA1C to assess blood glucose levels, Lipid Profile to assess cholesterol levels, TSH to assess thyroid function    VTE prophylaxis: Enoxaparin 40 mg SQ every 24 hours    BP parameters: Infarct: No intervention, SBP <220          VTE Risk Mitigation (From admission, onward)           Ordered     enoxaparin injection 40 mg  Daily         01/04/25 0536     IP VTE HIGH RISK PATIENT  Once         01/04/25 0536                       On 01/04/2025, patient should be placed in hospital observation services under my care.      Patient Screened for food insecurity, housing instability, transportation needs, utility difficulties, and interpersonal safety.  No needs identified         Josefina Wynn MD  Department of Hospital Medicine  [facility]/Surg

## 2025-01-04 NOTE — ED PROVIDER NOTES
Encounter Date: 1/3/2025       History     Chief Complaint   Patient presents with    Weakness     PT to ER C/O N/V, shaking, weakness, severe headache, and blurry vision prior to arrival.      63 YO WF W/ +T2DM, HTN, HLD, Hx/o CVA (REMOTE), CONTINUED TOB ABUSE, ARVIN, PAD PRESENTING W/ URI Sx X 3 WEEKS W/ CONTINUED TOB ABUSE NOW W/ ACUTE ONSET FRONTAL CEPHALGIA (ATRAUMATIC) ASSOCIATED W/ GENERALIZED TREMOR & TRANSIENT BLURRED VISION.  NO MOTOR OR SENSORY DEFICIT.  +SERUM BG 84 mg/dL AT HOME FOLLOWING PARTIAL RESOLUTION OF Sx.  NO FC.  NO CP.  NO SOB.          Review of patient's allergies indicates:   Allergen Reactions    Pcn [penicillins] Hives    Wool Itching    Shrimp Hives and Itching    Fluoxetine Hallucinations    Omeprazole Palpitations     Past Medical History:   Diagnosis Date    Anxiety and depression     Bipolar disorder, unspecified     Chest pain     per CARDs: CORONARY MICROVASCULAR DYSFUNCTION    Chronic venous insufficiency     CKD (chronic kidney disease)     COPD (chronic obstructive pulmonary disease)     Diabetes mellitus, type 2     Diabetic peripheral neuropathy associated with type 2 diabetes mellitus     HLD (hyperlipidemia)     Hypertension     ARVIN (obstructive sleep apnea)     Osteoarthritis     PAD (peripheral artery disease)     w/NEUROGENIC CLAUDICATION    Personal history of colonic polyps     Joe Barnes MD     Past Surgical History:   Procedure Laterality Date    APPENDECTOMY      BILATERAL SALPINGO-OOPHORECTOMY (BSO)  2001    BREAST BIOPSY Right 2000    Benign    COLONOSCOPY  03/29/2018    EXCISION-ADENOMA  03/29/2018    OPEN REDUCTION AND INTERNAL FIXATION (ORIF) OF FRACTURE OF DISTAL RADIUS Left 01/26/2023    Procedure: ORIF, FRACTURE, RADIUS, DISTAL;  Surgeon: Manny Mariscal MD;  Location: [facility];  Service: Orthopedics;  Laterality: Left;  supine, regular or bed with hand table, tourniquet, c-arm    TOTAL ABDOMINAL HYSTERECTOMY  12/29/1983     Family History   Problem Relation  Name Age of Onset    Breast cancer Mother      Breast cancer Paternal Aunt      Breast cancer Paternal Grandmother      Ovarian cancer Neg Hx      Uterine cancer Neg Hx      Colon cancer Neg Hx      Cervical cancer Neg Hx       Social History     Tobacco Use    Smoking status: Every Day     Current packs/day: 1.00     Average packs/day: 1 pack/day for 47.0 years (47.0 ttl pk-yrs)     Types: Cigarettes    Smokeless tobacco: Never   Substance Use Topics    Alcohol use: Never    Drug use: Never     Types: Marijuana     Comment: 40 years ago     Review of Systems   All other systems reviewed and are negative.      Physical Exam     Initial Vitals [01/03/25 2044]   BP Pulse Resp Temp SpO2   133/61 77 18 97.6 °F (36.4 °C) 98 %      MAP       --         Physical Exam    Nursing note and vitals reviewed.  Constitutional: She appears well-developed and well-nourished. No distress.   DISHEVELED     HENT:   Head: Normocephalic and atraumatic.   Right Ear: External ear normal.   Left Ear: External ear normal.   Nose: Nose normal. Mouth/Throat: Oropharynx is clear and moist. No oropharyngeal exudate.   Eyes: Conjunctivae and EOM are normal. Pupils are equal, round, and reactive to light. No scleral icterus.   Neck: Neck supple. No thyromegaly present. No tracheal deviation present. No JVD present.   Normal range of motion.  Cardiovascular:  Normal rate, regular rhythm, normal heart sounds and intact distal pulses.     Exam reveals no gallop.       No murmur heard.  Pulmonary/Chest: No stridor. No respiratory distress. She has wheezes. She has rhonchi. She has no rales. She exhibits no tenderness.   EXPIRATORY WHEEZES B R>>L  BILATERAL RHONCHI  NO RESPIRATORY DISTRESS     Abdominal: Abdomen is soft. Bowel sounds are normal. She exhibits no distension and no mass. There is no abdominal tenderness. There is no rebound and no guarding.   Musculoskeletal:         General: No tenderness or edema. Normal range of motion.      Cervical  back: Normal range of motion and neck supple.     Lymphadenopathy:     She has no cervical adenopathy.   Neurological: She is alert and oriented to person, place, and time. She has normal strength. No cranial nerve deficit or sensory deficit.   Skin: Skin is warm. Capillary refill takes less than 2 seconds.   Psychiatric: She has a normal mood and affect. Her behavior is normal. Judgment and thought content normal.         ED Course   Procedures  Labs Reviewed   COMPREHENSIVE METABOLIC PANEL - Abnormal       Result Value    Sodium 133 (*)     Potassium 4.3      Chloride 99      CO2 27      Glucose 89      Blood Urea Nitrogen 21 (*)     Creatinine 1.16      Calcium 9.8      Protein Total 7.9      Albumin 4.8      Globulin 3.1      Albumin/Globulin Ratio 1.5      Bilirubin Total 0.5      ALP 78      ALT 17      AST 29      eGFR 53      Anion Gap 7.0      BUN/Creatinine Ratio 18     CBC WITH DIFFERENTIAL - Abnormal    WBC 12.32 (*)     RBC 4.63      Hgb 14.1      Hct 41.2      MCV 89.0      MCH 30.5      MCHC 34.2      RDW 13.8      Platelet 128 (*)     MPV 11.6      Neut % 73.2 (*)     Lymph % 17.1 (*)     Mono % 7.8      Eos % 0.6 (*)     Basophil % 0.5      Lymph # 2.11      Neut # 9.01 (*)     Mono # 0.96 (*)     Eos # 0.08      Baso # 0.06      IG# 0.10 (*)     IG% 0.8 (*)    TROPONIN I - Normal    Troponin-I <0.012     CBC W/ AUTO DIFFERENTIAL    Narrative:     The following orders were created for panel order CBC Auto Differential.  Procedure                               Abnormality         Status                     ---------                               -----------         ------                     CBC with Differential[[id number]]       Abnormal            Final result                 Please view results for these tests on the individual orders.   POCT GLUCOSE    POCT Glucose 84     POCT GLUCOSE MONITORING CONTINUOUS     EKG Readings: (Independently Interpreted)   Previous EKG Date: 11/11/2023. Rhythm:  Sinus Bradycardia. Heart Rate: 58. Ectopy: No Ectopy. Conduction: Normal. ST Segments: Non-Specific ST Segment Depression. T Waves: Normal. Axis: Normal.   SINU BRADYCARDIA  NO ECTOPY  QRS WNL  QTc WNL  IL WNL    MARKED CHANGE FROM MOST RECENT EKG BUT UNCHANGED FROM SECOND MOST RECENT       Imaging Results              CTA Head and Neck (xpd) (Preliminary result)  Result time 01/04/25 04:35:30      Preliminary result by Blaine Diaz MD (01/04/25 04:35:30)                   Narrative:    START OF REPORT:  Technique: CT angiogram of the intracranial vessels was performed with intravenous contrast with direct axial as well as sagittal and coronal reformations. CT angiogram of the neck vessels was performed with intravenous contrast with direct axial as well as sagittal and coronal reformations.    Comparison: None.    Clinical history: Ams r/o stroke.    Findings:  Intracranial Vascular structures:  Internal carotid arteries: Unremarkable.  Middle cerebral arteries: Unremarkable.  Anterior cerebral arteries: Unremarkable.  Vertebral arteries: The left vertebral artery is dominant. The vertebral arteries otherwise appear unremarkable. The vertebrobasilar junction is unremarkable.  Basilar artery: Unremarkable.  Posterior cerebral arteries: Unremarkable.  Posterior communicating arteries: Unremarkable.  Jugular Veins and venous sinuses: Unremarkable.  Neck Vascular structures: Atheromatous calcification with no significant stenosis is seen at the origin of the right brachiocephalic left subclavian left common carotid arteries.  Carotids: The common and internal carotid arteries bilaterally appear to be within normal limits.  Vertebral arteries: Left vertebral artery is dominant.  Jugular Veins and venous sinuses: Unremarkable.  Brain parenchyma: No abnormal intracranial enhancement is seen on the post contrast images.      Impression:  1. No abnormal intracranial enhancement is seen on the post contrast images.  2.  Unremarkable CT angiogram of the head. Unremarkable CT angiogram of the neck. Details and other findings as described above.                                         CT Head Without Contrast (Preliminary result)  Result time 01/04/25 01:02:21      Preliminary result by Blaine Diaz MD (01/04/25 01:02:21)                   Narrative:    START OF REPORT:  Technique: CT of the head was performed without intravenous contrast with axial as well as coronal and sagittal images.    Comparison: None.    Dosage Information: Automated exposure control was utilized.    Clinical history: AMS neuro deficient.    Findings:  Hemorrhage: No acute intracranial hemorrhage is seen.  CSF spaces: The ventricles sulci and basal cisterns are within normal limits for age.  Brain parenchyma: Unremarkable with preservation of the grey white junction throughout. No acute infarct is identified.  Cerebellum: Unremarkable.  Vascular: Unremarkable venous sinuses. Subtle scattered atheromatous calcification of the intracranial arteries is seen.  Sella and skull base: The sella appears to be within normal limits for age.  Cerebellopontine angles: Within normal limits.  Herniation: None.  Intracranial calcifications: Incidental note is made of bilateral choroid plexus calcification. Incidental note is made of some pineal region calcification. Incidental note is made of some calcification of the falx.  Calvarium: No acute linear or depressed skull fracture is seen.    Maxillofacial Structures:  Paranasal sinuses: The visualized paranasal sinuses appear clear with no mucoperiosteal thickening or air fluid levels identified.  Orbits: The orbits appear unremarkable.  Zygomatic arches: The zygomatic arches are intact and unremarkable.  Temporal bones and mastoids: The temporal bones and mastoids appear unremarkable.  TMJ: The mandibular condyles appear normally placed with respect to the mandibular fossa.      Impression:  1. No acute intracranial  process identified. Details and other findings as noted above.                                         X-Ray Chest AP Portable (In process)                      Medications   sodium chloride 0.9% flush 10 mL (has no administration in time range)   labetalol 20 mg/4 mL (5 mg/mL) IV syring (has no administration in time range)   bisacodyL suppository 10 mg (has no administration in time range)   enoxaparin injection 40 mg (has no administration in time range)   aspirin EC tablet 81 mg (has no administration in time range)   albuterol-ipratropium 2.5 mg-0.5 mg/3 mL nebulizer solution 3 mL (3 mLs Nebulization Given 1/3/25 2334)   lactated ringers bolus 500 mL (0 mLs Intravenous Stopped 1/4/25 0115)   iohexoL (OMNIPAQUE 350) injection 100 mL (100 mLs Intravenous Given 1/4/25 0325)     Medical Decision Making  Amount and/or Complexity of Data Reviewed  Labs: ordered. Decision-making details documented in ED Course.  Radiology: ordered.  Discussion of management or test interpretation with external provider(s): Patient discussed with Dr. Schaefer.  Daytime hospitalist will see the patient, but he will tell them about her.  Orders and med rec completed.    Risk  OTC drugs.  Prescription drug management.  Decision regarding hospitalization.               ED Course as of 01/04/25 0602   Fri Jan 03, 2025   2309 BUN(!): 21 [WV]   2309 Sodium(!): 133 [WV]   2351 PATIENT REPORTING DECREASED SENSATION LEFT U/L EXT;  +CONTINUED BLURRED VISION, NAUSEA, NO EMESIS [WV]   Sat Jan 04, 2025   0107 Impression:  1. No acute intracranial process identified. Details and other findings as noted above.   [WV]   0227 NEUROLOGIST RECOMMENDS CTA, ADMISSION, FLUIDS & CONTINUATION OF ASA DAILY.  NO PLAVIX AT THIS TIME.  [facility] HAS CLOSED W/ PATIENT ADMITTING LAST KNOWN WELL AT 1630 YESTERDAY.   [WV]   0435 DR ART, CTA WNL [WV]      ED Course User Index  [WV] Devon Lee MD                           Clinical Impression:  Final  diagnoses:  [R53.81] Malaise  [G45.9] TIA (transient ischemic attack) (Primary)          ED Disposition Condition    Observation Stable                Dangelo Ordoñez MD  01/04/25 0602

## 2025-01-04 NOTE — PLAN OF CARE
Problem: Adult Inpatient Plan of Care  Goal: Plan of Care Review  Outcome: Met  Goal: Patient-Specific Goal (Individualized)  Outcome: Met  Goal: Absence of Hospital-Acquired Illness or Injury  Outcome: Met  Goal: Optimal Comfort and Wellbeing  Outcome: Met  Goal: Readiness for Transition of Care  Outcome: Met     Problem: Infection  Goal: Absence of Infection Signs and Symptoms  Outcome: Met     Problem: Stroke, Ischemic (Includes Transient Ischemic Attack)  Goal: Optimal Coping  Outcome: Met  Goal: Effective Bowel Elimination  Outcome: Met  Goal: Optimal Cerebral Tissue Perfusion  Outcome: Met  Goal: Optimal Cognitive Function  Outcome: Met  Goal: Improved Communication Skills  Outcome: Met  Goal: Optimal Functional Ability  Outcome: Met  Goal: Optimal Nutrition Intake  Outcome: Met  Goal: Effective Oxygenation and Ventilation  Outcome: Met  Goal: Improved Sensorimotor Function  Outcome: Met  Goal: Safe and Effective Swallow  Outcome: Met  Goal: Effective Urinary Elimination  Outcome: Met     Problem: Fall Injury Risk  Goal: Absence of Fall and Fall-Related Injury  Outcome: Met

## 2025-01-04 NOTE — PT/OT/SLP EVAL
"Physical Therapy Evaluation    Patient Name:  Adilia Gregg   MRN:  [id number]    Recommendations:     Discharge Recommendations: No Therapy Indicated   Discharge Equipment Recommendations:     Barriers to discharge:  medical condition    Assessment:     Adilia Gregg is a 64 y.o. female admitted with a medical diagnosis of TIA .  She presents with the following impairments/functional limitations: impaired balance, gait instability .    Assessment: Patient was pleasant and agreeable. Exhibits typical coordination of B upper extremities - no deficits observed at this time,  strength equal bilaterally. Patient demo'd supine<>sit independently. Per nurse, patient ambulated downstairs independently to smoke earlier this day. Patient demo'bibi L LE tremor during gait and stated "this happens sometimes" but tremor disappeared when patient was distracted by conversation (ambulated ~100ft with CGA). Patient appears to have good support system provided by her friend Audrey and is likely a good candidate to return home pending medical status.    Rehab Prognosis: Good; patient would benefit from acute skilled PT services to address these deficits and reach maximum level of function.    Recent Surgery: * No surgery found *      Plan:     During this hospitalization, patient to be seen 5 x/week to address the identified rehab impairments via gait training, therapeutic activities, therapeutic exercises and progress toward the following goals:    Plan of Care Expires:       Subjective     Chief Complaint: patient complaining of hunger at this time  Patient/Family Comments/goals: to go home  Pain/Comfort:  Pain Rating 1: 0/10    Patients cultural, spiritual, [employer] conflicts given the current situation:      Living Environment:  Patient lives in an apartment, was independent with mobility and ADLs.  .  Equipment used at home: none.  DME owned (not currently used): none.  Upon discharge, patient will have assistance from her " "friend Audrey.    Objective:     Communicated with nurse prior to session.  Patient found HOB elevated with    upon PT entry to room.    General Precautions: Standard,    Orthopedic Precautions:    Braces:    Respiratory Status: Room air    Exams:  RLE ROM: WFL  LLE ROM: WFL  Finger to nose WNL bilateral  Finger to tip WNL bilateral    Functional Mobility:  Bed Mobility:     Supine to Sit: independence  Sit to Supine: independence  Transfers:     Sit to Stand:  independence with no AD  Gait:  Per nurse, patient ambulated downstairs independently to smoke earlier this day. Patient demo'd L LE tremor during gait and stated "this happens sometimes" but tremor disappeared when patient was distracted by conversation (ambulated ~100ft with CGA)        Patient left HOB elevated with all lines intact, call button in reach, and nurse notified.    GOALS:   Multidisciplinary Problems       Physical Therapy Goals          Problem: Physical Therapy    Goal Priority Disciplines Outcome Interventions   Physical Therapy Goal     PT, PT/OT Progressing    Description: Goals to be met by: discharge     Patient will increase functional independence with mobility by performin. Gait  x 150 feet with [place] using No Assistive Device.                          History:     Past Medical History:   Diagnosis Date    Anxiety and depression     Bipolar disorder, unspecified     Chest pain     per CARDs: CORONARY MICROVASCULAR DYSFUNCTION    Chronic venous insufficiency     CKD (chronic kidney disease)     COPD (chronic obstructive pulmonary disease)     Diabetes mellitus, type 2     Diabetic peripheral neuropathy associated with type 2 diabetes mellitus     HLD (hyperlipidemia)     Hypertension     ARVIN (obstructive sleep apnea)     Osteoarthritis     PAD (peripheral artery disease)     w/NEUROGENIC CLAUDICATION    Personal history of colonic polyps     Joe Barnes MD       Past Surgical History:   Procedure Laterality Date    " APPENDECTOMY      BILATERAL SALPINGO-OOPHORECTOMY (BSO)  2001    BREAST BIOPSY Right 2000    Benign    COLONOSCOPY  03/29/2018    EXCISION-ADENOMA  03/29/2018    OPEN REDUCTION AND INTERNAL FIXATION (ORIF) OF FRACTURE OF DISTAL RADIUS Left 01/26/2023    Procedure: ORIF, FRACTURE, RADIUS, DISTAL;  Surgeon: Manny Mariscal MD;  Location: [facility];  Service: Orthopedics;  Laterality: Left;  supine, regular or bed with hand table, tourniquet, c-arm    TOTAL ABDOMINAL HYSTERECTOMY  12/29/1983       Time Tracking:     PT Received On: 01/04/25  PT Start Time: 1533     PT Stop Time: 1542  PT Total Time (min): 9 min     Billable Minutes: Evaluation 9      01/04/2025

## 2025-01-04 NOTE — HOSPITAL COURSE
01/04/2025 DISCHARGE SUMMARY: PT presented to [facility] ER with dizziness on and off x 3 weeks got worse on 01/03 came to ER was also associated with some nausea.  PT had neurology consult in ER recommended CT and CTA Head and neck which were all negative.  She was admitted for observation and given plavix and ASA, her symptoms have resolved, no pain, no weakness, no dysarthria or speech or swallowing dysfunction.  Pt motivated for d/c home and has a f/u with her PCP in 3 days.  She will be d/c home on plavix x 21  and ASA 81 mg dialy, would recommend high dose statin, however pt thinks she could not take them in the past and is currently on Tricor.  She will continue other home meds, encourage improved DM and HTN control and lipid management as tolerated.  [facility] will keep appointment on 01/08/2025 with her PCP Jessie, consider outpt MRI and echo cardiogram if the dizziness persists.

## 2025-01-04 NOTE — ASSESSMENT & PLAN NOTE
Antithrombotics for secondary stroke prevention: Antiplatelets: Aspirin: 81 mg daily  Clopidogrel: 300 mg loading dose x 1, now  Clopidogrel: 75 mg daily    Statins for secondary stroke prevention and hyperlipidemia, if present:   Statins: Atorvastatin- 40 mg daily    Aggressive risk factor modification: HTN, DM, HLD     Rehab efforts: The patient has been evaluated by a stroke team provider and the therapy needs have been fully considered based off the presenting complaints and exam findings. The following therapy evaluations are needed: None    Diagnostics ordered/pending: CT scan of head without contrast to asses brain parenchyma, CTA Head to assess vasculature , HgbA1C to assess blood glucose levels, Lipid Profile to assess cholesterol levels, TSH to assess thyroid function    VTE prophylaxis: Enoxaparin 40 mg SQ every 24 hours    BP parameters: Infarct: No intervention, SBP <220

## 2025-01-04 NOTE — SUBJECTIVE & OBJECTIVE
Past Medical History:   Diagnosis Date    Anxiety and depression     Bipolar disorder, unspecified     Chest pain     per CARDs: CORONARY MICROVASCULAR DYSFUNCTION    Chronic venous insufficiency     CKD (chronic kidney disease)     COPD (chronic obstructive pulmonary disease)     Diabetes mellitus, type 2     Diabetic peripheral neuropathy associated with type 2 diabetes mellitus     HLD (hyperlipidemia)     Hypertension     ARVIN (obstructive sleep apnea)     Osteoarthritis     PAD (peripheral artery disease)     w/NEUROGENIC CLAUDICATION    Personal history of colonic polyps     Joe Barnes MD       Past Surgical History:   Procedure Laterality Date    APPENDECTOMY      BILATERAL SALPINGO-OOPHORECTOMY (BSO)      BREAST BIOPSY Right 2000    Benign    COLONOSCOPY  2018    EXCISION-ADENOMA  2018    OPEN REDUCTION AND INTERNAL FIXATION (ORIF) OF FRACTURE OF DISTAL RADIUS Left 2023    Procedure: ORIF, FRACTURE, RADIUS, DISTAL;  Surgeon: Manny Mariscal MD;  Location: [facility];  Service: Orthopedics;  Laterality: Left;  supine, regular or bed with hand table, tourniquet, c-arm    TOTAL ABDOMINAL HYSTERECTOMY  1983       Review of patient's allergies indicates:   Allergen Reactions    Pcn [penicillins] Hives    Wool Itching    Shrimp Hives and Itching    Fluoxetine Hallucinations    Omeprazole Palpitations       Current Facility-Administered Medications on File Prior to Encounter   Medication    dextrose 10% bolus 125 mL 125 mL    dextrose 10% bolus 125 mL 125 mL    insulin aspart U-100 injection 2-9 Units    insulin aspart U-100 injection 4-12 Units    LIDOcaine (PF) 10 mg/ml (1%) injection 10 mg     Current Outpatient Medications on File Prior to Encounter   Medication Sig    albuterol (PROVENTIL/VENTOLIN HFA) 90 mcg/actuation inhaler SMARTSI Puff(s) Via Inhaler 4 Times Daily PRN    ALLERGY RELIEF, LORATADINE, 10 mg tablet Take 10 mg by mouth.    aspirin (ECOTRIN) 81 MG EC tablet Take 81  mg by mouth once daily.    busPIRone (BUSPAR) 15 MG tablet Take 7.5 mg by mouth 2 (two) times daily.    cetirizine (ZYRTEC) 10 MG tablet Take 10 mg by mouth once daily.    citalopram (CELEXA) 20 MG tablet Take 20 mg by mouth.    diclofenac (VOLTAREN) 50 MG EC tablet Take 50 mg by mouth 2 (two) times daily.    diclofenac sodium (VOLTAREN) 1 % Gel Apply 1 g topically.    DULoxetine (CYMBALTA) 20 MG capsule Take 30 mg by mouth once daily.    empagliflozin (JARDIANCE) 10 mg tablet Take 10 mg by mouth once daily.    fenofibrate (TRICOR) 145 MG tablet Take 145 mg by mouth.    furosemide (LASIX) 20 MG tablet Take 20 mg by mouth once daily.    gabapentin (NEURONTIN) 300 MG capsule Take 300 mg by mouth 2 (two) times daily.    INVOKANA 100 mg Tab tablet TAKE 1 TABLET DAILY FOR DIABETES    ipratropium (ATROVENT) 42 mcg (0.06 %) nasal spray 2 sprays by Each Nostril route 4 (four) times daily.    isosorbide mononitrate (IMDUR) 30 MG 24 hr tablet Take 30 mg by mouth once daily.    LAMICTAL 25 mg tablet Take 50 mg by mouth once daily.    lisinopriL-hydrochlorothiazide (PRINZIDE,ZESTORETIC) 20-12.5 mg per tablet Take 1 tablet by mouth once daily. FOR BLOOD PRESSURE    melatonin 12 mg Tab Take 1 tablet by mouth every evening.    metoprolol tartrate (LOPRESSOR) 50 MG tablet Take 50 mg by mouth 2 (two) times daily.    nystatin (MYCOSTATIN) powder Apply topically 2 (two) times daily.    OLANZapine (ZYPREXA) 20 MG tablet Take 20 mg by mouth every evening.    omeprazole (PRILOSEC OTC) 20 MG tablet Take 20 mg by mouth once daily.    pantoprazole (PROTONIX) 40 MG tablet Take 40 mg by mouth once daily.    pioglitazone (ACTOS) 15 MG tablet Take 15 mg by mouth every morning.    ranolazine (RANEXA) 500 MG Tb12 Take 500 mg by mouth 2 (two) times daily.    rosuvastatin (CRESTOR) 10 MG tablet Take 40 mg by mouth once daily.    spironolactone (ALDACTONE) 25 MG tablet Take 25 mg by mouth every morning.    sucralfate (CARAFATE) 1 gram tablet Take 1  g by mouth 3 (three) times daily.    SYMBICORT 80-4.5 mcg/actuation HFAA Inhale 1 puff into the lungs 2 (two) times daily.    vitamin D (VITAMIN D3) 1000 units Tab Take 1,000 Units by mouth once daily.    hydrOXYzine pamoate (VISTARIL) 50 MG Cap Take 1 capsule (50 mg total) by mouth every 6 (six) hours as needed (Nausea).    nitroGLYCERIN (NITROSTAT) 0.4 MG SL tablet Place 0.4 mg under the tongue.    TRUE METRIX GLUCOSE TEST STRIP Strp SMARTSIG:Via Meter Daily     Family History       Problem Relation (Age of Onset)    Breast cancer Mother, Paternal Aunt, Paternal Grandmother          Tobacco Use    Smoking status: Every Day     Current packs/day: 1.00     Average packs/day: 1 pack/day for 47.0 years (47.0 ttl pk-yrs)     Types: Cigarettes    Smokeless tobacco: Never   Substance and Sexual Activity    Alcohol use: Never    Drug use: Never     Types: Marijuana     Comment: 40 years ago    Sexual activity: Not Currently     Partners: Male     Birth control/protection: See Surgical Hx     Review of Systems   Constitutional:  Negative for activity change, appetite change and fever.   Respiratory:  Negative for chest tightness, shortness of breath and wheezing.    Cardiovascular:  Negative for chest pain.   Gastrointestinal:  Negative for abdominal pain, constipation, diarrhea, nausea and vomiting.   Genitourinary:  Negative for dysuria.   Skin:  Negative for rash and wound.   Neurological:  Negative for tremors and headaches.     Objective:     Vital Signs (Most Recent):  Temp: 97.7 °F (36.5 °C) (01/04/25 1114)  Pulse: 75 (01/04/25 1114)  Resp: 18 (01/04/25 1114)  BP: (!) 120/56 (01/04/25 1114)  SpO2: 97 % (01/04/25 1114) Vital Signs (24h Range):  Temp:  [97.6 °F (36.4 °C)-97.7 °F (36.5 °C)] 97.7 °F (36.5 °C)  Pulse:  [62-88] 75  Resp:  [16-18] 18  SpO2:  [96 %-100 %] 97 %  BP: ()/(37-76) 120/56     Weight: 70.3 kg (154 lb 14.4 oz)  Body mass index is 30.25 kg/m².     Physical Exam  Vitals and nursing note  reviewed. Exam conducted with a chaperone present.   Constitutional:       General: She is not in acute distress.     Appearance: Normal appearance. She is obese. She is not ill-appearing.   HENT:      Head: Normocephalic and atraumatic.   Eyes:      General: No scleral icterus.     Extraocular Movements: Extraocular movements intact.   Cardiovascular:      Rate and Rhythm: Normal rate and regular rhythm.      Pulses: Normal pulses.      Heart sounds: Normal heart sounds.   Pulmonary:      Effort: Pulmonary effort is normal.      Breath sounds: Normal breath sounds.   Abdominal:      General: Abdomen is flat. Bowel sounds are normal.      Palpations: Abdomen is soft.   Skin:     General: Skin is warm and dry.      Capillary Refill: Capillary refill takes less than 2 seconds.      Findings: No erythema, lesion or rash.   Neurological:      General: No focal deficit present.      Mental Status: She is alert and oriented to person, place, and time.   Psychiatric:         Mood and Affect: Mood normal.         Behavior: Behavior normal.         Thought Content: Thought content normal.                Significant Labs: All pertinent labs within the past 24 hours have been reviewed.  CBC:   Recent Labs   Lab 01/03/25  2241   WBC 12.32*   HGB 14.1   HCT 41.2   *     CMP:   Recent Labs   Lab 01/03/25  2241   *   K 4.3   CL 99   CO2 27   BUN 21*   CREATININE 1.16   CALCIUM 9.8   ALBUMIN 4.8   BILITOT 0.5   ALKPHOS 78   AST 29   ALT 17       Significant Imaging: I have reviewed all pertinent imaging results/findings within the past 24 hours.

## 2025-01-04 NOTE — DISCHARGE SUMMARY
[facility]/Surg  [facility] Medicine  Discharge Summary      Patient Name: Adilia Gregg  MRN: [id number]  ELIZ: [id number]  Patient Class: OP- Observation  Admission Date: 1/3/2025  Hospital Length of Stay: 0 days  Discharge Date and Time: No discharge date for patient encounter.  Attending Physician: Josefina Wynn MD   Discharging Provider: Josefina Wynn MD  Primary Care Provider: Jessie Srivastava NP    Primary Care Team: Networked reference to record PCT     HPI:   Weakness        PT to ER C/O N/V, shaking, weakness, severe headache, and blurry vision prior to arrival.       63 YO WF W/ +T2DM, HTN, HLD, Hx/o CVA (REMOTE), CONTINUED TOB ABUSE, ARVIN, PAD PRESENTING W/ URI Sx X 3 WEEKS W/ CONTINUED TOB ABUSE NOW W/ ACUTE ONSET FRONTAL CEPHALGIA (ATRAUMATIC) ASSOCIATED W/ GENERALIZED TREMOR & TRANSIENT BLURRED VISION.  NO MOTOR OR SENSORY DEFICIT.  +SERUM BG 84 mg/dL AT HOME FOLLOWING PARTIAL RESOLUTION OF Sx.  NO FC.  NO CP.  NO SOB.    Pt reports dizziness off and on x 3 weeks.  Was worse yesterday with some nausea so came to ER.  PT states symtpoms have now resolved.  NO weakness or tremor or pain.  No blurred vision.  NO motor or sensory deficits.  She is follwoed by Jessie Srivastava and has an apointment in a few days for a check up.  Pt was seen in ER and teleneurology done CT was negative for acute fidnings and CTA head and neck no significant blockages or narrowing.  Pt states she feels back to her normal self and asking for d/c home.  Denies any weakness, no speech or swallowing changes.    * No surgery found *      Hospital Course:   01/04/2025 DISCHARGE SUMMARY: PT presented to [facility] ER with dizziness on and off x 3 weeks got worse on 01/03 came to ER was also associated with some nausea.  PT had neurology consult in ER recommended CT and CTA Head and neck which were all negative.  She was admitted for observation and given plavix and ASA, her symptoms have resolved, no pain, no weakness,  no dysarthria or speech or swallowing dysfunction.  Pt motivated for d/c home and has a f/u with her PCP in 3 days.  She will be d/c home on plavix x 21  and ASA 81 mg dialy, would recommend high dose statin, however pt thinks she could not take them in the past and is currently on Tricor.  She will continue other home meds, encourage improved DM and HTN control and lipid management as tolerated.  Se will keep appointment on 01/08/2025 with her PCP Jessie, consider outpt MRI and echo cardiogram if the dizziness persists.     Goals of Care Treatment Preferences:  Code Status: Full Code      SDOH Screening:  The patient was screened for utility difficulties, food insecurity, transport difficulties, housing insecurity, and interpersonal safety and there were no concerns identified this admission.     Consults:   Consults (From admission, onward)          Status Ordering Provider     IP consult to case management/social work  Once        Provider:  (Not yet assigned)    Acknowledged VERÓNICA GILMORE     Inpatient consult to Hospitalist  Once        Provider:  (Not yet assigned)    Acknowledged JED RAUSCH            * TIA (transient ischemic attack)     Antithrombotics for secondary stroke prevention: Antiplatelets: Aspirin: 81 mg daily  Clopidogrel: 300 mg loading dose x 1, now  Clopidogrel: 75 mg daily    Statins for secondary stroke prevention and hyperlipidemia, if present:   Statins: Atorvastatin- 40 mg daily    Aggressive risk factor modification: HTN, DM, HLD     Rehab efforts: The patient has been evaluated by a stroke team provider and the therapy needs have been fully considered based off the presenting complaints and exam findings. The following therapy evaluations are needed: None    Diagnostics ordered/pending: CT scan of head without contrast to asses brain parenchyma, CTA Head to assess vasculature , HgbA1C to assess blood glucose levels, Lipid Profile to assess cholesterol levels, TSH to  assess thyroid function    VTE prophylaxis: Enoxaparin 40 mg SQ every 24 hours    BP parameters: Infarct: No intervention, SBP <220          Final Active Diagnoses:    Diagnosis Date Noted POA    PRINCIPAL PROBLEM:  TIA (transient ischemic attack) [G45.9] 2025 Yes      Problems Resolved During this Admission:       Discharged Condition: good    Disposition: Home or Self Care    Follow Up:   Follow-up Information       Jessie Srivastava NP Follow up.    Specialty: Family Medicine  Contact information:  [address] [place] [id number] [phone]                           Patient Instructions:      Activity as tolerated       Significant Diagnostic Studies: Labs: CMP   Recent Labs   Lab 25  2241   *   K 4.3   CL 99   CO2 27   BUN 21*   CREATININE 1.16   CALCIUM 9.8   ALBUMIN 4.8   BILITOT 0.5   ALKPHOS 78   AST 29   ALT 17    and CBC   Recent Labs   Lab 25  2241   WBC 12.32*   HGB 14.1   HCT 41.2   *       Pending Diagnostic Studies:       None           Medications:  Reconciled Home Medications:      Medication List        START taking these medications      clopidogreL 75 mg tablet  Commonly known as: PLAVIX  Take 1 tablet (75 mg total) by mouth once daily. for 21 days            CHANGE how you take these medications      diclofenac sodium 1 % Gel  Commonly known as: VOLTAREN  Apply 1 g topically.  What changed: Another medication with the same name was removed. Continue taking this medication, and follow the directions you see here.            CONTINUE taking these medications      albuterol 90 mcg/actuation inhaler  Commonly known as: PROVENTIL/VENTOLIN HFA  SMARTSI Puff(s) Via Inhaler 4 Times Daily PRN     ALLERGY RELIEF (LORATADINE) 10 mg tablet  Generic drug: loratadine  Take 10 mg by mouth.     aspirin 81 MG EC tablet  Commonly known as: ECOTRIN  Take 81 mg by mouth once daily.     busPIRone 15 MG tablet  Commonly known as: BUSPAR  Take 7.5 mg by mouth 2 (two) times  daily.     cetirizine 10 MG tablet  Commonly known as: ZYRTEC  Take 10 mg by mouth once daily.     citalopram 20 MG tablet  Commonly known as: CeleXA  Take 20 mg by mouth.     DULoxetine 20 MG capsule  Commonly known as: CYMBALTA  Take 30 mg by mouth once daily.     fenofibrate 145 MG tablet  Commonly known as: TRICOR  Take 145 mg by mouth.     furosemide 20 MG tablet  Commonly known as: LASIX  Take 20 mg by mouth once daily.     gabapentin 300 MG capsule  Commonly known as: NEURONTIN  Take 300 mg by mouth 2 (two) times daily.     hydrOXYzine pamoate 50 MG Cap  Commonly known as: VISTARIL  Take 1 capsule (50 mg total) by mouth every 6 (six) hours as needed (Nausea).     ipratropium 42 mcg (0.06 %) nasal spray  Commonly known as: ATROVENT  2 sprays by Each Nostril route 4 (four) times daily.     isosorbide mononitrate 30 MG 24 hr tablet  Commonly known as: IMDUR  Take 30 mg by mouth once daily.     JARDIANCE 10 mg tablet  Generic drug: empagliflozin  Take 10 mg by mouth once daily.     LaMICtal 25 mg tablet  Generic drug: lamoTRIgine  Take 50 mg by mouth once daily.     lisinopriL-hydrochlorothiazide 20-12.5 mg per tablet  Commonly known as: PRINZIDE,ZESTORETIC  Take 1 tablet by mouth once daily. FOR BLOOD PRESSURE     melatonin 12 mg Tab  Take 1 tablet by mouth every evening.     metoprolol tartrate 50 MG tablet  Commonly known as: LOPRESSOR  Take 50 mg by mouth 2 (two) times daily.     nitroGLYCERIN 0.4 MG SL tablet  Commonly known as: NITROSTAT  Place 0.4 mg under the tongue.     nystatin powder  Commonly known as: MYCOSTATIN  Apply topically 2 (two) times daily.     OLANZapine 20 MG tablet  Commonly known as: ZyPREXA  Take 20 mg by mouth every evening.     omeprazole 20 MG tablet  Commonly known as: PRILOSEC OTC  Take 20 mg by mouth once daily.     pantoprazole 40 MG tablet  Commonly known as: PROTONIX  Take 40 mg by mouth once daily.     pioglitazone 15 MG tablet  Commonly known as: ACTOS  Take 15 mg by mouth  every morning.     ranolazine 500 MG Tb12  Commonly known as: RANEXA  Take 500 mg by mouth 2 (two) times daily.     rosuvastatin 10 MG tablet  Commonly known as: CRESTOR  Take 40 mg by mouth once daily.     spironolactone 25 MG tablet  Commonly known as: ALDACTONE  Take 25 mg by mouth every morning.     sucralfate 1 gram tablet  Commonly known as: CARAFATE  Take 1 g by mouth 3 (three) times daily.     SYMBICORT 80-4.5 mcg/actuation Hfaa  Generic drug: budesonide-formoterol 80-4.5 mcg  Inhale 1 puff into the lungs 2 (two) times daily.     TRUE METRIX GLUCOSE TEST STRIP Strp  Generic drug: blood sugar diagnostic  SMARTSIG:Via Meter Daily     vitamin D 1000 units Tab  Commonly known as: VITAMIN D3  Take 1,000 Units by mouth once daily.            STOP taking these medications      INVOKANA 100 mg Tab tablet  Generic drug: canagliflozin              Indwelling Lines/Drains at time of discharge:   Lines/Drains/Airways       None                   Time spent on the discharge of patient: 37 minutes     Patient Screened for food insecurity, housing instability, transportation needs, utility difficulties, and interpersonal safety.  No needs identified    Physical Exam  Constitutional:       General: She is not in acute distress.     Appearance: Normal appearance. She is normal weight. She is not ill-appearing.   Cardiovascular:      Rate and Rhythm: Normal rate and regular rhythm.      Pulses: Normal pulses.      Heart sounds: Normal heart sounds.   Pulmonary:      Effort: Pulmonary effort is normal.      Breath sounds: Normal breath sounds.   Abdominal:      General: Abdomen is flat.      Palpations: Abdomen is soft.   Musculoskeletal:      Right lower leg: No edema.      Left lower leg: No edema.   Skin:     General: Skin is warm and dry.      Findings: No erythema, lesion or rash.   Neurological:      Mental Status: She is alert. Mental status is at baseline.      Sensory: No sensory deficit.      Motor: No weakness.       Coordination: Coordination normal.      Gait: Gait normal.      Deep Tendon Reflexes: Reflexes normal.   Psychiatric:         Behavior: Behavior normal.      Comments: I had a face to face encounter with this patient prior to d/c         Josefina Wynn MD  Department of Hospital Medicine  [facility]

## 2025-01-04 NOTE — HPI
Weakness        PT to ER C/O N/V, shaking, weakness, severe headache, and blurry vision prior to arrival.       65 YO WF W/ +T2DM, HTN, HLD, Hx/o CVA (REMOTE), CONTINUED TOB ABUSE, ARVIN, PAD PRESENTING W/ URI Sx X 3 WEEKS W/ CONTINUED TOB ABUSE NOW W/ ACUTE ONSET FRONTAL CEPHALGIA (ATRAUMATIC) ASSOCIATED W/ GENERALIZED TREMOR & TRANSIENT BLURRED VISION.  NO MOTOR OR SENSORY DEFICIT.  +SERUM BG 84 mg/dL AT HOME FOLLOWING PARTIAL RESOLUTION OF Sx.  NO FC.  NO CP.  NO SOB.    Pt reports dizziness off and on x 3 weeks.  Was worse yesterday with some nausea so came to ER.  PT states symtpoms have now resolved.  NO weakness or tremor or pain.  No blurred vision.  NO motor or sensory deficits.  She is follwoed by Jessie Srivastava and has an apointment in a few days for a check up.  Pt was seen in ER and teleneurology done CT was negative for acute fidnings and CTA head and neck no significant blockages or narrowing.  Pt states she feels back to her normal self and asking for d/c home.  Denies any weakness, no speech or swallowing changes.

## 2025-01-05 LAB
OHS QRS DURATION: 86 MS
OHS QTC CALCULATION: 418 MS

## 2025-02-04 ENCOUNTER — HOSPITAL ENCOUNTER (OUTPATIENT)
Dept: RADIOLOGY | Facility: HOSPITAL | Age: 65
Discharge: HOME | End: 2025-02-04
Attending: INTERNAL MEDICINE
Payer: MEDICARE

## 2025-02-04 DIAGNOSIS — Z87.891 PERSONAL HISTORY OF TOBACCO USE, PRESENTING HAZARDS TO HEALTH: ICD-10-CM

## 2025-02-04 PROCEDURE — 71271 CT THORAX LUNG CANCER SCR C-: CPT | Mod: TC

## 2025-03-28 ENCOUNTER — HOSPITAL ENCOUNTER (OUTPATIENT)
Dept: PREADMISSION TESTING | Facility: HOSPITAL | Age: 65
Discharge: HOME | End: 2025-03-28
Attending: FAMILY MEDICINE
Payer: MEDICARE

## 2025-03-28 VITALS — WEIGHT: 147 LBS | HEIGHT: 60 IN | BODY MASS INDEX: 28.86 KG/M2

## 2025-03-28 DIAGNOSIS — Z12.11 COLON CANCER SCREENING: Primary | ICD-10-CM

## 2025-03-28 DIAGNOSIS — Z12.11 SCREENING FOR COLON CANCER: ICD-10-CM

## 2025-03-28 RX ORDER — HYDROGEN PEROXIDE 3 %
20 SOLUTION, NON-ORAL MISCELLANEOUS
COMMUNITY

## 2025-03-28 NOTE — DISCHARGE INSTRUCTIONS

## 2025-04-03 ENCOUNTER — ANESTHESIA EVENT (OUTPATIENT)
Dept: GASTROENTEROLOGY | Facility: HOSPITAL | Age: 65
End: 2025-04-03
Payer: MEDICARE

## 2025-04-03 NOTE — ANESTHESIA PREPROCEDURE EVALUATION
04/03/2025  Adilia Gregg is a 65 y.o., female.  Anesthesia History    No specialty history recorded    Other Medical History   Personal history of colonic polyps Diabetes mellitus, type 2   Hypertension HLD (hyperlipidemia)   Bipolar disorder, unspecified Anxiety and depression   Diabetic peripheral neuropathy associated with type 2 diabetes mellitus PAD (peripheral artery disease)   Chronic venous insufficiency CKD (chronic kidney disease)   ARVIN (obstructive sleep apnea) Osteoarthritis   Chest pain COPD (chronic obstructive pulmonary disease)     Surgical History    COLONOSCOPY APPENDECTOMY   OPEN REDUCTION AND INTERNAL FIXATION (ORIF) OF FRACTURE OF DISTAL RADIUS EXCISION-ADENOMA   TOTAL ABDOMINAL HYSTERECTOMY BILATERAL SALPINGO-OOPHORECTOMY (BSO)   BREAST BIOPSY      COVID-19 Risk of Complications  Current as of yesterday    1 0 to < 3 Points: Low Risk   3 to < 6 Points: Medium Risk   6 to 16 Points: High Risk     No Change      Details   Substance History    Smoking Status: Every Day - 47 pack years   Smokeless Tobacco Status: Never   Alcohol use: Never   Drug use: Never     Anesthesia Family History    Problem Relations (Age of Onset)   No history of this type found      Current Gender Identity    Questions Responses   Patient's Gender Identity: Female   Patient's sex assigned at birth: Female          Pre-op Assessment    I have reviewed the Patient Summary Reports.     I have reviewed the Nursing Notes. I have reviewed the NPO Status.   I have reviewed the Medications.     Review of Systems  Anesthesia Hx:  No problems with previous Anesthesia             Denies Family Hx of Anesthesia complications.    Denies Personal Hx of Anesthesia complications.                    Social:  Smoker       Hematology/Oncology:  Hematology Normal   Oncology Normal                                    EENT/Dental:  EENT/Dental Normal           Cardiovascular:  Exercise tolerance: poor   Hypertension          PVD hyperlipidemia    Cleared per Dr Preston                           Pulmonary:   COPD     Sleep Apnea           Education provided regarding risk of obstructive sleep apnea            Renal/:  Chronic Renal Disease, CKD                Hepatic/GI:  Hepatic/GI Normal                    Musculoskeletal:  Arthritis               Neurological:  TIA,  Neuromuscular Disease,             Peripheral Neuropathy                          Endocrine:  Diabetes           Dermatological:  Skin Normal    Psych:  Psychiatric History   Bipolar               Physical Exam  General: Well nourished, Cooperative, Alert and Oriented    Airway:  Mallampati: II / II  Mouth Opening: Normal  TM Distance: Normal  Tongue: Normal  Neck ROM: Normal ROM    Dental:  Edentulous        Anesthesia Plan  Type of Anesthesia, risks & benefits discussed:    Anesthesia Type: MAC  Intra-op Monitoring Plan: Standard ASA Monitors  Post Op Pain Control Plan: multimodal analgesia  Induction:  IV  Airway Plan: Direct  Informed Consent: Informed consent signed with the Patient and all parties understand the risks and agree with anesthesia plan.  All questions answered. Patient consented to blood products? Yes  ASA Score: 3  Day of Surgery Review of History & Physical: H&P Update referred to the surgeon/provider.I have interviewed and examined the patient. I have reviewed the patient's H&P dated: There are no significant changes.     Ready For Surgery From Anesthesia Perspective.     .

## 2025-04-04 ENCOUNTER — HOSPITAL ENCOUNTER (OUTPATIENT)
Dept: GASTROENTEROLOGY | Facility: HOSPITAL | Age: 65
Discharge: HOME | End: 2025-04-04
Attending: FAMILY MEDICINE
Payer: MEDICARE

## 2025-04-04 ENCOUNTER — ANESTHESIA (OUTPATIENT)
Dept: GASTROENTEROLOGY | Facility: HOSPITAL | Age: 65
End: 2025-04-04
Payer: MEDICARE

## 2025-04-04 VITALS
BODY MASS INDEX: 28.71 KG/M2 | TEMPERATURE: 97 F | WEIGHT: 147 LBS | DIASTOLIC BLOOD PRESSURE: 46 MMHG | HEART RATE: 56 BPM | SYSTOLIC BLOOD PRESSURE: 91 MMHG | RESPIRATION RATE: 20 BRPM | OXYGEN SATURATION: 99 %

## 2025-04-04 DIAGNOSIS — Z12.11 SCREENING FOR COLON CANCER: ICD-10-CM

## 2025-04-04 DIAGNOSIS — Z12.11 COLON CANCER SCREENING: ICD-10-CM

## 2025-04-04 LAB — POCT GLUCOSE: 144 MG/DL (ref 70–110)

## 2025-04-04 PROCEDURE — 63600175 PHARM REV CODE 636 W HCPCS: Mod: JZ,TB | Performed by: NURSE ANESTHETIST, CERTIFIED REGISTERED

## 2025-04-04 PROCEDURE — G0121 COLON CA SCRN NOT HI RSK IND: HCPCS | Performed by: FAMILY MEDICINE

## 2025-04-04 PROCEDURE — 37000008 HC ANESTHESIA 1ST 15 MINUTES

## 2025-04-04 PROCEDURE — 63600175 PHARM REV CODE 636 W HCPCS: Performed by: FAMILY MEDICINE

## 2025-04-04 PROCEDURE — 37000009 HC ANESTHESIA EA ADD 15 MINS

## 2025-04-04 PROCEDURE — 82962 GLUCOSE BLOOD TEST: CPT

## 2025-04-04 RX ORDER — PROPOFOL 10 MG/ML
VIAL (ML) INTRAVENOUS
Status: DISCONTINUED | OUTPATIENT
Start: 2025-04-04 | End: 2025-04-04

## 2025-04-04 RX ORDER — ONDANSETRON HYDROCHLORIDE 2 MG/ML
INJECTION, SOLUTION INTRAVENOUS
Status: DISCONTINUED | OUTPATIENT
Start: 2025-04-04 | End: 2025-04-04

## 2025-04-04 RX ORDER — SODIUM CHLORIDE, SODIUM LACTATE, POTASSIUM CHLORIDE, CALCIUM CHLORIDE 600; 310; 30; 20 MG/100ML; MG/100ML; MG/100ML; MG/100ML
INJECTION, SOLUTION INTRAVENOUS CONTINUOUS
Status: DISCONTINUED | OUTPATIENT
Start: 2025-04-04 | End: 2025-04-05 | Stop reason: HOSPADM

## 2025-04-04 RX ORDER — LIDOCAINE HYDROCHLORIDE 20 MG/ML
INJECTION, SOLUTION EPIDURAL; INFILTRATION; INTRACAUDAL; PERINEURAL
Status: DISCONTINUED | OUTPATIENT
Start: 2025-04-04 | End: 2025-04-04

## 2025-04-04 RX ORDER — GLYCOPYRROLATE 0.2 MG/ML
INJECTION INTRAMUSCULAR; INTRAVENOUS
Status: DISCONTINUED | OUTPATIENT
Start: 2025-04-04 | End: 2025-04-04

## 2025-04-04 RX ADMIN — PROPOFOL 50 MG: 10 INJECTION, EMULSION INTRAVENOUS at 09:04

## 2025-04-04 RX ADMIN — GLYCOPYRROLATE 0.2 MG: 0.2 INJECTION INTRAMUSCULAR; INTRAVENOUS at 08:04

## 2025-04-04 RX ADMIN — LIDOCAINE HYDROCHLORIDE 5 ML: 20 INJECTION, SOLUTION EPIDURAL; INFILTRATION; INTRACAUDAL; PERINEURAL at 08:04

## 2025-04-04 RX ADMIN — PROPOFOL 50 MG: 10 INJECTION, EMULSION INTRAVENOUS at 08:04

## 2025-04-04 RX ADMIN — SODIUM CHLORIDE, POTASSIUM CHLORIDE, SODIUM LACTATE AND CALCIUM CHLORIDE: 600; 310; 30; 20 INJECTION, SOLUTION INTRAVENOUS at 09:04

## 2025-04-04 RX ADMIN — SODIUM CHLORIDE, POTASSIUM CHLORIDE, SODIUM LACTATE AND CALCIUM CHLORIDE: 600; 310; 30; 20 INJECTION, SOLUTION INTRAVENOUS at 08:04

## 2025-04-04 RX ADMIN — PROPOFOL 100 MG: 10 INJECTION, EMULSION INTRAVENOUS at 08:04

## 2025-04-04 RX ADMIN — ONDANSETRON 4 MG: 2 INJECTION INTRAMUSCULAR; INTRAVENOUS at 08:04

## 2025-04-04 RX ADMIN — SODIUM CHLORIDE, POTASSIUM CHLORIDE, SODIUM LACTATE AND CALCIUM CHLORIDE: 600; 310; 30; 20 INJECTION, SOLUTION INTRAVENOUS at 07:04

## 2025-04-04 NOTE — DISCHARGE INSTRUCTIONS
Follow-up with Dr Barnes  Diet: as tolerated  Activity:  decrease activity today, no driving today, resume all activity tomorrow  Notify MD:  increased swelling of abdomen, excessive nausea/vomiting, excessive bright red bleeding from rectum  Medications:  continue your home medications. Keep a list of your home medications at all times for emergencies.

## 2025-04-04 NOTE — PLAN OF CARE
PT IS BACK TO ROOM, AWAKE AND ALERT, IN STABLE CONDITION, NO DIFFICULTY BREATHING OR SWALLOWING, NO RECTAL BLEEDING NOTED, + FLATUS NOTED, PT IS TOLERATING ICE CHIPS AND COFFEE, FAMILY AT SIDE, SR X2, CB IN REACH. FREQUENT VITALS IN PROGRESS. DR. BARTH MADE ROUNDS. PT HYPOTENSIVE, FLUIDS INFUSING. NO NEW ORDERS. WILL CONT TO MONITOR

## 2025-04-04 NOTE — PLAN OF CARE
Discharge instructions provided to patient and family, allowed time for questions, verbalized understanding.    Discharged home in stable condition via wheel chair, accompanied by family, no s/s of distress noted

## 2025-04-04 NOTE — ANESTHESIA POSTPROCEDURE EVALUATION
Anesthesia Post Evaluation    Patient: Adilia Gregg    Procedure(s) Performed: * No procedures listed *    Final Anesthesia Type: MAC      Patient location during evaluation: OPS  Patient participation: Yes- Able to Participate  Level of consciousness: awake and alert and oriented  Post-procedure vital signs: reviewed and stable  Pain management: adequate  Airway patency: patent  ARVIN mitigation strategies: Multimodal analgesia  PONV status at discharge: No PONV  Anesthetic complications: no      Cardiovascular status: blood pressure returned to baseline and hemodynamically stable  Respiratory status: unassisted, spontaneous ventilation and room air  Hydration status: euvolemic  Follow-up not needed.              Vitals Value Taken Time   BP 89/48 04/04/25 09:55   Temp 36.2 °C (97.1 °F) 04/04/25 09:25   Pulse 64 04/04/25 09:55   Resp 20 04/04/25 09:55   SpO2 97 % 04/04/25 09:55         No case tracking events are documented in the log.      Pain/Jorge A Score: No data recorded

## 2025-04-04 NOTE — DISCHARGE SUMMARY
[facility]  [facility] Stay    Colonoscopy      OUTCOME: Patient tolerated treatment/procedure well without complication and is now ready for discharge.    DISPOSITION: Home or Self Care    FINAL DIAGNOSIS:  <principal problem not specified>    FOLLOWUP: In clinic    DISCHARGE INSTRUCTIONS:  No discharge procedures on file.     TIME SPENT ON DISCHARGE:  minutes

## 2025-04-14 ENCOUNTER — LAB VISIT (OUTPATIENT)
Dept: LAB | Facility: HOSPITAL | Age: 65
End: 2025-04-14
Attending: INTERNAL MEDICINE
Payer: MEDICARE

## 2025-04-14 DIAGNOSIS — E11.22 TYPE 2 DIABETES MELLITUS WITH END-STAGE RENAL DISEASE: ICD-10-CM

## 2025-04-14 DIAGNOSIS — N18.9 CHRONIC KIDNEY DISEASE, UNSPECIFIED: Primary | ICD-10-CM

## 2025-04-14 DIAGNOSIS — G47.00 INSOMNIA WITH SLEEP APNEA: ICD-10-CM

## 2025-04-14 DIAGNOSIS — I10 HYPERTENSION, UNSPECIFIED TYPE: ICD-10-CM

## 2025-04-14 DIAGNOSIS — J44.9 VANISHING LUNG: ICD-10-CM

## 2025-04-14 DIAGNOSIS — G47.30 INSOMNIA WITH SLEEP APNEA: ICD-10-CM

## 2025-04-14 DIAGNOSIS — N18.6 TYPE 2 DIABETES MELLITUS WITH END-STAGE RENAL DISEASE: ICD-10-CM

## 2025-04-14 DIAGNOSIS — E87.1 HYPOSMOLALITY SYNDROME: ICD-10-CM

## 2025-04-14 LAB
25(OH)D3+25(OH)D2 SERPL-MCNC: 68 NG/ML (ref 30–80)
ALBUMIN SERPL-MCNC: 4.5 G/DL (ref 3.4–5)
BASOPHILS # BLD AUTO: 0.01 X10(3)/MCL (ref 0.01–0.08)
BASOPHILS NFR BLD AUTO: 0.1 % (ref 0.1–1.2)
BILIRUB UR QL STRIP.AUTO: NEGATIVE
BUN SERPL-MCNC: 18 MG/DL (ref 7–20)
CALCIUM SERPL-MCNC: 9.2 MG/DL (ref 8.4–10.2)
CALCIUM SERPL-MCNC: 9.2 MG/DL (ref 8.4–10.2)
CHLORIDE SERPL-SCNC: 101 MMOL/L (ref 98–110)
CLARITY UR: CLEAR
CO2 SERPL-SCNC: 25 MMOL/L (ref 21–32)
COLOR UR AUTO: YELLOW
CREAT SERPL-MCNC: 1.14 MG/DL (ref 0.66–1.25)
CREAT UR-MCNC: 71.2 MG/DL (ref 45–106)
CREAT UR-MCNC: 76.4 MG/DL
CREAT/UREA NIT SERPL: 16 (ref 12–20)
EOSINOPHIL # BLD AUTO: 0.17 X10(3)/MCL (ref 0.04–0.36)
EOSINOPHIL NFR BLD AUTO: 1.6 % (ref 0.7–7)
ERYTHROCYTE [DISTWIDTH] IN BLOOD BY AUTOMATED COUNT: 13 % (ref 11–14.5)
FERRITIN SERPL-MCNC: 18 NG/ML (ref 6.24–264)
GFR SERPLBLD CREATININE-BSD FMLA CKD-EPI: 54 ML/MIN/1.73/M2
GLUCOSE SERPL-MCNC: 102 MG/DL (ref 70–115)
GLUCOSE UR QL STRIP: >=1000
HCT VFR BLD AUTO: 42.4 % (ref 36–48)
HGB BLD-MCNC: 14.1 G/DL (ref 11.8–16)
HGB UR QL STRIP: NEGATIVE
IMM GRANULOCYTES # BLD AUTO: 0.19 X10(3)/MCL (ref 0–0.03)
IMM GRANULOCYTES NFR BLD AUTO: 1.8 % (ref 0–0.5)
IRON SATN MFR SERPL: 26 % (ref 20–50)
IRON SERPL-MCNC: 92 UG/DL (ref 50–170)
KETONES UR QL STRIP: NEGATIVE
LEUKOCYTE ESTERASE UR QL STRIP: NEGATIVE
LYMPHOCYTES # BLD AUTO: 2.45 X10(3)/MCL (ref 1.16–3.74)
LYMPHOCYTES NFR BLD AUTO: 23.1 % (ref 20–55)
MCH RBC QN AUTO: 29.7 PG (ref 27–34)
MCHC RBC AUTO-ENTMCNC: 33.3 G/DL (ref 31–37)
MCV RBC AUTO: 89.5 FL (ref 79–99)
MICROALBUMIN UR-MCNC: 9.4 UG/ML
MICROALBUMIN/CREAT RATIO PNL UR: 13.2 MG/GM CR (ref 0–30)
MONOCYTES # BLD AUTO: 0.87 X10(3)/MCL (ref 0.24–0.36)
MONOCYTES NFR BLD AUTO: 8.2 % (ref 4.7–12.5)
NEUTROPHILS # BLD AUTO: 6.91 X10(3)/MCL (ref 1.56–6.13)
NEUTROPHILS NFR BLD AUTO: 65.2 % (ref 37–73)
NITRITE UR QL STRIP: NEGATIVE
PH UR STRIP: 6 [PH]
PHOSPHATE SERPL-MCNC: 4.2 MG/DL (ref 2.5–4.9)
PLATELET # BLD AUTO: 105 X10(3)/MCL (ref 140–371)
PMV BLD AUTO: 11.1 FL (ref 9.4–12.4)
POTASSIUM SERPL-SCNC: 4.2 MMOL/L (ref 3.5–5.1)
PROT UR QL STRIP: NEGATIVE
PROT UR STRIP-MCNC: 14 MG/DL
PTH-INTACT SERPL-MCNC: 88.9 PG/ML (ref 14–73)
RBC # BLD AUTO: 4.74 X10(6)/MCL (ref 4–5.1)
SODIUM SERPL-SCNC: 132 MMOL/L (ref 136–145)
SP GR UR STRIP.AUTO: 1.01 (ref 1–1.03)
TIBC SERPL-MCNC: 267 UG/DL (ref 70–310)
TIBC SERPL-MCNC: 359 UG/DL (ref 250–450)
TRANSFERRIN SERPL-MCNC: 321 MG/DL (ref 173–360)
URINE PROTEIN/CREATININE RATIO (OLG): 0.2
UROBILINOGEN UR STRIP-ACNC: 0.2
WBC # BLD AUTO: 10.6 X10(3)/MCL (ref 4–11.5)

## 2025-04-14 PROCEDURE — 83550 IRON BINDING TEST: CPT

## 2025-04-14 PROCEDURE — 85025 COMPLETE CBC W/AUTO DIFF WBC: CPT

## 2025-04-14 PROCEDURE — 82570 ASSAY OF URINE CREATININE: CPT

## 2025-04-14 PROCEDURE — 80069 RENAL FUNCTION PANEL: CPT

## 2025-04-14 PROCEDURE — 82570 ASSAY OF URINE CREATININE: CPT | Mod: 59

## 2025-04-14 PROCEDURE — 82306 VITAMIN D 25 HYDROXY: CPT

## 2025-04-14 PROCEDURE — 83970 ASSAY OF PARATHORMONE: CPT

## 2025-04-14 PROCEDURE — 82728 ASSAY OF FERRITIN: CPT

## 2025-04-14 PROCEDURE — 36415 COLL VENOUS BLD VENIPUNCTURE: CPT

## 2025-04-14 PROCEDURE — 82652 VIT D 1 25-DIHYDROXY: CPT

## 2025-04-14 PROCEDURE — 81003 URINALYSIS AUTO W/O SCOPE: CPT

## 2025-04-18 LAB — 1,25(OH)2D SERPL-MCNC: 23 PG/ML (ref 18–78)

## 2025-04-28 ENCOUNTER — HOSPITAL ENCOUNTER (OUTPATIENT)
Dept: RADIOLOGY | Facility: HOSPITAL | Age: 65
Discharge: HOME | End: 2025-04-28
Attending: FAMILY MEDICINE
Payer: MEDICARE

## 2025-04-28 DIAGNOSIS — R10.9 STOMACH ACHE: ICD-10-CM

## 2025-04-28 PROCEDURE — 74018 RADEX ABDOMEN 1 VIEW: CPT | Mod: TC

## 2025-08-27 ENCOUNTER — LAB VISIT (OUTPATIENT)
Dept: LAB | Facility: HOSPITAL | Age: 65
End: 2025-08-27
Attending: NURSE PRACTITIONER
Payer: MEDICARE

## 2025-08-27 DIAGNOSIS — Z79.899 POLYPHARMACY: Primary | ICD-10-CM

## 2025-08-27 LAB
25(OH)D3+25(OH)D2 SERPL-MCNC: 110 NG/ML (ref 30–80)
ALBUMIN SERPL-MCNC: 4.1 G/DL (ref 3.4–4.8)
ALBUMIN/GLOB SERPL: 1.5 RATIO (ref 1.1–2)
ALP SERPL-CCNC: 87 UNIT/L (ref 40–150)
ALT SERPL-CCNC: 9 UNIT/L (ref 0–55)
ANION GAP SERPL CALC-SCNC: 11 MEQ/L
AST SERPL-CCNC: 13 UNIT/L (ref 11–45)
BASOPHILS # BLD AUTO: 0.09 X10(3)/MCL (ref 0.01–0.08)
BASOPHILS NFR BLD AUTO: 0.9 % (ref 0.1–1.2)
BILIRUB SERPL-MCNC: 0.3 MG/DL
BUN SERPL-MCNC: 14 MG/DL (ref 9.8–20.1)
CALCIUM SERPL-MCNC: 9.3 MG/DL (ref 8.4–10.2)
CHLORIDE SERPL-SCNC: 102 MMOL/L (ref 98–107)
CHOLEST SERPL-MCNC: 135 MG/DL
CHOLEST/HDLC SERPL: 4 {RATIO} (ref 0–5)
CO2 SERPL-SCNC: 23 MMOL/L (ref 23–31)
CREAT SERPL-MCNC: 0.69 MG/DL (ref 0.55–1.02)
CREAT/UREA NIT SERPL: 20
EOSINOPHIL # BLD AUTO: 0.12 X10(3)/MCL (ref 0.04–0.36)
EOSINOPHIL NFR BLD AUTO: 1.1 % (ref 0.7–7)
ERYTHROCYTE [DISTWIDTH] IN BLOOD BY AUTOMATED COUNT: 13.2 % (ref 11–14.5)
GFR SERPLBLD CREATININE-BSD FMLA CKD-EPI: >90 ML/MIN/1.73/M2
GLOBULIN SER-MCNC: 2.7 GM/DL (ref 2.4–3.5)
GLUCOSE SERPL-MCNC: 100 MG/DL (ref 82–115)
HCT VFR BLD AUTO: 46.3 % (ref 36–48)
HDLC SERPL-MCNC: 36 MG/DL (ref 35–60)
HGB BLD-MCNC: 15.8 G/DL (ref 11.8–16)
IMM GRANULOCYTES # BLD AUTO: 0.07 X10(3)/MCL (ref 0–0.03)
IMM GRANULOCYTES NFR BLD AUTO: 0.7 % (ref 0–0.5)
LDLC SERPL CALC-MCNC: 73 MG/DL (ref 50–140)
LYMPHOCYTES # BLD AUTO: 2.71 X10(3)/MCL (ref 1.16–3.74)
LYMPHOCYTES NFR BLD AUTO: 25.7 % (ref 20–55)
MAGNESIUM SERPL-MCNC: 2 MG/DL (ref 1.6–2.6)
MCH RBC QN AUTO: 30.3 PG (ref 27–34)
MCHC RBC AUTO-ENTMCNC: 34.1 G/DL (ref 31–37)
MCV RBC AUTO: 88.7 FL (ref 79–99)
MONOCYTES # BLD AUTO: 0.84 X10(3)/MCL (ref 0.24–0.36)
MONOCYTES NFR BLD AUTO: 8 % (ref 4.7–12.5)
NEUTROPHILS # BLD AUTO: 6.7 X10(3)/MCL (ref 1.56–6.13)
NEUTROPHILS NFR BLD AUTO: 63.6 % (ref 37–73)
NRBC BLD AUTO-RTO: 0 %
PLATELET # BLD AUTO: 126 X10(3)/MCL (ref 140–371)
PMV BLD AUTO: 11 FL (ref 9.4–12.4)
POTASSIUM SERPL-SCNC: 4 MMOL/L (ref 3.5–5.1)
PROT SERPL-MCNC: 6.8 GM/DL (ref 5.8–7.6)
RBC # BLD AUTO: 5.22 X10(6)/MCL (ref 4–5.1)
SODIUM SERPL-SCNC: 136 MMOL/L (ref 136–145)
TRIGL SERPL-MCNC: 129 MG/DL (ref 37–140)
TSH SERPL-ACNC: 0.87 UIU/ML (ref 0.35–4.94)
VLDLC SERPL CALC-MCNC: 26 MG/DL
WBC # BLD AUTO: 10.53 X10(3)/MCL (ref 4–11.5)

## 2025-08-27 PROCEDURE — 83735 ASSAY OF MAGNESIUM: CPT

## 2025-08-27 PROCEDURE — 80053 COMPREHEN METABOLIC PANEL: CPT

## 2025-08-27 PROCEDURE — 36415 COLL VENOUS BLD VENIPUNCTURE: CPT

## 2025-08-27 PROCEDURE — 85025 COMPLETE CBC W/AUTO DIFF WBC: CPT

## 2025-08-27 PROCEDURE — 80061 LIPID PANEL: CPT

## 2025-08-27 PROCEDURE — 82306 VITAMIN D 25 HYDROXY: CPT

## 2025-08-27 PROCEDURE — 84443 ASSAY THYROID STIM HORMONE: CPT

## 2025-08-29 ENCOUNTER — CLINICAL SUPPORT (OUTPATIENT)
Dept: RESPIRATORY THERAPY | Facility: HOSPITAL | Age: 65
End: 2025-08-29
Attending: NURSE PRACTITIONER
Payer: MEDICARE

## 2025-08-29 DIAGNOSIS — Z79.899 POLYPHARMACY: ICD-10-CM

## 2025-08-29 PROCEDURE — 93005 ELECTROCARDIOGRAM TRACING: CPT

## 2025-08-30 LAB
OHS QRS DURATION: 80 MS
OHS QTC CALCULATION: 405 MS

## (undated) DEVICE — Device

## (undated) DEVICE — DRAPE C-ARM COVER EZ 36X28IN

## (undated) DEVICE — COVER MAYO STAND REINFRCD 30

## (undated) DEVICE — SUT 3-0 MONOCRYL PLUS PS-2

## (undated) DEVICE — CUFF ATS 2 PORT SNGL BLDR 18IN

## (undated) DEVICE — KIT SURGICAL TURNOVER

## (undated) DEVICE — ADHESIVE DERMABOND ADVANCED

## (undated) DEVICE — SUT 3-0 VICRYL / SH (J416)

## (undated) DEVICE — DRESSING XEROFORM FOIL PK 1X8

## (undated) DEVICE — BANDAGE VELCLOSE ELAS 4INX5YD

## (undated) DEVICE — GAUZE SPONGE 4X4 12PLY

## (undated) DEVICE — PADDING 4X4YD SPECIALIST100

## (undated) DEVICE — SOL NACL IRR 1000ML BTL

## (undated) DEVICE — GOWN POLY REINF X-LONG 2XL

## (undated) DEVICE — APPLICATOR CHLORAPREP ORN 26ML

## (undated) DEVICE — COVER EQUIPMENT 36X25

## (undated) DEVICE — ELECTRODE PATIENT RETURN DISP

## (undated) DEVICE — SUT ETHILON 3/0 18IN PS-1

## (undated) DEVICE — SCREW T10 SHAFT DRIVER N CANN

## (undated) DEVICE — SPLINT FIBERGLASS PAD 3X15

## (undated) DEVICE — PAD ABDOMINAL STERILE 8X10IN

## (undated) DEVICE — GLOVE 7.0 PROTEXIS PI BLUE

## (undated) DEVICE — DRAPE STERI U-SHAPED 47X51IN

## (undated) DEVICE — GLOVE PROTEXIS HYDROGEL SZ8.5

## (undated) DEVICE — SUT VICRYL 2-0 36 CT-1

## (undated) DEVICE — GLOVE PROTEXIS BLUE LATEX 7.5

## (undated) DEVICE — HANDLE DEVON RIGID OR LIGHT

## (undated) DEVICE — GLOVE PROTEXIS HYDROGEL SZ6.5

## (undated) DEVICE — TIP SUCTION YANKAUER

## (undated) DEVICE — COVER LIGHT HANDLE

## (undated) DEVICE — GLOVE PROTEXIS HYDROGEL SZ7.5

## (undated) DEVICE — GLOVE PROTEXIS BLUE LATEX 8.5

## (undated) DEVICE — DRAPE INCISE IOBAN 2 13X13IN

## (undated) DEVICE — DRAPE HAND STERILE

## (undated) DEVICE — CORD BIPOLAR 12 FOOT

## (undated) DEVICE — BUCKET PLASTER DISPOSABLE